# Patient Record
Sex: FEMALE | Race: WHITE | NOT HISPANIC OR LATINO | Employment: FULL TIME | URBAN - METROPOLITAN AREA
[De-identification: names, ages, dates, MRNs, and addresses within clinical notes are randomized per-mention and may not be internally consistent; named-entity substitution may affect disease eponyms.]

---

## 2017-01-12 ENCOUNTER — TELEPHONE (OUTPATIENT)
Dept: FAMILY MEDICINE | Facility: CLINIC | Age: 52
End: 2017-01-12

## 2017-01-12 DIAGNOSIS — I10 HYPERTENSION GOAL BP (BLOOD PRESSURE) < 140/90: Primary | ICD-10-CM

## 2017-01-12 NOTE — TELEPHONE ENCOUNTER
Medication Question Only     Patient calling due to questions regarding  lisinopril (PRINIVIL/ZESTRIL). Patient would prefer to go over details with team at this time. Return number is [phone]    Alis Giang  Reception/ Scheduling

## 2017-01-12 NOTE — TELEPHONE ENCOUNTER
Deborah E Weil is a 51 year old female    S-(situation): Evie is calling today with concerns of medication side effects    B-(background): started on lisinopril. Had a dry cough for quite some time. Was even treated for a sinus infection, thought she had developed a cold. Symptoms have now all gone away, just has a dry cough present.     A-(assessment): dry cough    Pain scale (1-10): 0/10   Denies: Chest pain, sob, sore throat, ear problems, nasal or chest congestion   Meds/MeasuresTried: lisinopril   Improves/Worsens: n/a       R-(recommendations): Home care advice - will discuss with provider to see if medication change is warranted. [facility] in [place] if new medication needed. Patient will be at number listed below til 430 pm.  Will comply with recommendation: YES     If further questions/concerns or if Sxs do not improve, worsen or new Sxs develop, call your PCP or [facility] as soon as possible.    Guideline used:  Telephone Triage Protocols for Nurses, Fourth Edition, Christa Kennedy  Clinical Reference: Lisinopril  Nursing Judgment     Shaniqua Head, RN, BSN

## 2017-01-12 NOTE — TELEPHONE ENCOUNTER
LM- unsure if due to URI or not. Cough is not harmful, could wait 2 weeks to decide. Or, if pt. Is certain it is due to med, switch to cozaar 25 mg PO QD. Shaniqua Orlando

## 2017-01-17 RX ORDER — LOSARTAN POTASSIUM 25 MG/1
12.5 TABLET ORAL DAILY
Qty: 15 TABLET | Refills: 1 | Status: SHIPPED | OUTPATIENT
Start: 2017-01-17 | End: 2017-04-24

## 2017-01-17 NOTE — TELEPHONE ENCOUNTER
Spoke with patient.  she feels its from lisinopril and wants to change medications.  rx sent to pharmacy.  Will come in two weeks after for BP check with float.    Narendra Mantilla RN

## 2017-04-20 NOTE — PROGRESS NOTES
SUBJECTIVE:                                                    Deborah E Weil is a 52 year old female who presents to clinic today for the following health issues:    Hypertension Follow-up      Outpatient blood pressures are not being checked.    Low Salt Diet: low salt       Amount of exercise or physical activity: None    Problems taking medications regularly: No    Medication side effects: cough    Diet: regular (no restrictions)    Acute Illness   Acute illness concerns: Cough  Onset: x 5 months    Fever: no    Chills/Sweats: no    Headache (location?): no    Sinus Pressure:no    Conjunctivitis:  no    Ear Pain: no    Rhinorrhea: no    Congestion: no    Sore Throat: no     Cough: YES    Wheeze: no    Decreased Appetite: no    Nausea: no    Vomiting: no    Diarrhea:  no    Dysuria/Freq.: no    Fatigue/Achiness: no    Sick/Strep Exposure: no     Therapies Tried and outcome: OTC cough meds, allergy pills, vicks, sinus sprays      Patient reports she has been dealing with a persistent severe cough since November/December 2016. She was switched back to lisinopril around November. She has found no relief in symptoms with over the counter medications for cough, allergies and sinus.     She relates that she has not been exercising as much as she should be.     Problem list and histories reviewed & adjusted, as indicated.  Additional history: as documented    Patient Active Problem List   Diagnosis     CARDIOVASCULAR SCREENING; LDL GOAL LESS THAN 160     Stephie-menopausal     Irregular menstrual cycle     Hypertension goal BP (blood pressure) < 140/90     Past Surgical History:   Procedure Laterality Date     CHOLECYSTECTOMY, LAPOROSCOPIC  2004    Cholecystectomy, Laparoscopic     COLONOSCOPY WITH CO2 INSUFFLATION N/A 1/17/2017    Procedure: COLONOSCOPY WITH CO2 INSUFFLATION;  Surgeon: Duane, William Charles, MD;  Location:  OR       Social History   Substance Use Topics     Smoking status: Never Smoker     Smokeless  "tobacco: Never Used     Alcohol use Yes      Comment: Occ at social events, 4 drinks a month     Family History   Problem Relation Age of Onset     DIABETES Father      C.A.D. Father      MI at 40     Hypertension Father      DIABETES Sister      DIABETES Sister      Colon Cancer No family hx of          Current Outpatient Prescriptions   Medication Sig Dispense Refill     losartan (COZAAR) 25 MG tablet Take 1 tablet (25 mg) by mouth daily 30 tablet 0     [DISCONTINUED] losartan (COZAAR) 25 MG tablet Take 0.5 tablets (12.5 mg) by mouth daily (Patient not taking: Reported on 4/24/2017) 15 tablet 1       Reviewed and updated as needed this visit by clinical staff  Tobacco  Allergies  Med Hx  Surg Hx  Fam Hx  Soc Hx      Reviewed and updated as needed this visit by Provider         ROS:  Constitutional, HEENT, cardiovascular, pulmonary, gi and gu systems are negative, except as otherwise noted.    This document serves as a record of the services and decisions personally performed and made by Shaniqua Orlando DNP. It was created on her behalf by Liane Dawson, a trained medical scribe. The creation of this document is based on the provider's statements to the medical scribe.  Liane Dawson 8:35 AM April 24, 2017    OBJECTIVE:                                                    /88 (BP Location: Left arm, Patient Position: Chair, Cuff Size: Adult Regular)  Pulse 85  Temp 98.7  F (37.1  C) (Temporal)  Ht 1.686 m (5' 6.38\")  Wt 88 kg (194 lb)  LMP 09/26/2014  SpO2 96%  BMI 30.96 kg/m2  Body mass index is 30.96 kg/(m^2).  GENERAL APPEARANCE: healthy, alert and no distress  HENT: ear canals and TM's normal and nose and mouth without ulcers or lesions  NECK: no adenopathy, no asymmetry, masses, or scars and thyroid normal to palpation  RESP: lungs clear to auscultation - no rales, rhonchi or wheezes  CV: regular rates and rhythm, normal S1 S2, no S3 or S4 and no murmur, click or rub  NEURO: Normal " strength and tone, mentation intact and speech normal  PSYCH: mentation appears normal and affect normal/bright    Diagnostic test results:  No results found for this or any previous visit (from the past 24 hour(s)).     ASSESSMENT/PLAN:                                                        ICD-10-CM    1. Hypertension goal BP (blood pressure) < 140/90 I10 losartan (COZAAR) 25 MG tablet     ACE cough with HTN- controlled. Pt. Went on lisinopril due to missed visit. Switching back to Cozaar.     Suspect cough is due to lisinopril. Will switch back to cozaar. BMP and BP check in 2 weeks. Updating allergy list. Order placed for cozaar. Medication direction, dosage, and side effects discussed with patient.    Checking labs in 2 weeks on Cozaar and PE December.     Colonoscopy up to date.    Follow up with Provider - December for physical and labs      The information in this document, created by the medical scribe for me, accurately reflects the services I personally performed and the decisions made by me. I have reviewed and approved this document for accuracy prior to leaving the patient care area.  April 24, 2017 8:40 AM    CAMILO Glez [facility] JOSÉ

## 2017-04-24 ENCOUNTER — OFFICE VISIT (OUTPATIENT)
Dept: FAMILY MEDICINE | Facility: CLINIC | Age: 52
End: 2017-04-24
Payer: COMMERCIAL

## 2017-04-24 VITALS
TEMPERATURE: 98.7 F | HEART RATE: 85 BPM | DIASTOLIC BLOOD PRESSURE: 88 MMHG | WEIGHT: 194 LBS | BODY MASS INDEX: 31.18 KG/M2 | OXYGEN SATURATION: 96 % | HEIGHT: 66 IN | SYSTOLIC BLOOD PRESSURE: 124 MMHG

## 2017-04-24 DIAGNOSIS — I10 HYPERTENSION GOAL BP (BLOOD PRESSURE) < 140/90: Primary | ICD-10-CM

## 2017-04-24 PROCEDURE — 99213 OFFICE O/P EST LOW 20 MIN: CPT | Performed by: NURSE PRACTITIONER

## 2017-04-24 RX ORDER — LOSARTAN POTASSIUM 25 MG/1
25 TABLET ORAL DAILY
Qty: 30 TABLET | Refills: 0 | Status: SHIPPED | OUTPATIENT
Start: 2017-04-24 | End: 2017-07-12

## 2017-04-24 ASSESSMENT — PAIN SCALES - GENERAL: PAINLEVEL: NO PAIN (0)

## 2017-04-24 NOTE — MR AVS SNAPSHOT
After Visit Summary   4/24/2017    Deborah E Weil    MRN: [id number]           Patient Information     Date Of Birth          1965        Visit Information        Provider Department      4/24/2017 8:20 AM Shaniqua Orlando APRN CNP [facility]        Today's Diagnoses     Hypertension goal BP (blood pressure) < 140/90    -  1       Follow-ups after your visit        Your next 10 appointments already scheduled     May 08, 2017  8:30 AM CDT   LAB with RG LAB   [facility] ([facility])    [address]  [place] [id number] [phone]           Patient must bring picture ID.  Patient should be prepared to give a urine specimen  Please do not eat 10-12 hours before your appointment if you are coming in fasting for labs on lipids, cholesterol, or glucose (sugar).  Pregnant women should follow their Care Team instructions. Water with medications is okay. Do not drink coffee or other fluids.   If you have concerns about taking  your medications, please ask at office or if scheduling via [employer], send a message by clicking on Secure Messaging, Message Your Care Team.            May 08, 2017  9:00 AM CDT   Nurse Only with RG FLOAT 1   [facility] ([facility])    [address]  [place] [id number] [phone]              Future tests that were ordered for you today     Open Future Orders        Priority Expected Expires Ordered    **Basic metabolic panel FUTURE 2mo Routine 6/23/2017 8/22/2017 4/24/2017            Who to contact     If you have questions or need follow up information about today's clinic visit or your schedule please contact [facility] directly at [phone].  Normal or non-critical lab and imaging results will be communicated to you by MyChart, letter or phone within 4 business days after the clinic has received the results. If you do not hear from us within 7  "days, please contact the clinic through [employer] or phone. If you have a critical or abnormal lab result, we will notify you by phone as soon as possible.  Submit refill requests through [employer] or call your pharmacy and they will forward the refill request to us. Please allow 3 business days for your refill to be completed.          Additional Information About Your Visit        [employer] Information     [employer] gives you secure access to your electronic health record. If you see a primary care provider, you can also send messages to your care team and make appointments. If you have questions, please call your primary care clinic.  If you do not have a primary care provider, please call [phone] and they will assist you.        Care EveryWhere ID     This is your Care EveryWhere ID. This could be used by other organizations to access your [place] medical records  [id number]        Your Vitals Were     Pulse Temperature Height Last Period Pulse Oximetry BMI (Body Mass Index)    85 98.7  F (37.1  C) (Temporal) 5' 6.38\" (1.686 m) 09/26/2014 96% 30.96 kg/m2       Blood Pressure from Last 3 Encounters:   04/24/17 124/88   01/17/17 (!) 117/92   12/22/16 128/86    Weight from Last 3 Encounters:   04/24/17 194 lb (88 kg)   12/22/16 190 lb 6.4 oz (86.4 kg)   12/08/16 191 lb 9.6 oz (86.9 kg)                 Today's Medication Changes          These changes are accurate as of: 4/24/17 12:13 PM.  If you have any questions, ask your nurse or doctor.               These medicines have changed or have updated prescriptions.        Dose/Directions    losartan 25 MG tablet   Commonly known as:  COZAAR   This may have changed:  how much to take   Used for:  Hypertension goal BP (blood pressure) < 140/90   Changed by:  Shaniqua Orlando APRN CNP        Dose:  25 mg   Take 1 tablet (25 mg) by mouth daily   Quantity:  30 tablet   Refills:  0         Stop taking these medicines if you haven't already. Please contact your care team if " you have questions.     LISINOPRIL PO   Stopped by:  Shaniqua Orlando APRN CNP                Where to get your medicines      These medications were sent to [facility] [address]  [address] [address] [address]     Phone:  [phone]     losartan 25 MG tablet                Primary Care Provider Office Phone # Fax #    CAMILO English [phone]       [facility] [address] [address] [facility] [address] [facility] [address]        Thank you!     Thank you for choosing [facility]  for your care. Our goal is always to provide you with excellent care. Hearing back from our patients is one way we can continue to improve our services. Please take a few minutes to complete the written survey that you may receive in the mail after your visit with us. Thank you!             Your Updated Medication List - Protect others around you: Learn how to safely use, store and throw away your medicines at www.disposemymeds.org.          This list is accurate as of: 4/24/17 12:13 PM.  Always use your most recent med list.                   Brand Name Dispense Instructions for use    losartan 25 MG tablet    COZAAR    30 tablet    Take 1 tablet (25 mg) by mouth daily

## 2017-04-24 NOTE — NURSING NOTE
"Chief Complaint   Patient presents with     Cough     Hypertension     Panel Management     UTD       Initial /88 (BP Location: Left arm, Patient Position: Chair, Cuff Size: Adult Regular)  Pulse 85  Temp 98.7  F (37.1  C) (Temporal)  Ht 5' 6.38\" (1.686 m)  Wt 194 lb (88 kg)  LMP 09/26/2014  SpO2 96%  BMI 30.96 kg/m2 Estimated body mass index is 30.96 kg/(m^2) as calculated from the following:    Height as of this encounter: 5' 6.38\" (1.686 m).    Weight as of this encounter: 194 lb (88 kg).  Medication Reconciliation: complete  Rachel Dumont CMA (AAMA)      "

## 2017-05-02 ENCOUNTER — MYC MEDICAL ADVICE (OUTPATIENT)
Dept: FAMILY MEDICINE | Facility: CLINIC | Age: 52
End: 2017-05-02

## 2017-05-08 ENCOUNTER — ALLIED HEALTH/NURSE VISIT (OUTPATIENT)
Dept: FAMILY MEDICINE | Facility: CLINIC | Age: 52
End: 2017-05-08
Payer: COMMERCIAL

## 2017-05-08 VITALS — DIASTOLIC BLOOD PRESSURE: 100 MMHG | HEART RATE: 68 BPM | SYSTOLIC BLOOD PRESSURE: 140 MMHG

## 2017-05-08 DIAGNOSIS — I10 ESSENTIAL HYPERTENSION: Primary | ICD-10-CM

## 2017-05-08 DIAGNOSIS — I10 HYPERTENSION GOAL BP (BLOOD PRESSURE) < 140/90: ICD-10-CM

## 2017-05-08 LAB
ANION GAP SERPL CALCULATED.3IONS-SCNC: 8 MMOL/L (ref 3–14)
BUN SERPL-MCNC: 14 MG/DL (ref 7–30)
CALCIUM SERPL-MCNC: 9.4 MG/DL (ref 8.5–10.1)
CHLORIDE SERPL-SCNC: 106 MMOL/L (ref 94–109)
CO2 SERPL-SCNC: 28 MMOL/L (ref 20–32)
CREAT SERPL-MCNC: 0.9 MG/DL (ref 0.52–1.04)
GFR SERPL CREATININE-BSD FRML MDRD: 66 ML/MIN/1.7M2
GLUCOSE SERPL-MCNC: 106 MG/DL (ref 70–99)
POTASSIUM SERPL-SCNC: 4.6 MMOL/L (ref 3.4–5.3)
SODIUM SERPL-SCNC: 142 MMOL/L (ref 133–144)

## 2017-05-08 PROCEDURE — 99207 ZZC NO CHARGE NURSE ONLY: CPT

## 2017-05-08 PROCEDURE — 80048 BASIC METABOLIC PNL TOTAL CA: CPT | Performed by: NURSE PRACTITIONER

## 2017-05-08 PROCEDURE — 36415 COLL VENOUS BLD VENIPUNCTURE: CPT | Performed by: NURSE PRACTITIONER

## 2017-05-08 RX ORDER — LOSARTAN POTASSIUM AND HYDROCHLOROTHIAZIDE 12.5; 5 MG/1; MG/1
1 TABLET ORAL DAILY
Qty: 30 TABLET | Refills: 1 | Status: SHIPPED | OUTPATIENT
Start: 2017-05-08 | End: 2017-05-23

## 2017-05-08 NOTE — MR AVS SNAPSHOT
After Visit Summary   5/8/2017    Deborah E Weil    MRN: [id number]           Patient Information     Date Of Birth          1965        Visit Information        Provider Department      5/8/2017 9:00 AM RG FLOAT 1 [facility]        Today's Diagnoses     Essential hypertension    -  1       Follow-ups after your visit        Follow-up notes from your care team     Return for BP Recheck.      Future tests that were ordered for you today     Open Future Orders        Priority Expected Expires Ordered    **Basic metabolic panel FUTURE 2mo Routine 7/7/2017 9/5/2017 5/8/2017            Who to contact     If you have questions or need follow up information about today's clinic visit or your schedule please contact [facility] directly at [phone].  Normal or non-critical lab and imaging results will be communicated to you by in[employer]hart, letter or phone within 4 business days after the clinic has received the results. If you do not hear from us within 7 days, please contact the clinic through [employer] or phone. If you have a critical or abnormal lab result, we will notify you by phone as soon as possible.  Submit refill requests through [employer] or call your pharmacy and they will forward the refill request to us. Please allow 3 business days for your refill to be completed.          Additional Information About Your Visit        MyChart Information     [employer] gives you secure access to your electronic health record. If you see a primary care provider, you can also send messages to your care team and make appointments. If you have questions, please call your primary care clinic.  If you do not have a primary care provider, please call [phone] and they will assist you.        Care EveryWhere ID     This is your Care EveryWhere ID. This could be used by other organizations to access your [place] medical records  [id number]        Your Vitals Were     Pulse Last Period                 68 09/26/2014           Blood Pressure from Last 3 Encounters:   05/08/17 (!) 140/100   04/24/17 124/88   01/17/17 (!) 117/92    Weight from Last 3 Encounters:   04/24/17 194 lb (88 kg)   12/22/16 190 lb 6.4 oz (86.4 kg)   12/08/16 191 lb 9.6 oz (86.9 kg)                 Today's Medication Changes          These changes are accurate as of: 5/8/17  9:36 AM.  If you have any questions, ask your nurse or doctor.               Start taking these medicines.        Dose/Directions    losartan-hydrochlorothiazide 50-12.5 MG per tablet   Commonly known as:  HYZAAR   Used for:  Essential hypertension        Dose:  1 tablet   Take 1 tablet by mouth daily   Quantity:  30 tablet   Refills:  1            Where to get your medicines      These medications were sent to [facility] [address] [place] [address] [facility]  [address] [address] [facility] [facility] [address] [address]     Phone:  [phone]     losartan-hydrochlorothiazide 50-12.5 MG per tablet                Primary Care Provider Office Phone # Fax #    CAMILO English [facility] [phone]       [facility] [address] [facility] [address]        Thank you!     Thank you for choosing [facility]  for your care. Our goal is always to provide you with excellent care. Hearing back from our patients is one way we can continue to improve our services. Please take a few minutes to complete the written survey that you may receive in the mail after your visit with us. Thank you!             Your Updated Medication List - Protect others around you: Learn how to safely use, store and throw away your medicines at www.disposemymeds.org.          This list is accurate as of: 5/8/17  9:36 AM.  Always use your most recent med list.                   Brand Name Dispense Instructions for use    losartan 25 MG tablet    COZAAR    30 tablet    Take 1 tablet (25 mg) by mouth daily       losartan-hydrochlorothiazide 50-12.5 MG per tablet    HYZAAR    30  tablet    Take 1 tablet by mouth daily

## 2017-05-08 NOTE — PROGRESS NOTES
Deborah E Weil is a 52 year old female who comes in today for a Blood Pressure check because of new medication.    *Document pulse and BP  *Use new set of vitals button for multiple readings.  *Use extended vitals for orthostatic    Vitals as recorded, a  Regular large cuff was used.    Patient is taking medication as prescribed  Patient is tolerating medications well.  Patient is not monitoring Blood Pressure at home.      Current complaints: none    Disposition: results routed to MD/JORGE Mo CMA

## 2017-05-23 ENCOUNTER — ALLIED HEALTH/NURSE VISIT (OUTPATIENT)
Dept: FAMILY MEDICINE | Facility: CLINIC | Age: 52
End: 2017-05-23
Payer: COMMERCIAL

## 2017-05-23 VITALS — HEART RATE: 80 BPM | DIASTOLIC BLOOD PRESSURE: 88 MMHG | SYSTOLIC BLOOD PRESSURE: 110 MMHG

## 2017-05-23 DIAGNOSIS — I10 ESSENTIAL HYPERTENSION: Primary | ICD-10-CM

## 2017-05-23 PROCEDURE — 99207 ZZC NO CHARGE NURSE ONLY: CPT

## 2017-05-23 RX ORDER — LOSARTAN POTASSIUM AND HYDROCHLOROTHIAZIDE 12.5; 5 MG/1; MG/1
1 TABLET ORAL DAILY
Qty: 90 TABLET | Refills: 3 | Status: SHIPPED | OUTPATIENT
Start: 2017-05-23 | End: 2017-07-12

## 2017-05-23 NOTE — MR AVS SNAPSHOT
After Visit Summary   5/23/2017    Deborah E Weil    MRN: [id number]           Patient Information     Date Of Birth          1965        Visit Information        Provider Department      5/23/2017 8:30 AM RG FLOAT 1 [facility]        Today's Diagnoses     HTN (hypertension)    -  1       Follow-ups after your visit        Follow-up notes from your care team     Return for BP Recheck.      Who to contact     If you have questions or need follow up information about today's clinic visit or your schedule please contact [facility] directly at [phone].  Normal or non-critical lab and imaging results will be communicated to you by [employer], letter or phone within 4 business days after the clinic has received the results. If you do not hear from us within 7 days, please contact the clinic through [employer] or phone. If you have a critical or abnormal lab result, we will notify you by phone as soon as possible.  Submit refill requests through [employer] or call your pharmacy and they will forward the refill request to us. Please allow 3 business days for your refill to be completed.          Additional Information About Your Visit        MyChart Information     [employer] gives you secure access to your electronic health record. If you see a primary care provider, you can also send messages to your care team and make appointments. If you have questions, please call your primary care clinic.  If you do not have a primary care provider, please call [phone] and they will assist you.        Care EveryWhere ID     This is your Care EveryWhere ID. This could be used by other organizations to access your [place] medical records  [id number]        Your Vitals Were     Pulse Last Period                80 09/26/2014           Blood Pressure from Last 3 Encounters:   05/23/17 110/88   05/08/17 (!) 140/100   04/24/17 124/88    Weight from Last 3 Encounters:   04/24/17 194 lb (88 kg)    12/22/16 190 lb 6.4 oz (86.4 kg)   12/08/16 191 lb 9.6 oz (86.9 kg)              Today, you had the following     No orders found for display       Primary Care Provider Office Phone # Fax #    Shaniqua COPELAND CAMILO Orlando [facility] [phone]       [facility] [address] [facility] [address]        Thank you!     Thank you for choosing [facility]  for your care. Our goal is always to provide you with excellent care. Hearing back from our patients is one way we can continue to improve our services. Please take a few minutes to complete the written survey that you may receive in the mail after your visit with us. Thank you!             Your Updated Medication List - Protect others around you: Learn how to safely use, store and throw away your medicines at www.disposemymeds.org.          This list is accurate as of: 5/23/17  8:31 AM.  Always use your most recent med list.                   Brand Name Dispense Instructions for use    losartan 25 MG tablet    COZAAR    30 tablet    Take 1 tablet (25 mg) by mouth daily       losartan-hydrochlorothiazide 50-12.5 MG per tablet    HYZAAR    30 tablet    Take 1 tablet by mouth daily

## 2017-05-23 NOTE — PROGRESS NOTES
Deborah E Weil is a 52 year old female who comes in today for a Blood Pressure check because of medication change.    *Document pulse and BP  *Use new set of vitals button for multiple readings.  *Use extended vitals for orthostatic    Vitals as recorded, a large cuff was used.    Patient is taking medication as prescribed  Patient is tolerating medications well.  Patient is monitoring Blood Pressure at home.  Average readings if yes are 120/94    Current complaints: none    Disposition: results routed to MD/JORGE Mo CMA

## 2017-06-05 DIAGNOSIS — I10 ESSENTIAL HYPERTENSION: ICD-10-CM

## 2017-06-05 LAB
ANION GAP SERPL CALCULATED.3IONS-SCNC: 10 MMOL/L (ref 3–14)
BUN SERPL-MCNC: 18 MG/DL (ref 7–30)
CALCIUM SERPL-MCNC: 9.4 MG/DL (ref 8.5–10.1)
CHLORIDE SERPL-SCNC: 103 MMOL/L (ref 94–109)
CO2 SERPL-SCNC: 27 MMOL/L (ref 20–32)
CREAT SERPL-MCNC: 0.92 MG/DL (ref 0.52–1.04)
GFR SERPL CREATININE-BSD FRML MDRD: 65 ML/MIN/1.7M2
GLUCOSE SERPL-MCNC: 103 MG/DL (ref 70–99)
POTASSIUM SERPL-SCNC: 4.1 MMOL/L (ref 3.4–5.3)
SODIUM SERPL-SCNC: 140 MMOL/L (ref 133–144)

## 2017-06-05 PROCEDURE — 36415 COLL VENOUS BLD VENIPUNCTURE: CPT | Performed by: NURSE PRACTITIONER

## 2017-06-05 PROCEDURE — 80048 BASIC METABOLIC PNL TOTAL CA: CPT | Performed by: NURSE PRACTITIONER

## 2017-06-06 ENCOUNTER — MYC MEDICAL ADVICE (OUTPATIENT)
Dept: FAMILY MEDICINE | Facility: CLINIC | Age: 52
End: 2017-06-06

## 2017-07-11 DIAGNOSIS — I10 ESSENTIAL HYPERTENSION: ICD-10-CM

## 2017-07-12 ENCOUNTER — MYC MEDICAL ADVICE (OUTPATIENT)
Dept: FAMILY MEDICINE | Facility: CLINIC | Age: 52
End: 2017-07-12

## 2017-07-12 DIAGNOSIS — I10 ESSENTIAL HYPERTENSION: ICD-10-CM

## 2017-07-12 RX ORDER — LOSARTAN POTASSIUM AND HYDROCHLOROTHIAZIDE 12.5; 5 MG/1; MG/1
1 TABLET ORAL DAILY
Qty: 90 TABLET | Refills: 1 | Status: SHIPPED | OUTPATIENT
Start: 2017-07-12 | End: 2018-01-23

## 2017-07-12 NOTE — TELEPHONE ENCOUNTER
Patient can be informed that a prescription for Losartan/HCTZ was sent as requested to [facility]. Ninety days with a refill similar to the prescription sent in June by Shaniqua Orlando. Not sure if she is still taking Losartan 25 mg in addition. Needing to be clarified.    Cecil Canchola MD

## 2017-07-14 RX ORDER — LOSARTAN POTASSIUM AND HYDROCHLOROTHIAZIDE 12.5; 5 MG/1; MG/1
TABLET ORAL
Qty: 30 TABLET | Refills: 0 | OUTPATIENT
Start: 2017-07-14

## 2017-07-14 NOTE — TELEPHONE ENCOUNTER
losartan-hydrochlorothiazide (HYZAAR) 50-12.5 MG per tablet  Rx was sent 07/12/2017 for 90 tabs and 1 refills. Patient should have medication through 01/2018.  Pharmacy notified via E-prescribe refusal.  Cara Alonzo, RN, BSN

## 2018-01-18 NOTE — PROGRESS NOTES
"   SUBJECTIVE:   CC: Deborah E Weil is an 52 year old woman who presents for preventive health visit.     Physical   Annual:     Getting at least 3 servings of Calcium per day::  Yes    Bi-annual eye exam::  NO    Dental care twice a year::  NO    Sleep apnea or symptoms of sleep apnea::  None    Diet::  Regular (no restrictions)    Frequency of exercise::  2-3 days/week    Duration of exercise::  15-30 minutes    Taking medications regularly::  Yes    Medication side effects::  Muscle aches    Additional concerns today::  No          Hypertension Follow-up      Outpatient blood pressures are not being checked. Only once in while     Low Salt Diet: no added salt    Patient is on Losartan-HCTZ 50-12.5mg and states her blood pressure has been well-controlled at home. She states she develops left foot aches and wonders if it relates to medication side effects. She denies side effects with the medication such as headaches, nausea, swelling, rashes.    She notes that she has gained some weight over the holidays and is aware of this. She admits that her and spouse have \"gotten lazy\". She understands the importance of weight management. She has a treadmill at home and runs for about 30 minutes.     Her LMP was about 4 years ago. She does note of some intermittent menopausal symptoms but they are manageable. She denies vaginal symptoms or spotting.    She states that she does not sleep well at night. She relates this to menopause or likely anxiety. She states that she will suddenly wake up at night a little startled. Patient's  has mentioned to patient that patient has begun snoring at night as well.       Today's PHQ-2 Score:   PHQ-2 ( 1999 Pfizer) 1/23/2018   Q1: Little interest or pleasure in doing things 0   Q2: Feeling down, depressed or hopeless 0   PHQ-2 Score 0   Q1: Little interest or pleasure in doing things Not at all   Q2: Feeling down, depressed or hopeless Not at all   PHQ-2 Score 0       Abuse: Current " or Past(Physical, Sexual or Emotional)- No  Do you feel safe in your environment - Yes    Social History   Substance Use Topics     Smoking status: Never Smoker     Smokeless tobacco: Never Used     Alcohol use Yes      Comment: Occ at social events, 4 drinks a month     Alcohol Use 1/23/2018   If you drink alcohol, do you typically have greater than 3 drinks per day OR greater than 7 drinks per week?   No       Reviewed orders with patient.  Reviewed health maintenance and updated orders accordingly - Yes  Labs reviewed in EPIC  BP Readings from Last 3 Encounters:   01/23/18 116/80   05/23/17 110/88   05/08/17 (!) 140/100    Wt Readings from Last 3 Encounters:   01/23/18 199 lb (90.3 kg)   04/24/17 194 lb (88 kg)   12/22/16 190 lb 6.4 oz (86.4 kg)                  Patient Active Problem List   Diagnosis     CARDIOVASCULAR SCREENING; LDL GOAL LESS THAN 160     Hypertension goal BP (blood pressure) < 140/90     Menopause     Past Surgical History:   Procedure Laterality Date     CHOLECYSTECTOMY, LAPOROSCOPIC  2004    Cholecystectomy, Laparoscopic     COLONOSCOPY WITH CO2 INSUFFLATION N/A 1/17/2017    Procedure: COLONOSCOPY WITH CO2 INSUFFLATION;  Surgeon: Duane, William Charles, MD;  Location:  OR       Social History   Substance Use Topics     Smoking status: Never Smoker     Smokeless tobacco: Never Used     Alcohol use Yes      Comment: Occ at social events, 4 drinks a month     Family History   Problem Relation Age of Onset     DIABETES Father      C.A.D. Father      MI at 40     Hypertension Father      DIABETES Sister      DIABETES Sister      Colon Cancer No family hx of          Current Outpatient Prescriptions   Medication Sig Dispense Refill     losartan-hydrochlorothiazide (HYZAAR) 50-12.5 MG per tablet Take 1 tablet by mouth daily 90 tablet 3     [DISCONTINUED] losartan-hydrochlorothiazide (HYZAAR) 50-12.5 MG per tablet Take 1 tablet by mouth daily 90 tablet 1     Allergies   Allergen Reactions      Lisinopril Cough     Recent Labs   Lab Test  01/23/18   0829  06/05/17   0943   12/08/16   1135  03/18/15   0945  10/21/14   0852   LDL  96   --    --   134*   --   106   HDL  62   --    --   64   --   66   TRIG  76   --    --   62   --   87   ALT   --    --    --   28  24   --    CR  0.91  0.92   < >  0.80  0.80   --    GFRESTIMATED  65  65   < >  75  76   --    GFRESTBLACK  78  78   < >  >90   GFR Calc    >90   GFR Calc     --    POTASSIUM  3.4  4.1   < >  4.0  4.5   --    TSH  1.58   --    --    --    --   2.46    < > = values in this interval not displayed.              Patient over age 50, mutual decision to screen reflected in health maintenance.      Pertinent mammograms are reviewed under the imaging tab.  History of abnormal Pap smear:   NO - age 30-65 PAP every 5 years with negative HPV co-testing recommended  Last 3 Pap Results:   PAP (no units)   Date Value   10/21/2014 NIL       Reviewed and updated as needed this visit by clinical staff  Tobacco  Allergies  Med Hx  Surg Hx  Fam Hx  Soc Hx        Reviewed and updated as needed this visit by Provider          Review of Systems  C: NEGATIVE for fever, chills, change in weight  I: NEGATIVE for worrisome rashes, moles or lesions  E: NEGATIVE for vision changes or irritation  ENT: NEGATIVE for ear, mouth and throat problems  R: NEGATIVE for significant cough or SOB  B: NEGATIVE for masses, tenderness or discharge  CV: NEGATIVE for chest pain, palpitations or peripheral edema  GI: NEGATIVE for nausea, abdominal pain, heartburn, or change in bowel habits  : NEGATIVE for unusual urinary or vaginal symptoms. No vaginal bleeding.  M: NEGATIVE for significant arthralgias or myalgia. POSITIVE for left foot aches  N: NEGATIVE for weakness, dizziness or paresthesias  P: NEGATIVE for changes in mood or affect     This document serves as a record of the services and decisions personally performed and made by Shaniqua Orlando CNP. It  "was created on her behalf by Slime Gonzales, a trained medical scribe. The creation of this document is based the provider's statements to the medical scribe.    Slime Gonzales January 23, 2018 8:13 AM    OBJECTIVE:   /80  Pulse 76  Temp 99  F (37.2  C) (Temporal)  Resp 14  Ht 5' 6\" (1.676 m)  Wt 199 lb (90.3 kg)  LMP 09/26/2014  BMI 32.12 kg/m2  Physical Exam  GENERAL APPEARANCE: healthy, alert and no distress  EYES: Eyes grossly normal to inspection, PERRL and conjunctivae and sclerae normal  HENT: ear canals and TM's normal, nose and mouth without ulcers or lesions, oropharynx clear and oral mucous membranes moist  NECK: no adenopathy, no asymmetry, masses, or scars and thyroid normal to palpation  RESP: lungs clear to auscultation - no rales, rhonchi or wheezes  BREAST: normal without masses, tenderness or nipple discharge and no palpable axillary masses or adenopathy  CV: regular rate and rhythm, normal S1 S2, no S3 or S4, no murmur, click or rub, no peripheral edema and peripheral pulses strong  ABDOMEN: soft, nontender, no hepatosplenomegaly, no masses and bowel sounds normal  MS: no musculoskeletal defects are noted and gait is age appropriate without ataxia  SKIN: no suspicious lesions or rashes  NEURO: Normal strength and tone, sensory exam grossly normal, mentation intact and speech normal  PSYCH: mentation appears normal and affect normal/bright    ASSESSMENT/PLAN:       ICD-10-CM    1. Encounter for routine adult medical exam with abnormal findings Z00.01 ADMIN 1st VACCINE     TSH with free T4 reflex     Lipid panel reflex to direct LDL Fasting     Hemoglobin     CANCELED: TDAP VACCINE (ADACEL)   2. Routine general medical examination at a health care facility Z00.00    3. Essential hypertension I10 losartan-hydrochlorothiazide (HYZAAR) 50-12.5 MG per tablet     Basic metabolic panel   4. Encounter for screening mammogram for breast cancer Z12.31 *MA Screening Digital Bilateral   5. Need for " "vaccination Z23 TD (ADULT, 7+) PRESERVE FREE     Hypertension: Chronic, stable, well controlled, continue current medication, refill done if needed.    Continue to monitor snoring at home. Discussed warning signs regarding FELICITAS symptoms, however, I suspect snoring is secondary to elevated weight. Discussed weight management. Encouraged developing healthy habits and good nutrition. If no improvement to her snorning, patient will let me know and we will discuss potential sleepy study referral at that time.    For patient's foot pain, I recommended supportive footwear. If foot pain persists, will plan further for workup with Podiatry. Patient will continue to monitor this at home and call me for worsening symptoms.    Medications reviewed with Patient today. Medication list updated and refills done today if needed.     Orders placed for fasting labs for patient to complete today. Will notify patient with results.    Due for mammogram, referral provided, see orders for details.  Colonoscopy UTD, completed in 2017. Next due in 2027.   Pap UTD, completed in 2014 and next due 1yr.     Tdap completed today.    Follow-up: in 1yr for PE and pap or sooner as needed.      COUNSELING:  Special attention given to:        Regular exercise       Healthy diet/nutrition       Immunizations    Vaccinated for: TDAP           Mammogram screening    Calcium and bone density.        reports that she has never smoked. She has never used smokeless tobacco.    Estimated body mass index is 32.12 kg/(m^2) as calculated from the following:    Height as of this encounter: 5' 6\" (1.676 m).    Weight as of this encounter: 199 lb (90.3 kg).   Weight management plan: Discussed healthy diet and exercise guidelines and patient will follow up in 12 months in clinic to re-evaluate.    Counseling Resources:  ATP IV Guidelines  Pooled Cohorts Equation Calculator  Breast Cancer Risk Calculator  FRAX Risk Assessment  ICSI Preventive Guidelines  Dietary " Guidelines for Americans, 2010  USDA's MyPlate  ASA Prophylaxis  Lung CA Screening    The information in this document, created by the medical scribe for me, accurately reflects the services I personally performed and the decisions made by me. I have reviewed and approved this document for accuracy prior to leaving the patient care area.    CAMILO Glez [facility] JOSÉ

## 2018-01-18 NOTE — PROGRESS NOTES
"  SUBJECTIVE:                                                    Deborah E Weil is a 52 year old female who presents to clinic today for the following health issues:  {Provider please address medication reconciliation discrepancies--rooming staff please delete if no med/rec issues}    HPI    {additional problems for roomer to add, delete if none:677345}    Problem list and histories reviewed & adjusted, as indicated.  Additional history: {NONE - AS DOCUMENTED:855673::\"as documented\"}    {ACUTE Problem SUPERLIST - brief histories:640840}    {HIST REVIEW/ LINKS 2:812755}    {PROVIDER CHARTING PREFERENCE:395706}  "

## 2018-01-23 ENCOUNTER — OFFICE VISIT (OUTPATIENT)
Dept: FAMILY MEDICINE | Facility: CLINIC | Age: 53
End: 2018-01-23
Payer: COMMERCIAL

## 2018-01-23 VITALS
RESPIRATION RATE: 14 BRPM | HEIGHT: 66 IN | DIASTOLIC BLOOD PRESSURE: 80 MMHG | BODY MASS INDEX: 31.98 KG/M2 | HEART RATE: 76 BPM | WEIGHT: 199 LBS | TEMPERATURE: 99 F | SYSTOLIC BLOOD PRESSURE: 116 MMHG

## 2018-01-23 DIAGNOSIS — Z12.31 ENCOUNTER FOR SCREENING MAMMOGRAM FOR BREAST CANCER: ICD-10-CM

## 2018-01-23 DIAGNOSIS — Z00.01 ENCOUNTER FOR ROUTINE ADULT MEDICAL EXAM WITH ABNORMAL FINDINGS: Primary | ICD-10-CM

## 2018-01-23 DIAGNOSIS — I10 ESSENTIAL HYPERTENSION: ICD-10-CM

## 2018-01-23 DIAGNOSIS — Z23 NEED FOR VACCINATION: ICD-10-CM

## 2018-01-23 PROBLEM — Z78.0 MENOPAUSE: Status: ACTIVE | Noted: 2018-01-23

## 2018-01-23 LAB
ANION GAP SERPL CALCULATED.3IONS-SCNC: 9 MMOL/L (ref 3–14)
BUN SERPL-MCNC: 18 MG/DL (ref 7–30)
CALCIUM SERPL-MCNC: 9.2 MG/DL (ref 8.5–10.1)
CHLORIDE SERPL-SCNC: 103 MMOL/L (ref 94–109)
CHOLEST SERPL-MCNC: 173 MG/DL
CO2 SERPL-SCNC: 27 MMOL/L (ref 20–32)
CREAT SERPL-MCNC: 0.91 MG/DL (ref 0.52–1.04)
GFR SERPL CREATININE-BSD FRML MDRD: 65 ML/MIN/1.7M2
GLUCOSE SERPL-MCNC: 106 MG/DL (ref 70–99)
HDLC SERPL-MCNC: 62 MG/DL
HGB BLD-MCNC: 13.2 G/DL (ref 11.7–15.7)
LDLC SERPL CALC-MCNC: 96 MG/DL
NONHDLC SERPL-MCNC: 111 MG/DL
POTASSIUM SERPL-SCNC: 3.4 MMOL/L (ref 3.4–5.3)
SODIUM SERPL-SCNC: 139 MMOL/L (ref 133–144)
TRIGL SERPL-MCNC: 76 MG/DL
TSH SERPL DL<=0.005 MIU/L-ACNC: 1.58 MU/L (ref 0.4–4)

## 2018-01-23 PROCEDURE — 80061 LIPID PANEL: CPT | Performed by: NURSE PRACTITIONER

## 2018-01-23 PROCEDURE — 90471 IMMUNIZATION ADMIN: CPT | Performed by: NURSE PRACTITIONER

## 2018-01-23 PROCEDURE — 80048 BASIC METABOLIC PNL TOTAL CA: CPT | Performed by: NURSE PRACTITIONER

## 2018-01-23 PROCEDURE — 84443 ASSAY THYROID STIM HORMONE: CPT | Performed by: NURSE PRACTITIONER

## 2018-01-23 PROCEDURE — 85018 HEMOGLOBIN: CPT | Performed by: NURSE PRACTITIONER

## 2018-01-23 PROCEDURE — 36415 COLL VENOUS BLD VENIPUNCTURE: CPT | Performed by: NURSE PRACTITIONER

## 2018-01-23 PROCEDURE — 99396 PREV VISIT EST AGE 40-64: CPT | Mod: 25 | Performed by: NURSE PRACTITIONER

## 2018-01-23 PROCEDURE — 90714 TD VACC NO PRESV 7 YRS+ IM: CPT | Performed by: NURSE PRACTITIONER

## 2018-01-23 RX ORDER — LOSARTAN POTASSIUM AND HYDROCHLOROTHIAZIDE 12.5; 5 MG/1; MG/1
1 TABLET ORAL DAILY
Qty: 90 TABLET | Refills: 3 | Status: SHIPPED | OUTPATIENT
Start: 2018-01-23 | End: 2019-01-25 | Stop reason: DRUGHIGH

## 2018-01-23 ASSESSMENT — PAIN SCALES - GENERAL: PAINLEVEL: NO PAIN (0)

## 2018-01-23 NOTE — NURSING NOTE
Prior to injection verified patient identity using patient's name and date of birth.      Screening Questionnaire for Adult Immunization    Are you sick today?   No   Do you have allergies to medications, food, a vaccine component or latex?   No   Have you ever had a serious reaction after receiving a vaccination?   No   Do you have a long-term health problem with heart disease, lung disease, asthma, kidney disease, metabolic disease (e.g. diabetes), anemia, or other blood disorder?   No   Do you have cancer, leukemia, HIV/AIDS, or any other immune system problem?   No   In the past 3 months, have you taken medications that affect  your immune system, such as prednisone, other steroids, or anticancer drugs; drugs for the treatment of rheumatoid arthritis, Crohn s disease, or psoriasis; or have you had radiation treatments?   No   Have you had a seizure, or a brain or other nervous system problem?   No   During the past year, have you received a transfusion of blood or blood     products, or been given immune (gamma) globulin or antiviral drug?   No   For women: Are you pregnant or is there a chance you could become        pregnant during the next month?   No   Have you received any vaccinations in the past 4 weeks?   No     Immunization questionnaire answers were all negative.        Per orders of Shaniqua Salmon, injection of TD given by Boni Doshi. Patient instructed to remain in clinic for 15 minutes afterwards, and to report any adverse reaction to me immediately.       Screening performed by Boni Doshi on 1/23/2018 at 9:14 AM.

## 2018-01-23 NOTE — MR AVS SNAPSHOT
After Visit Summary   1/23/2018    Deborah E Weil    MRN: [id number]           Patient Information     Date Of Birth          1965        Visit Information        Provider Department      1/23/2018 8:00 AM Shaniqua Orlando APRN [facility]        Today's Diagnoses     Encounter for routine adult medical exam with abnormal findings    -  1    Essential hypertension        Encounter for screening mammogram for breast cancer        Need for vaccination          Care Instructions      Preventive Health Recommendations  Female Ages 50 - 64    Yearly exam: See your health care provider every year in order to  o Review health changes.   o Discuss preventive care.    o Review your medicines if your doctor has prescribed any.      Get a Pap test every three years (unless you have an abnormal result and your provider advises testing more often).    If you get Pap tests with HPV test, you only need to test every 5 years, unless you have an abnormal result.     You do not need a Pap test if your uterus was removed (hysterectomy) and you have not had cancer.    You should be tested each year for STDs (sexually transmitted diseases) if you're at risk.     Have a mammogram every 1 to 2 years.    Have a colonoscopy at age 50, or have a yearly FIT test (stool test). These exams screen for colon cancer.      Have a cholesterol test every 5 years, or more often if advised.    Have a diabetes test (fasting glucose) every three years. If you are at risk for diabetes, you should have this test more often.     If you are at risk for osteoporosis (brittle bone disease), think about having a bone density scan (DEXA).    Shots: Get a flu shot each year. Get a tetanus shot every 10 years.    Nutrition:     Eat at least 5 servings of fruits and vegetables each day.    Eat whole-grain bread, whole-wheat pasta and brown rice instead of white grains and rice.    Talk to your provider about Calcium and Vitamin D.  "    Lifestyle    Exercise at least 150 minutes a week (30 minutes a day, 5 days a week). This will help you control your weight and prevent disease.    Limit alcohol to one drink per day.    No smoking.     Wear sunscreen to prevent skin cancer.     See your dentist every six months for an exam and cleaning.    See your eye doctor every 1 to 2 years.            Follow-ups after your visit        Who to contact     If you have questions or need follow up information about today's clinic visit or your schedule please contact [facility] directly at [phone].  Normal or non-critical lab and imaging results will be communicated to you by ADCentricityhart, letter or phone within 4 business days after the clinic has received the results. If you do not hear from us within 7 days, please contact the clinic through Liberatat or phone. If you have a critical or abnormal lab result, we will notify you by phone as soon as possible.  Submit refill requests through Let's Jock or call your pharmacy and they will forward the refill request to us. Please allow 3 business days for your refill to be completed.          Additional Information About Your Visit        ADCentricityharSmartCare system Information     Let's Jock gives you secure access to your electronic health record. If you see a primary care provider, you can also send messages to your care team and make appointments. If you have questions, please call your primary care clinic.  If you do not have a primary care provider, please call [phone] and they will assist you.        Care EveryWhere ID     This is your Care EveryWhere ID. This could be used by other organizations to access your [place] medical records  [id number]        Your Vitals Were     Pulse Temperature Respirations Height Last Period BMI (Body Mass Index)    76 99  F (37.2  C) (Temporal) 14 5' 6\" (1.676 m) 09/26/2014 32.12 kg/m2       Blood Pressure from Last 3 Encounters:   01/23/18 116/80   05/23/17 110/88   05/08/17 (!) " 140/100    Weight from Last 3 Encounters:   01/23/18 199 lb (90.3 kg)   04/24/17 194 lb (88 kg)   12/22/16 190 lb 6.4 oz (86.4 kg)              We Performed the Following     ADMIN 1st VACCINE     Basic metabolic panel     Hemoglobin     Lipid panel reflex to direct LDL Fasting     TD (ADULT, 7+) PRESERVE FREE     TSH with free T4 reflex          Where to get your medicines      These medications were sent to [facility] [address] [facility]  [address] [facility] [address]     Phone:  [phone]     losartan-hydrochlorothiazide 50-12.5 MG per tablet          Primary Care Provider Office Phone # Fax #    ShaniquaCAMILO Melo [facility] [phone] [address] [facility] [address]        Equal Access to Services     [facility]: Hadii aad edith hadasho Soomaali, waaxda luqadaha, qaybta kaalmada adeegyada, jennifer mahmood . So [facility] [phone].    ATENCIÓN: Si habla español, tiene a grullon disposición servicios gratuitos de asistencia lingüística. Margarito al [phone].    We comply with applicable federal civil rights laws and [place] laws. We do not discriminate on the basis of race, color, national origin, age, disability, sex, sexual orientation, or gender identity.            Thank you!     Thank you for choosing [facility]  for your care. Our goal is always to provide you with excellent care. Hearing back from our patients is one way we can continue to improve our services. Please take a few minutes to complete the written survey that you may receive in the mail after your visit with us. Thank you!             Your Updated Medication List - Protect others around you: Learn how to safely use, store and throw away your medicines at www.disposemymeds.org.          This list is accurate as of 1/23/18 11:59 PM.  Always use your most recent med list.                   Brand Name Dispense Instructions for use Diagnosis     losartan-hydrochlorothiazide 50-12.5 MG per tablet    HYZAAR    90 tablet    Take 1 tablet by mouth daily    Essential hypertension

## 2018-02-05 ENCOUNTER — RADIANT APPOINTMENT (OUTPATIENT)
Dept: MAMMOGRAPHY | Facility: CLINIC | Age: 53
End: 2018-02-05
Attending: NURSE PRACTITIONER
Payer: COMMERCIAL

## 2018-02-05 DIAGNOSIS — Z12.31 ENCOUNTER FOR SCREENING MAMMOGRAM FOR BREAST CANCER: ICD-10-CM

## 2018-02-05 PROCEDURE — 77067 SCR MAMMO BI INCL CAD: CPT | Performed by: STUDENT IN AN ORGANIZED HEALTH CARE EDUCATION/TRAINING PROGRAM

## 2018-06-29 ENCOUNTER — OFFICE VISIT (OUTPATIENT)
Dept: FAMILY MEDICINE | Facility: CLINIC | Age: 53
End: 2018-06-29
Payer: COMMERCIAL

## 2018-06-29 VITALS
HEIGHT: 66 IN | DIASTOLIC BLOOD PRESSURE: 82 MMHG | OXYGEN SATURATION: 97 % | HEART RATE: 80 BPM | SYSTOLIC BLOOD PRESSURE: 118 MMHG | BODY MASS INDEX: 33.04 KG/M2 | RESPIRATION RATE: 16 BRPM | TEMPERATURE: 98.9 F | WEIGHT: 205.6 LBS

## 2018-06-29 DIAGNOSIS — M25.512 LEFT SHOULDER PAIN, UNSPECIFIED CHRONICITY: Primary | ICD-10-CM

## 2018-06-29 PROCEDURE — 99214 OFFICE O/P EST MOD 30 MIN: CPT | Performed by: NURSE PRACTITIONER

## 2018-06-29 ASSESSMENT — PAIN SCALES - GENERAL: PAINLEVEL: WORST PAIN (10)

## 2018-06-29 NOTE — MR AVS SNAPSHOT
After Visit Summary   6/29/2018    Deborah E Weil    MRN: [id number]           Patient Information     Date Of Birth          1965        Visit Information        Provider Department      6/29/2018 4:00 PM Shaniqua rOlando APRN CNP [facility]        Today's Diagnoses     Left shoulder pain, unspecified chronicity    -  1       Follow-ups after your visit        Future tests that were ordered for you today     Open Future Orders        Priority Expected Expires Ordered    MR Shoulder Left w/o Contrast Routine  6/29/2019 6/29/2018            Who to contact     If you have questions or need follow up information about today's clinic visit or your schedule please contact [facility] directly at [phone].  Normal or non-critical lab and imaging results will be communicated to you by PingStamphart, letter or phone within 4 business days after the clinic has received the results. If you do not hear from us within 7 days, please contact the clinic through PingStamphart or phone. If you have a critical or abnormal lab result, we will notify you by phone as soon as possible.  Submit refill requests through Sellvana or call your pharmacy and they will forward the refill request to us. Please allow 3 business days for your refill to be completed.          Additional Information About Your Visit        MyChart Information     Sellvana gives you secure access to your electronic health record. If you see a primary care provider, you can also send messages to your care team and make appointments. If you have questions, please call your primary care clinic.  If you do not have a primary care provider, please call [phone] and they will assist you.        Care EveryWhere ID     This is your Care EveryWhere ID. This could be used by other organizations to access your [place] medical records  [id number]        Your Vitals Were     Pulse Temperature Respirations Height Last Period Pulse Oximetry     "80 98.9  F (37.2  C) (Temporal) 16 5' 6\" (1.676 m) 09/26/2014 97%    BMI (Body Mass Index)                   33.18 kg/m2            Blood Pressure from Last 3 Encounters:   06/29/18 118/82   01/23/18 116/80   05/23/17 110/88    Weight from Last 3 Encounters:   06/29/18 205 lb 9.6 oz (93.3 kg)   01/23/18 199 lb (90.3 kg)   04/24/17 194 lb (88 kg)               Primary Care Provider Office Phone # Fax #    Shaniqua CAMILO Tadeo [facility] [phone] [address] [facility] [address]        Equal Access to Services     [facility] : Hadii zack sharma hadasho Soomaali, waaxda luqadaha, qaybta kaalmada adeegyada, waxay dalein hayman mahmood . So [facility] [phone].    ATENCIÓN: Si habla español, tiene a grullon disposición servicios gratuitos de asistencia lingüística. [facility] [phone].    We comply with applicable federal civil rights laws and [place] laws. We do not discriminate on the basis of race, color, national origin, age, disability, sex, sexual orientation, or gender identity.            Thank you!     Thank you for choosing [facility]  for your care. Our goal is always to provide you with excellent care. Hearing back from our patients is one way we can continue to improve our services. Please take a few minutes to complete the written survey that you may receive in the mail after your visit with us. Thank you!             Your Updated Medication List - Protect others around you: Learn how to safely use, store and throw away your medicines at www.disposemymeds.org.          This list is accurate as of 6/29/18  5:09 PM.  Always use your most recent med list.                   Brand Name Dispense Instructions for use Diagnosis    ADVIL PO           losartan-hydrochlorothiazide 50-12.5 MG per tablet    HYZAAR    90 tablet    Take 1 tablet by mouth daily    Essential hypertension         "

## 2018-06-29 NOTE — PROGRESS NOTES
SUBJECTIVE:   Deborah E Weil is a 53 year old female who presents to clinic today for the following health issues:    HPI  Joint Pain    Onset: January 2018    Description:   Location: left shoulder  Character: Sharp and Dull ache    Intensity: severe    Progression of Symptoms: same    Accompanying Signs & Symptoms:  Other symptoms: none    History:   Previous similar pain: no       Precipitating factors:   Trauma or overuse: no     Alleviating factors:  Improved by: ice    Therapies Tried and outcome: ice, chiropractic, massage. Temporary relief.     Patient reprots that since January 2018 she has been experiencing left shoulder and shoulder blade pain. She notes that at first it felt like her muscles here tight and so she got a massage and that seemed to help briefly. A couple weeks after her massage it started to worsen again. She notes that this past week she started to experience the pain in her right shoulder and up into her neck. She notes that she has no trouble raising her right arm above her head but she cant lift her left arm without pain. She notes that this winter she went to  a blanket with her left hand and she experienced a sharp shooting pain.     Denies any numbness or tingling in her arms. She states that she is sleeping fine.      Problem list and histories reviewed & adjusted, as indicated.  Additional history: as documented    Patient Active Problem List   Diagnosis     CARDIOVASCULAR SCREENING; LDL GOAL LESS THAN 160     Hypertension goal BP (blood pressure) < 140/90     Menopause     Past Surgical History:   Procedure Laterality Date     CHOLECYSTECTOMY, LAPOROSCOPIC  2004    Cholecystectomy, Laparoscopic     COLONOSCOPY WITH CO2 INSUFFLATION N/A 1/17/2017    Procedure: COLONOSCOPY WITH CO2 INSUFFLATION;  Surgeon: Duane, William Charles, MD;  Location:  OR       Social History   Substance Use Topics     Smoking status: Never Smoker     Smokeless tobacco: Never Used     Alcohol use  "Yes      Comment: Occ at social events, 4 drinks a month     Family History   Problem Relation Age of Onset     Diabetes Father      C.A.D. Father      MI at 40     Hypertension Father      Diabetes Sister      Diabetes Sister      Colon Cancer No family hx of          Current Outpatient Prescriptions   Medication Sig Dispense Refill     Ibuprofen (ADVIL PO)        losartan-hydrochlorothiazide (HYZAAR) 50-12.5 MG per tablet Take 1 tablet by mouth daily 90 tablet 3     Allergies   Allergen Reactions     Lisinopril Cough     Recent Labs   Lab Test  01/23/18   0829  06/05/17   0943   12/08/16   1135  03/18/15   0945  10/21/14   0852   LDL  96   --    --   134*   --   106   HDL  62   --    --   64   --   66   TRIG  76   --    --   62   --   87   ALT   --    --    --   28  24   --    CR  0.91  0.92   < >  0.80  0.80   --    GFRESTIMATED  65  65   < >  75  76   --    GFRESTBLACK  78  78   < >  >90   GFR Calc    >90   GFR Calc     --    POTASSIUM  3.4  4.1   < >  4.0  4.5   --    TSH  1.58   --    --    --    --   2.46    < > = values in this interval not displayed.      BP Readings from Last 3 Encounters:   06/29/18 118/82   01/23/18 116/80   05/23/17 110/88    Wt Readings from Last 3 Encounters:   06/29/18 205 lb 9.6 oz (93.3 kg)   01/23/18 199 lb (90.3 kg)   04/24/17 194 lb (88 kg)        ROS:  Constitutional, HEENT, cardiovascular, pulmonary, GI, , musculoskeletal, neuro, skin, endocrine and psych systems are negative, except as otherwise noted.    This document serves as a record of the services and decisions personally performed and made by Shaniqua Orlando CNP. It was created on his/her behalf by Dorie Colon, trained medical scribe. The creation of this document is based the provider's statements to the medical scribes.    Cher Colon 4:22 PM, June 29, 2018    OBJECTIVE:     /82  Pulse 80  Temp 98.9  F (37.2  C) (Temporal)  Resp 16  Ht 5' 6\" (1.676 m)  Wt 205 lb " 9.6 oz (93.3 kg)  LMP 09/26/2014  SpO2 97%  BMI 33.18 kg/m2  Body mass index is 33.18 kg/(m^2).     GENERAL: healthy, alert and no distress  MS: weakened abduction, full ROM but seems to need accessory muscle use for over head trapezeius region.  NEURO: Normal strength and tone, mentation intact and speech normal  PSYCH: mentation appears normal, affect normal/bright    Diagnostic Test Results:  No results found for this or any previous visit (from the past 24 hour(s)).    ASSESSMENT/PLAN:       ICD-10-CM    1. Left shoulder pain, unspecified chronicity M25.512 MR Shoulder Left w/o Contrast     Reviewed symptoms and etiology patient presents today. Scheduled MRI of left shoulder without contrast; patient will schedule. Advised icing the area and taking ibuprofen and Aleve prn for her pain.  Will refer based on imaging result.     Follow up with PCP upon MRI results or prn.    The information in this document, created by the medical scribe for me, accurately reflects the services I personally performed and the decisions made by me. I have reviewed and approved this document for accuracy prior to leaving the patient care area.  Shaniqua Orlando CNP  4:23 PM, 06/29/18      CAMILO Glez [facility]

## 2018-07-06 ENCOUNTER — RADIANT APPOINTMENT (OUTPATIENT)
Dept: MRI IMAGING | Facility: CLINIC | Age: 53
End: 2018-07-06
Attending: NURSE PRACTITIONER
Payer: COMMERCIAL

## 2018-07-06 DIAGNOSIS — M25.512 LEFT SHOULDER PAIN, UNSPECIFIED CHRONICITY: ICD-10-CM

## 2018-07-06 PROCEDURE — 73221 MRI JOINT UPR EXTREM W/O DYE: CPT | Mod: LT | Performed by: RADIOLOGY

## 2019-01-16 NOTE — PROGRESS NOTES
SUBJECTIVE:   CC: Deborah E Weil is an 53 year old woman who presents for preventive health visit.     Physical   Annual:     Getting at least 3 servings of Calcium per day:  Yes    Bi-annual eye exam:  NO    Dental care twice a year:  NO    Sleep apnea or symptoms of sleep apnea:  None    Diet:  Low salt    Frequency of exercise:  None    Taking medications regularly:  Yes    Medication side effects:  Not applicable    Additional concerns today:  No    PHQ-2 Total Score: 0    Hypertension Follow-up    Outpatient blood pressures are being checked at home.  Results of diastolic are around 90's    Low Salt Diet: no added salt    She notes that her fingers were more swollen and she felt bloated last week.      She continues to take her OTC zantac. She notes that she is planning on increasing her exercise and she relates that she can improve her nutrition.    Denies chest pain, unusual headaches, issues with urination, skin changes she I concerned about, vaginal bleeding, or shortness of breath.       Today's PHQ-2 Score:   PHQ-2 ( 1999 Pfizer) 1/25/2019   Q1: Little interest or pleasure in doing things 0   Q2: Feeling down, depressed or hopeless 0   PHQ-2 Score 0   Q1: Little interest or pleasure in doing things Not at all   Q2: Feeling down, depressed or hopeless Not at all   PHQ-2 Score 0     Abuse: Current or Past(Physical, Sexual or Emotional)- No  Do you feel safe in your environment? Yes    Social History     Tobacco Use     Smoking status: Never Smoker     Smokeless tobacco: Never Used   Substance Use Topics     Alcohol use: Yes     Comment: Occ at social events, 4 drinks a month     Alcohol Use 1/25/2019   If you drink alcohol do you typically have greater than 3 drinks per day OR greater than 7 drinks per week? No   No flowsheet data found.    Reviewed orders with patient.  Reviewed health maintenance and updated orders accordingly - Yes  BP Readings from Last 3 Encounters:   01/25/19 130/90   06/29/18  118/82   01/23/18 116/80    Wt Readings from Last 3 Encounters:   01/25/19 93 kg (205 lb)   06/29/18 93.3 kg (205 lb 9.6 oz)   01/23/18 90.3 kg (199 lb)        Patient Active Problem List   Diagnosis     CARDIOVASCULAR SCREENING; LDL GOAL LESS THAN 160     Hypertension goal BP (blood pressure) < 140/90     Menopause     Past Surgical History:   Procedure Laterality Date     CHOLECYSTECTOMY, LAPOROSCOPIC  2004    Cholecystectomy, Laparoscopic     COLONOSCOPY WITH CO2 INSUFFLATION N/A 1/17/2017    Procedure: COLONOSCOPY WITH CO2 INSUFFLATION;  Surgeon: Duane, William Charles, MD;  Location:  OR       Social History     Tobacco Use     Smoking status: Never Smoker     Smokeless tobacco: Never Used   Substance Use Topics     Alcohol use: Yes     Comment: Occ at social events, 4 drinks a month     Family History   Problem Relation Age of Onset     Diabetes Father      C.A.D. Father         MI at 40     Hypertension Father      Diabetes Sister      Diabetes Sister      Colon Cancer No family hx of          Current Outpatient Medications   Medication Sig Dispense Refill     losartan-hydrochlorothiazide (HYZAAR) 100-12.5 MG tablet Take 1 tablet by mouth daily 30 tablet 1     losartan-hydrochlorothiazide (HYZAAR) 50-12.5 MG per tablet Take 1 tablet by mouth daily 90 tablet 3     ranitidine (ZANTAC) 300 MG tablet Take by mouth as needed       Ibuprofen (ADVIL PO)        Allergies   Allergen Reactions     Lisinopril Cough     Recent Labs   Lab Test 01/23/18  0829 06/05/17  0943  12/08/16  1135 03/18/15  0945 10/21/14  0852   LDL 96  --   --  134*  --  106   HDL 62  --   --  64  --  66   TRIG 76  --   --  62  --  87   ALT  --   --   --  28 24  --    CR 0.91 0.92   < > 0.80 0.80  --    GFRESTIMATED 65 65   < > 75 76  --    GFRESTBLACK 78 78   < > >90   GFR Calc   >90   GFR Calc    --    POTASSIUM 3.4 4.1   < > 4.0 4.5  --    TSH 1.58  --   --   --   --  2.46    < > = values in this interval not  displayed.      Mammogram Screening: Patient over age 50, mutual decision to screen reflected in health maintenance. Pertinent mammograms are reviewed under the imaging tab.    History of abnormal Pap smear:   NO - age 30-65 PAP every 5 years with negative HPV co-testing recommended  Last 3 Pap and HPV Results:   PAP / HPV 10/21/2014   PAP NIL     PAP / HPV 10/21/2014   PAP NIL     Reviewed and updated as needed this visit by clinical staff  Tobacco  Allergies  Meds  Med Hx  Surg Hx  Fam Hx  Soc Hx      Reviewed and updated as needed this visit by Provider        Past Medical History:   Diagnosis Date     Hypertension      No active medical problems       Past Surgical History:   Procedure Laterality Date     CHOLECYSTECTOMY, LAPOROSCOPIC  2004    Cholecystectomy, Laparoscopic     COLONOSCOPY WITH CO2 INSUFFLATION N/A 2017    Procedure: COLONOSCOPY WITH CO2 INSUFFLATION;  Surgeon: Duane, William Charles, MD;  Location:  OR     Obstetric History       T2      L0     SAB0   TAB0   Ectopic0   Multiple0   Live Births0       # Outcome Date GA Lbr Triston/2nd Weight Sex Delivery Anes PTL Lv   2 Term            1 Term                 Review of Systems   Constitutional: Negative for chills and fever.   HENT: Negative for congestion, ear pain, hearing loss and sore throat.    Eyes: Negative for pain and visual disturbance.   Respiratory: Negative for cough and shortness of breath.    Cardiovascular: Negative for chest pain, palpitations and peripheral edema.   Gastrointestinal: Positive for heartburn. Negative for abdominal pain, constipation, diarrhea, hematochezia and nausea.   Breasts:  Negative for tenderness, breast mass and discharge.   Genitourinary: Negative for dysuria, frequency, genital sores, hematuria, pelvic pain, urgency, vaginal bleeding and vaginal discharge.   Musculoskeletal: Negative for arthralgias, joint swelling and myalgias.   Skin: Negative for rash.   Neurological: Negative  "for dizziness, weakness, headaches and paresthesias.   Psychiatric/Behavioral: Negative for mood changes. The patient is not nervous/anxious.      This document serves as a record of the services and decisions personally performed and made by Shaniqua Orlando CNP. It was created on his/her behalf by Dorie Colon, trained medical scribe. The creation of this document is based the provider's statements to the medical scribes.    Scribidalia Colon 1:03 PM, January 25, 2019  OBJECTIVE:   /90   Pulse 66   Temp 98  F (36.7  C) (Temporal)   Resp 14   Ht 1.676 m (5' 6\")   Wt 93 kg (205 lb)   LMP 09/26/2014   BMI 33.09 kg/m       Wt Readings from Last 5 Encounters:   01/25/19 93 kg (205 lb)   06/29/18 93.3 kg (205 lb 9.6 oz)   01/23/18 90.3 kg (199 lb)   04/24/17 88 kg (194 lb)   12/22/16 86.4 kg (190 lb 6.4 oz)      Physical Exam  GENERAL: healthy, alert and no distress  EYES: Eyes grossly normal to inspection, PERRL and conjunctivae and sclerae normal  HENT: ear canals and TM's normal, nose and mouth without ulcers or lesions  NECK: no adenopathy, no asymmetry, masses, or scars and thyroid normal to palpation  RESP: lungs clear to auscultation - no rales, rhonchi or wheezes  BREAST: normal without masses, tenderness or nipple discharge and no palpable axillary masses or adenopathy  CV: regular rate and rhythm, normal S1 S2, no S3 or S4, no murmur, click or rub, no peripheral edema and peripheral pulses strong  ABDOMEN: soft, nontender, no hepatosplenomegaly, no masses and bowel sounds normal  MS: no gross musculoskeletal defects noted, no edema  SKIN: no suspicious lesions or rashes  NEURO: Normal strength and tone, mentation intact and speech normal  PSYCH: mentation appears normal, affect normal/bright     Diagnostic Test Results:  No results found for this or any previous visit (from the past 24 hour(s)).    ASSESSMENT/PLAN:       ICD-10-CM    1. Encounter for routine adult health examination without " "abnormal findings Z00.00 Hemoglobin     Lipid panel reflex to direct LDL Fasting   2. Screening for HIV (human immunodeficiency virus) Z11.4 HIV Screening   3. Need for prophylactic vaccination and inoculation against influenza Z23    4. Essential hypertension I10 losartan-hydrochlorothiazide (HYZAAR) 100-12.5 MG tablet     Comprehensive metabolic panel   5. Visit for screening mammogram Z12.31 *MA Screening Digital Bilateral     Discussed hypertension, hyperlipidemia, menopause, healthy diet, and regular exercise at length with patient today. Physical exam with Pap test and HPV screening completed today. Updated screening labs with one-time HIV screening lab placed today. Will notify with results.     Blood pressure is not fully controlled in clinic or at home for patient. Medications are well tolerated with no reported side effects. Plan to increase Hyzaar  from 50-12.5 mg to 100-12.5 mg; Rx provided. Reviewed directions, benefits, and side effects of medication with patient today.     Digital bilateral screening mammogram referral placed for patient to schedule.     Discussed Shingrex with patient. She will follow up with her pharmacy benefit to receive.    Follow up with PCP in 3 weeks for hypertension medication management visit or prn.     COUNSELING:  Reviewed preventive health counseling, as reflected in patient instructions       Regular exercise       Healthy diet/nutrition       Immunizations    Vaccinated for: Influenza         Contraception       Safe sex practices/STD prevention       HIV screeninx in teen years, 1x in adult years, and at intervals if high risk       menopause management    BP Readings from Last 1 Encounters:   19 130/90     Estimated body mass index is 33.09 kg/m  as calculated from the following:    Height as of this encounter: 1.676 m (5' 6\").    Weight as of this encounter: 93 kg (205 lb).    Weight management plan: Discussed healthy diet and exercise guidelines     " reports that  has never smoked. she has never used smokeless tobacco.    Counseling Resources:  ATP IV Guidelines  Pooled Cohorts Equation Calculator  Breast Cancer Risk Calculator  FRAX Risk Assessment  ICSI Preventive Guidelines  Dietary Guidelines for Americans, 2010  USDA's MyPlate  ASA Prophylaxis  Lung CA Screening    The information in this document, created by the medical scribe for me, accurately reflects the services I personally performed and the decisions made by me. I have reviewed and approved this document for accuracy prior to leaving the patient care area.  Shaniqua Orlando CNP  1:03 PM, 01/25/19    CAMILO Glez CNP  [facility]

## 2019-01-25 ENCOUNTER — OFFICE VISIT (OUTPATIENT)
Dept: FAMILY MEDICINE | Facility: CLINIC | Age: 54
End: 2019-01-25
Payer: COMMERCIAL

## 2019-01-25 VITALS
HEART RATE: 66 BPM | WEIGHT: 205 LBS | SYSTOLIC BLOOD PRESSURE: 126 MMHG | TEMPERATURE: 98 F | DIASTOLIC BLOOD PRESSURE: 88 MMHG | BODY MASS INDEX: 32.95 KG/M2 | HEIGHT: 66 IN | RESPIRATION RATE: 14 BRPM

## 2019-01-25 DIAGNOSIS — I10 ESSENTIAL HYPERTENSION: ICD-10-CM

## 2019-01-25 DIAGNOSIS — R74.8 ELEVATED LIVER ENZYMES: ICD-10-CM

## 2019-01-25 DIAGNOSIS — Z12.31 VISIT FOR SCREENING MAMMOGRAM: ICD-10-CM

## 2019-01-25 DIAGNOSIS — Z23 NEED FOR PROPHYLACTIC VACCINATION AND INOCULATION AGAINST INFLUENZA: ICD-10-CM

## 2019-01-25 DIAGNOSIS — Z00.00 ENCOUNTER FOR ROUTINE ADULT HEALTH EXAMINATION WITHOUT ABNORMAL FINDINGS: Primary | ICD-10-CM

## 2019-01-25 DIAGNOSIS — E87.6 HYPOKALEMIA: ICD-10-CM

## 2019-01-25 DIAGNOSIS — Z11.4 SCREENING FOR HIV (HUMAN IMMUNODEFICIENCY VIRUS): ICD-10-CM

## 2019-01-25 LAB
ALBUMIN SERPL-MCNC: 4.1 G/DL (ref 3.4–5)
ALP SERPL-CCNC: 78 U/L (ref 40–150)
ALT SERPL W P-5'-P-CCNC: 60 U/L (ref 0–50)
ANION GAP SERPL CALCULATED.3IONS-SCNC: 8 MMOL/L (ref 3–14)
AST SERPL W P-5'-P-CCNC: 39 U/L (ref 0–45)
BILIRUB SERPL-MCNC: 0.4 MG/DL (ref 0.2–1.3)
BUN SERPL-MCNC: 16 MG/DL (ref 7–30)
CALCIUM SERPL-MCNC: 9.4 MG/DL (ref 8.5–10.1)
CHLORIDE SERPL-SCNC: 103 MMOL/L (ref 94–109)
CHOLEST SERPL-MCNC: 219 MG/DL
CO2 SERPL-SCNC: 27 MMOL/L (ref 20–32)
CREAT SERPL-MCNC: 0.86 MG/DL (ref 0.52–1.04)
GFR SERPL CREATININE-BSD FRML MDRD: 77 ML/MIN/{1.73_M2}
GLUCOSE SERPL-MCNC: 92 MG/DL (ref 70–99)
HDLC SERPL-MCNC: 65 MG/DL
HGB BLD-MCNC: 13.4 G/DL (ref 11.7–15.7)
LDLC SERPL CALC-MCNC: 141 MG/DL
NONHDLC SERPL-MCNC: 154 MG/DL
POTASSIUM SERPL-SCNC: 3.3 MMOL/L (ref 3.4–5.3)
PROT SERPL-MCNC: 7.9 G/DL (ref 6.8–8.8)
SODIUM SERPL-SCNC: 138 MMOL/L (ref 133–144)
TRIGL SERPL-MCNC: 63 MG/DL

## 2019-01-25 PROCEDURE — 80061 LIPID PANEL: CPT | Performed by: NURSE PRACTITIONER

## 2019-01-25 PROCEDURE — 87389 HIV-1 AG W/HIV-1&-2 AB AG IA: CPT | Performed by: NURSE PRACTITIONER

## 2019-01-25 PROCEDURE — 87624 HPV HI-RISK TYP POOLED RSLT: CPT | Performed by: NURSE PRACTITIONER

## 2019-01-25 PROCEDURE — 99396 PREV VISIT EST AGE 40-64: CPT | Mod: 25 | Performed by: NURSE PRACTITIONER

## 2019-01-25 PROCEDURE — 80053 COMPREHEN METABOLIC PANEL: CPT | Performed by: NURSE PRACTITIONER

## 2019-01-25 PROCEDURE — 99214 OFFICE O/P EST MOD 30 MIN: CPT | Mod: 25 | Performed by: NURSE PRACTITIONER

## 2019-01-25 PROCEDURE — 36415 COLL VENOUS BLD VENIPUNCTURE: CPT | Performed by: NURSE PRACTITIONER

## 2019-01-25 PROCEDURE — 85018 HEMOGLOBIN: CPT | Performed by: NURSE PRACTITIONER

## 2019-01-25 PROCEDURE — G0145 SCR C/V CYTO,THINLAYER,RESCR: HCPCS | Performed by: NURSE PRACTITIONER

## 2019-01-25 PROCEDURE — 90471 IMMUNIZATION ADMIN: CPT | Performed by: NURSE PRACTITIONER

## 2019-01-25 PROCEDURE — 90686 IIV4 VACC NO PRSV 0.5 ML IM: CPT | Performed by: NURSE PRACTITIONER

## 2019-01-25 RX ORDER — LOSARTAN POTASSIUM AND HYDROCHLOROTHIAZIDE 12.5; 1 MG/1; MG/1
1 TABLET ORAL DAILY
Qty: 30 TABLET | Refills: 1 | Status: SHIPPED | OUTPATIENT
Start: 2019-01-25 | End: 2019-03-27

## 2019-01-25 RX ORDER — LOSARTAN POTASSIUM AND HYDROCHLOROTHIAZIDE 12.5; 5 MG/1; MG/1
1 TABLET ORAL DAILY
Qty: 90 TABLET | Refills: 3 | Status: CANCELLED | OUTPATIENT
Start: 2019-01-25

## 2019-01-25 ASSESSMENT — ENCOUNTER SYMPTOMS
DIZZINESS: 0
COUGH: 0
NERVOUS/ANXIOUS: 0
DYSURIA: 0
CONSTIPATION: 0
CHILLS: 0
FEVER: 0
BREAST MASS: 0
ARTHRALGIAS: 0
HEMATOCHEZIA: 0
WEAKNESS: 0
HEMATURIA: 0
NAUSEA: 0
SHORTNESS OF BREATH: 0
PALPITATIONS: 0
DIARRHEA: 0
JOINT SWELLING: 0
HEARTBURN: 1
PARESTHESIAS: 0
HEADACHES: 0
FREQUENCY: 0
EYE PAIN: 0
MYALGIAS: 0
ABDOMINAL PAIN: 0
SORE THROAT: 0

## 2019-01-25 ASSESSMENT — PAIN SCALES - GENERAL: PAINLEVEL: NO PAIN (0)

## 2019-01-25 ASSESSMENT — MIFFLIN-ST. JEOR: SCORE: 1556.62

## 2019-01-25 NOTE — PROGRESS NOTES
Injectable Influenza Immunization Documentation    1.  Is the person to be vaccinated sick today?   No    2. Does the person to be vaccinated have an allergy to a component   of the vaccine?   No  Egg Allergy Algorithm Link    3. Has the person to be vaccinated ever had a serious reaction   to influenza vaccine in the past?   No    4. Has the person to be vaccinated ever had Guillain-Barré syndrome?   No    Form completed by Andree Rivera CMA ([facility])

## 2019-01-28 ENCOUNTER — MYC MEDICAL ADVICE (OUTPATIENT)
Dept: FAMILY MEDICINE | Facility: CLINIC | Age: 54
End: 2019-01-28

## 2019-01-28 DIAGNOSIS — R25.2 LEG CRAMPS: Primary | ICD-10-CM

## 2019-01-28 LAB — HIV 1+2 AB+HIV1 P24 AG SERPL QL IA: NONREACTIVE

## 2019-01-29 LAB
COPATH REPORT: NORMAL
PAP: NORMAL

## 2019-01-30 DIAGNOSIS — R25.2 LEG CRAMPS: ICD-10-CM

## 2019-01-30 LAB
ANION GAP SERPL CALCULATED.3IONS-SCNC: 6 MMOL/L (ref 3–14)
BUN SERPL-MCNC: 15 MG/DL (ref 7–30)
CALCIUM SERPL-MCNC: 9.7 MG/DL (ref 8.5–10.1)
CHLORIDE SERPL-SCNC: 104 MMOL/L (ref 94–109)
CO2 SERPL-SCNC: 29 MMOL/L (ref 20–32)
CREAT SERPL-MCNC: 0.86 MG/DL (ref 0.52–1.04)
GFR SERPL CREATININE-BSD FRML MDRD: 77 ML/MIN/{1.73_M2}
GLUCOSE SERPL-MCNC: 97 MG/DL (ref 70–99)
POTASSIUM SERPL-SCNC: 4.1 MMOL/L (ref 3.4–5.3)
SODIUM SERPL-SCNC: 139 MMOL/L (ref 133–144)

## 2019-01-30 PROCEDURE — 80048 BASIC METABOLIC PNL TOTAL CA: CPT | Performed by: NURSE PRACTITIONER

## 2019-01-30 PROCEDURE — 36415 COLL VENOUS BLD VENIPUNCTURE: CPT | Performed by: NURSE PRACTITIONER

## 2019-01-30 NOTE — TELEPHONE ENCOUNTER
Patient read my chart message and scheduled appointment. Closing encounter.     1/29/2019  8:57 AM Y Shaniqua Orlando, CAMILO CNP Patient Medical Advice Request  RE: Question about medications

## 2019-01-31 LAB
FINAL DIAGNOSIS: NORMAL
HPV HR 12 DNA CVX QL NAA+PROBE: NEGATIVE
HPV16 DNA SPEC QL NAA+PROBE: NEGATIVE
HPV18 DNA SPEC QL NAA+PROBE: NEGATIVE
SPECIMEN DESCRIPTION: NORMAL
SPECIMEN SOURCE CVX/VAG CYTO: NORMAL

## 2019-02-04 ENCOUNTER — TELEPHONE (OUTPATIENT)
Dept: OTHER | Facility: CLINIC | Age: 54
End: 2019-02-04

## 2019-02-04 NOTE — TELEPHONE ENCOUNTER
2/4/2019    Call Regarding Onboarding: P1 - Other    Attempt 1    Message on voicemail     Comments: 1 Spouse Dep      Outreach   KATERIN     Launch [employer] and print the 'Prescriptions from this Visit' Report

## 2019-02-14 NOTE — TELEPHONE ENCOUNTER
2/13/2019    Call Regarding Onboarding: P1 - Other     Attempt 2     Message on voicemail      Comments: 1 Spouse Dep    Outreach ,  Zoila Rivers

## 2019-02-18 DIAGNOSIS — E87.6 HYPOKALEMIA: ICD-10-CM

## 2019-02-18 DIAGNOSIS — R74.8 ELEVATED LIVER ENZYMES: ICD-10-CM

## 2019-02-18 LAB
ALBUMIN SERPL-MCNC: 3.7 G/DL (ref 3.4–5)
ALP SERPL-CCNC: 78 U/L (ref 40–150)
ALT SERPL W P-5'-P-CCNC: 38 U/L (ref 0–50)
AST SERPL W P-5'-P-CCNC: 24 U/L (ref 0–45)
BILIRUB DIRECT SERPL-MCNC: 0.1 MG/DL (ref 0–0.2)
BILIRUB SERPL-MCNC: 0.4 MG/DL (ref 0.2–1.3)
POTASSIUM SERPL-SCNC: 3.8 MMOL/L (ref 3.4–5.3)
PROT SERPL-MCNC: 7 G/DL (ref 6.8–8.8)

## 2019-02-18 PROCEDURE — 84132 ASSAY OF SERUM POTASSIUM: CPT | Performed by: NURSE PRACTITIONER

## 2019-02-18 PROCEDURE — 80076 HEPATIC FUNCTION PANEL: CPT | Performed by: NURSE PRACTITIONER

## 2019-02-18 PROCEDURE — 36415 COLL VENOUS BLD VENIPUNCTURE: CPT | Performed by: NURSE PRACTITIONER

## 2019-02-19 NOTE — TELEPHONE ENCOUNTER
2/19/2019    Call Regarding Onboarding P1 Other    Attempt 3    Message on voicemail     Comments: 1 ADULT      Outreach   CC

## 2019-03-27 DIAGNOSIS — I10 ESSENTIAL HYPERTENSION: ICD-10-CM

## 2019-03-27 RX ORDER — LOSARTAN POTASSIUM AND HYDROCHLOROTHIAZIDE 12.5; 1 MG/1; MG/1
TABLET ORAL
Qty: 90 TABLET | Refills: 1 | Status: SHIPPED | OUTPATIENT
Start: 2019-03-27 | End: 2019-09-25

## 2019-03-27 NOTE — TELEPHONE ENCOUNTER
Losartan-hydrochlorothiazide    Prescription approved per [facility] Refill Protocol.    Nasima Gill, RN, BSN

## 2019-09-25 DIAGNOSIS — I10 ESSENTIAL HYPERTENSION: ICD-10-CM

## 2019-09-25 RX ORDER — LOSARTAN POTASSIUM AND HYDROCHLOROTHIAZIDE 12.5; 1 MG/1; MG/1
TABLET ORAL
Qty: 90 TABLET | Refills: 0 | Status: SHIPPED | OUTPATIENT
Start: 2019-09-25 | End: 2020-01-02

## 2019-09-25 NOTE — TELEPHONE ENCOUNTER
Hyzaar  Prescription approved per [facility] Refill Protocol.    Sweetie Porter, RN, BSN

## 2019-11-04 ENCOUNTER — OFFICE VISIT (OUTPATIENT)
Dept: FAMILY MEDICINE | Facility: CLINIC | Age: 54
End: 2019-11-04
Payer: COMMERCIAL

## 2019-11-04 VITALS
TEMPERATURE: 98.7 F | HEIGHT: 66 IN | DIASTOLIC BLOOD PRESSURE: 94 MMHG | BODY MASS INDEX: 33.78 KG/M2 | WEIGHT: 210.2 LBS | HEART RATE: 90 BPM | OXYGEN SATURATION: 95 % | RESPIRATION RATE: 14 BRPM | SYSTOLIC BLOOD PRESSURE: 120 MMHG

## 2019-11-04 DIAGNOSIS — R10.32 LLQ ABDOMINAL PAIN: ICD-10-CM

## 2019-11-04 DIAGNOSIS — R14.0 ABDOMINAL BLOATING: ICD-10-CM

## 2019-11-04 DIAGNOSIS — I10 ESSENTIAL HYPERTENSION: ICD-10-CM

## 2019-11-04 DIAGNOSIS — R10.31 RLQ ABDOMINAL PAIN: Primary | ICD-10-CM

## 2019-11-04 LAB
ALBUMIN SERPL-MCNC: 4.1 G/DL (ref 3.4–5)
ALBUMIN UR-MCNC: NEGATIVE MG/DL
ALP SERPL-CCNC: 92 U/L (ref 40–150)
ALT SERPL W P-5'-P-CCNC: 48 U/L (ref 0–50)
AMYLASE SERPL-CCNC: 69 U/L (ref 30–110)
ANION GAP SERPL CALCULATED.3IONS-SCNC: 5 MMOL/L (ref 3–14)
APPEARANCE UR: CLEAR
AST SERPL W P-5'-P-CCNC: 29 U/L (ref 0–45)
BASOPHILS # BLD AUTO: 0.1 10E9/L (ref 0–0.2)
BASOPHILS NFR BLD AUTO: 0.6 %
BILIRUB SERPL-MCNC: 0.3 MG/DL (ref 0.2–1.3)
BILIRUB UR QL STRIP: NEGATIVE
BUN SERPL-MCNC: 16 MG/DL (ref 7–30)
CALCIUM SERPL-MCNC: 9.5 MG/DL (ref 8.5–10.1)
CHLORIDE SERPL-SCNC: 106 MMOL/L (ref 94–109)
CO2 SERPL-SCNC: 28 MMOL/L (ref 20–32)
COLOR UR AUTO: YELLOW
CREAT SERPL-MCNC: 0.78 MG/DL (ref 0.52–1.04)
DIFFERENTIAL METHOD BLD: NORMAL
EOSINOPHIL # BLD AUTO: 0.2 10E9/L (ref 0–0.7)
EOSINOPHIL NFR BLD AUTO: 1.9 %
ERYTHROCYTE [DISTWIDTH] IN BLOOD BY AUTOMATED COUNT: 14.6 % (ref 10–15)
GFR SERPL CREATININE-BSD FRML MDRD: 86 ML/MIN/{1.73_M2}
GLUCOSE SERPL-MCNC: 110 MG/DL (ref 70–99)
GLUCOSE UR STRIP-MCNC: NEGATIVE MG/DL
HCT VFR BLD AUTO: 41.5 % (ref 35–47)
HGB BLD-MCNC: 13.4 G/DL (ref 11.7–15.7)
HGB UR QL STRIP: NEGATIVE
KETONES UR STRIP-MCNC: NEGATIVE MG/DL
LEUKOCYTE ESTERASE UR QL STRIP: ABNORMAL
LIPASE SERPL-CCNC: 187 U/L (ref 73–393)
LYMPHOCYTES # BLD AUTO: 2.2 10E9/L (ref 0.8–5.3)
LYMPHOCYTES NFR BLD AUTO: 20.3 %
MCH RBC QN AUTO: 26.8 PG (ref 26.5–33)
MCHC RBC AUTO-ENTMCNC: 32.3 G/DL (ref 31.5–36.5)
MCV RBC AUTO: 83 FL (ref 78–100)
MONOCYTES # BLD AUTO: 1.1 10E9/L (ref 0–1.3)
MONOCYTES NFR BLD AUTO: 9.8 %
NEUTROPHILS # BLD AUTO: 7.4 10E9/L (ref 1.6–8.3)
NEUTROPHILS NFR BLD AUTO: 67.4 %
NITRATE UR QL: NEGATIVE
PH UR STRIP: 5.5 PH (ref 5–7)
PLATELET # BLD AUTO: 356 10E9/L (ref 150–450)
POTASSIUM SERPL-SCNC: 4 MMOL/L (ref 3.4–5.3)
PROT SERPL-MCNC: 7.9 G/DL (ref 6.8–8.8)
RBC # BLD AUTO: 5 10E12/L (ref 3.8–5.2)
RBC #/AREA URNS AUTO: NORMAL /HPF
SODIUM SERPL-SCNC: 139 MMOL/L (ref 133–144)
SOURCE: ABNORMAL
SP GR UR STRIP: 1.02 (ref 1–1.03)
UROBILINOGEN UR STRIP-ACNC: 0.2 EU/DL (ref 0.2–1)
WBC # BLD AUTO: 11 10E9/L (ref 4–11)
WBC #/AREA URNS AUTO: NORMAL /HPF

## 2019-11-04 PROCEDURE — 99214 OFFICE O/P EST MOD 30 MIN: CPT | Performed by: NURSE PRACTITIONER

## 2019-11-04 PROCEDURE — 36415 COLL VENOUS BLD VENIPUNCTURE: CPT | Performed by: NURSE PRACTITIONER

## 2019-11-04 PROCEDURE — 83690 ASSAY OF LIPASE: CPT | Performed by: NURSE PRACTITIONER

## 2019-11-04 PROCEDURE — 81001 URINALYSIS AUTO W/SCOPE: CPT | Performed by: NURSE PRACTITIONER

## 2019-11-04 PROCEDURE — 82150 ASSAY OF AMYLASE: CPT | Performed by: NURSE PRACTITIONER

## 2019-11-04 PROCEDURE — 85025 COMPLETE CBC W/AUTO DIFF WBC: CPT | Performed by: NURSE PRACTITIONER

## 2019-11-04 PROCEDURE — 80053 COMPREHEN METABOLIC PANEL: CPT | Performed by: NURSE PRACTITIONER

## 2019-11-04 RX ORDER — METOPROLOL SUCCINATE 25 MG/1
25 TABLET, EXTENDED RELEASE ORAL DAILY
Qty: 30 TABLET | Refills: 30 | Status: SHIPPED | OUTPATIENT
Start: 2019-11-04 | End: 2020-11-17

## 2019-11-04 ASSESSMENT — MIFFLIN-ST. JEOR: SCORE: 1575.21

## 2019-11-04 NOTE — PROGRESS NOTES
Subjective     Deborah E Weil is a 54 year old female who presents to clinic today for the following health issues:    Abdominal and back pain.     History of Present Illness        Back Pain:  She presents for follow up of back pain. Patient's back pain is a chronic problem.  Location of back pain:  Right lower back, left lower back and right middle of back  Description of back pain: dull ache, fullness and gnawing  Back pain spreads: right buttocks and left buttocks    Since patient first noticed back pain, pain is: always present, but gets better and worse  Does back pain interfere with her job:  No      She eats 2-3 servings of fruits and vegetables daily.She consumes 4 sweetened beverage(s) daily.  She is taking medications regularly.     For the past 2 weeks, but worse today, she has been experiencing suprapubic pressure and bloating with increased urinary frequency, but without vaginal symptoms, hematuria, fevers, or urinary urgency. She reports her pain is a 5-6/10 in intensity prior to taking 2 Aleve and about a 2-3/10 afterwards; she has taken 2 Aleve today and 2 yesterday. She has been alternating between heat and ice application without relief. She is having a bowel movement daily. No issues with food, nausea or vomiting. Has no real weight changes. Feeling uncomfortable and pain complaint is generalized in the back and abdomen. Feels bloated.     No fevers.       Hypertension Follow-up    Do you check your blood pressure regularly outside of the clinic? Yes . 140s/ 70s    Are you following a low salt diet? No    Are your blood pressures ever more than 140 on the top number (systolic) OR more   than 90 on the bottom number (diastolic), for example 140/90? Yes        Patient Active Problem List   Diagnosis     CARDIOVASCULAR SCREENING; LDL GOAL LESS THAN 160     Hypertension goal BP (blood pressure) < 140/90     Menopause     Past Surgical History:   Procedure Laterality Date     CHOLECYSTECTOMY,  LAPOROSCOPIC  2004    Cholecystectomy, Laparoscopic     COLONOSCOPY WITH CO2 INSUFFLATION N/A 1/17/2017    Procedure: COLONOSCOPY WITH CO2 INSUFFLATION;  Surgeon: Duane, William Charles, MD;  Location:  OR       Social History     Tobacco Use     Smoking status: Never Smoker     Smokeless tobacco: Never Used   Substance Use Topics     Alcohol use: Yes     Comment: Occ at social events, 4 drinks a month     Family History   Problem Relation Age of Onset     Diabetes Father      C.A.D. Father         MI at 40     Hypertension Father      Diabetes Sister      Diabetes Sister      Colon Cancer No family hx of          Current Outpatient Medications   Medication Sig Dispense Refill     Ibuprofen (ADVIL PO)        losartan-hydrochlorothiazide (HYZAAR) 100-12.5 MG tablet TAKE 1 TABLET BY MOUTH ONCE DAILY 90 tablet 0     metoprolol succinate ER (TOPROL-XL) 25 MG 24 hr tablet Take 1 tablet (25 mg) by mouth daily 30 tablet 30     ranitidine (ZANTAC) 300 MG tablet Take by mouth as needed       Allergies   Allergen Reactions     Lisinopril Cough     Recent Labs   Lab Test 02/18/19  0916 01/30/19  1117 01/25/19  1325 01/23/18  0829  12/08/16  1135  10/21/14  0852   LDL  --   --  141* 96  --  134*  --  106   HDL  --   --  65 62  --  64  --  66   TRIG  --   --  63 76  --  62  --  87   ALT 38  --  60*  --   --  28   < >  --    CR  --  0.86 0.86 0.91   < > 0.80   < >  --    GFRESTIMATED  --  77 77 65   < > 75   < >  --    GFRESTBLACK  --  89 89 78   < > >90   GFR Calc     < >  --    POTASSIUM 3.8 4.1 3.3* 3.4   < > 4.0   < >  --    TSH  --   --   --  1.58  --   --   --  2.46    < > = values in this interval not displayed.      BP Readings from Last 3 Encounters:   11/04/19 (!) 120/94   01/25/19 126/88   06/29/18 118/82    Wt Readings from Last 3 Encounters:   11/04/19 95.3 kg (210 lb 3.2 oz)   01/25/19 93 kg (205 lb)   06/29/18 93.3 kg (205 lb 9.6 oz)                    Reviewed and updated as needed this visit by  "Provider         Review of Systems   ROS COMP: Constitutional, HEENT, cardiovascular, pulmonary, GI, , musculoskeletal, neuro, skin, endocrine and psych systems are negative, except as otherwise noted.    This document serves as a record of the services and decisions personally performed and made by Shaniqua Orlando CNP. It was created on his/her behalf by Dorie Colon, trained medical scribe. The creation of this document is based the provider's statements to the medical scribes.    Scribe Dorie Colon 1:51 PM, November 4, 2019        Objective    BP (!) 120/94   Pulse 90   Temp 98.7  F (37.1  C) (Oral)   Resp 14   Ht 1.676 m (5' 6\")   Wt 95.3 kg (210 lb 3.2 oz)   LMP 09/26/2014 (Exact Date)   SpO2 95%   Breastfeeding? No   BMI 33.93 kg/m    Body mass index is 33.93 kg/m .     Physical Exam   GENERAL: healthy, alert and no distress  HENT: ear canals and TM's normal, nose and mouth without ulcers or lesions  NECK: no adenopathy, no asymmetry, masses, or scars and thyroid normal to palpation  RESP: lungs clear to auscultation - no rales, rhonchi or wheezes  CV: regular rate and rhythm, normal S1 S2, no S3 or S4, no murmur, click or rub, no peripheral edema and peripheral pulses strong  ABDOMEN: soft, mild epigastric and LUQ tenderness, RLQ tenderness to palpation, no rebound or guarding, negative psoas muscle testing, no hepatosplenomegaly, no masses and bowel sounds normal, is obese appearing and normal BS- pt. States feels bloated.   MS: no gross musculoskeletal defects noted, no edema, cyst near left SI joint, soft, fluid/fluctuant and non-tender approximately 4-5 cm in diameter  SKIN: no suspicious lesions or rashes    Diagnostic Test Results:  Labs reviewed in Epic  Results for orders placed or performed in visit on 11/04/19 (from the past 24 hour(s))   CBC with platelets and differential   Result Value Ref [facility]    WBC 11.0 4.0 - 11.0 10e9/L    RBC Count 5.00 3.8 - 5.2 10e12/L    Hemoglobin 13.4 11.7 " - 15.7 g/dL    Hematocrit 41.5 35.0 - 47.0 %    MCV 83 78 - 100 fl    MCH 26.8 26.5 - 33.0 pg    MCHC 32.3 31.5 - 36.5 g/dL    RDW 14.6 10.0 - 15.0 %    Platelet Count 356 150 - 450 10e9/L    % Neutrophils 67.4 %    % Lymphocytes 20.3 %    % Monocytes 9.8 %    % Eosinophils 1.9 %    % Basophils 0.6 %    Absolute Neutrophil 7.4 1.6 - 8.3 10e9/L    Absolute Lymphocytes 2.2 0.8 - 5.3 10e9/L    Absolute Monocytes 1.1 0.0 - 1.3 10e9/L    Absolute Eosinophils 0.2 0.0 - 0.7 10e9/L    Absolute Basophils 0.1 0.0 - 0.2 10e9/L    Diff Method Automated Method    *UA reflex to Microscopic and Culture ([facility] (except [place] and [place])   Result Value Ref [facility]    Color Urine Yellow     Appearance Urine Clear     Glucose Urine Negative NEG^Negative mg/dL    Bilirubin Urine Negative NEG^Negative    Ketones Urine Negative NEG^Negative mg/dL    Specific Gravity Urine 1.020 1.003 - 1.035    Blood Urine Negative NEG^Negative    pH Urine 5.5 5.0 - 7.0 pH    Protein Albumin Urine Negative NEG^Negative mg/dL    Urobilinogen Urine 0.2 0.2 - 1.0 EU/dL    Nitrite Urine Negative NEG^Negative    Leukocyte Esterase Urine Small (A) NEG^Negative    Source Midstream Urine    Urine Microscopic   Result Value Ref [facility]    WBC Urine 0 - 5 OTO5^0 - 5 /HPF    RBC Urine O - 2 OTO2^O - 2 /HPF           Assessment & Plan       ICD-10-CM    1. RLQ abdominal pain R10.31 Comprehensive metabolic panel     CBC with platelets and differential     Amylase     Lipase     *UA reflex to Microscopic and Culture ([facility] (except [place] and [place])     Urine Microscopic     CT Abdomen Pelvis w Contrast   2. LLQ abdominal pain R10.32 Comprehensive metabolic panel     CBC with platelets and differential     Amylase     Lipase     *UA reflex to Microscopic and Culture ([facility] (except [place] and [place])     CT Abdomen Pelvis w Contrast   3. Essential hypertension I10 metoprolol succinate ER  "(TOPROL-XL) 25 MG 24 hr tablet     Comprehensive metabolic panel   4. Abdominal bloating R14.0      Reviewed symptoms and etiology patient presents with today. Unclear etiology of pain and bloating symptom.   CMP, amylase, and lipase labs placed today; will notify with results. UA with reflex to microscopic and culture lab placed with CBC with platelets and differential lab placed today; result showed normal result other than small leukocyte esterase in the urine sample. Advised that she have a CT with contrast of her abdomen and pelvis; referral provided for patient to schedule. Discussed home cares and warning signs to monitor for and report to the ED if they appear.  Discussed diverticulitis vs. Other.     Discussed hypertension, healthy diet, and regular exercise at length with patient today. Diastolic blood pressure is elevated initially and upon recheck. Advised starting Toprol XL 25 mg; Rx provided .Reviewed directions, benefits, and side effects of medication with patient today.     Follow up with PCP in 2 weeks for blood pressure medication management issue or prn.      BMI:   Estimated body mass index is 33.93 kg/m  as calculated from the following:    Height as of this encounter: 1.676 m (5' 6\").    Weight as of this encounter: 95.3 kg (210 lb 3.2 oz).   Weight management plan: Discussed healthy diet and exercise guidelines    The information in this document, created by the medical scribe for me, accurately reflects the services I personally performed and the decisions made by me. I have reviewed and approved this document for accuracy prior to leaving the patient care area.  Shaniqua Orlando CNP  1:51 PM, 11/04/19    [facility])"

## 2019-11-05 ENCOUNTER — MYC MEDICAL ADVICE (OUTPATIENT)
Dept: FAMILY MEDICINE | Facility: CLINIC | Age: 54
End: 2019-11-05

## 2019-11-06 ENCOUNTER — HOSPITAL ENCOUNTER (OUTPATIENT)
Dept: CT IMAGING | Facility: CLINIC | Age: 54
Discharge: HOME | End: 2019-11-06
Attending: NURSE PRACTITIONER | Admitting: NURSE PRACTITIONER
Payer: COMMERCIAL

## 2019-11-06 DIAGNOSIS — R10.31 RLQ ABDOMINAL PAIN: ICD-10-CM

## 2019-11-06 DIAGNOSIS — R10.32 LLQ ABDOMINAL PAIN: ICD-10-CM

## 2019-11-06 PROCEDURE — 25000128 H RX IP 250 OP 636: Performed by: NURSE PRACTITIONER

## 2019-11-06 PROCEDURE — 25000125 ZZHC RX 250: Performed by: NURSE PRACTITIONER

## 2019-11-06 PROCEDURE — 74177 CT ABD & PELVIS W/CONTRAST: CPT

## 2019-11-06 RX ORDER — IOPAMIDOL 755 MG/ML
500 INJECTION, SOLUTION INTRAVASCULAR ONCE
Status: COMPLETED | OUTPATIENT
Start: 2019-11-06 | End: 2019-11-06

## 2019-11-06 RX ADMIN — SODIUM CHLORIDE 60 ML: 9 INJECTION, SOLUTION INTRAVENOUS at 10:44

## 2019-11-06 RX ADMIN — IOPAMIDOL 100 ML: 755 INJECTION, SOLUTION INTRAVENOUS at 10:44

## 2019-11-10 ENCOUNTER — HOSPITAL ENCOUNTER (EMERGENCY)
Facility: CLINIC | Age: 54
Discharge: HOME | End: 2019-11-10
Attending: NURSE PRACTITIONER | Admitting: NURSE PRACTITIONER
Payer: COMMERCIAL

## 2019-11-10 ENCOUNTER — APPOINTMENT (OUTPATIENT)
Dept: GENERAL RADIOLOGY | Facility: CLINIC | Age: 54
End: 2019-11-10
Attending: NURSE PRACTITIONER
Payer: COMMERCIAL

## 2019-11-10 VITALS
WEIGHT: 207.8 LBS | SYSTOLIC BLOOD PRESSURE: 176 MMHG | BODY MASS INDEX: 32.62 KG/M2 | HEART RATE: 81 BPM | OXYGEN SATURATION: 100 % | HEIGHT: 67 IN | RESPIRATION RATE: 14 BRPM | TEMPERATURE: 98.4 F | DIASTOLIC BLOOD PRESSURE: 114 MMHG

## 2019-11-10 DIAGNOSIS — K59.01 SLOW TRANSIT CONSTIPATION: Primary | ICD-10-CM

## 2019-11-10 LAB
ALBUMIN SERPL-MCNC: 3.9 G/DL (ref 3.4–5)
ALBUMIN UR-MCNC: NEGATIVE MG/DL
ALP SERPL-CCNC: 81 U/L (ref 40–150)
ALT SERPL W P-5'-P-CCNC: 37 U/L (ref 0–50)
ANION GAP SERPL CALCULATED.3IONS-SCNC: 3 MMOL/L (ref 3–14)
APPEARANCE UR: CLEAR
AST SERPL W P-5'-P-CCNC: 22 U/L (ref 0–45)
BASOPHILS # BLD AUTO: 0.1 10E9/L (ref 0–0.2)
BASOPHILS NFR BLD AUTO: 1.3 %
BILIRUB SERPL-MCNC: 0.4 MG/DL (ref 0.2–1.3)
BILIRUB UR QL STRIP: NEGATIVE
BUN SERPL-MCNC: 14 MG/DL (ref 7–30)
CALCIUM SERPL-MCNC: 9.4 MG/DL (ref 8.5–10.1)
CHLORIDE SERPL-SCNC: 107 MMOL/L (ref 94–109)
CO2 SERPL-SCNC: 32 MMOL/L (ref 20–32)
COLOR UR AUTO: ABNORMAL
CREAT SERPL-MCNC: 0.89 MG/DL (ref 0.52–1.04)
CRP SERPL-MCNC: 8.4 MG/L (ref 0–8)
DIFFERENTIAL METHOD BLD: NORMAL
EOSINOPHIL NFR BLD AUTO: 2.4 %
ERYTHROCYTE [DISTWIDTH] IN BLOOD BY AUTOMATED COUNT: 13.8 % (ref 10–15)
GFR SERPL CREATININE-BSD FRML MDRD: 74 ML/MIN/{1.73_M2}
GLUCOSE SERPL-MCNC: 93 MG/DL (ref 70–99)
GLUCOSE UR STRIP-MCNC: NEGATIVE MG/DL
HCT VFR BLD AUTO: 41.3 % (ref 35–47)
HGB BLD-MCNC: 13.3 G/DL (ref 11.7–15.7)
HGB UR QL STRIP: NEGATIVE
IMM GRANULOCYTES # BLD: 0 10E9/L (ref 0–0.4)
IMM GRANULOCYTES NFR BLD: 0.4 %
KETONES UR STRIP-MCNC: NEGATIVE MG/DL
LEUKOCYTE ESTERASE UR QL STRIP: NEGATIVE
LIPASE SERPL-CCNC: 152 U/L (ref 73–393)
LYMPHOCYTES # BLD AUTO: 1.8 10E9/L (ref 0.8–5.3)
LYMPHOCYTES NFR BLD AUTO: 24.1 %
MCH RBC QN AUTO: 26.7 PG (ref 26.5–33)
MCHC RBC AUTO-ENTMCNC: 32.2 G/DL (ref 31.5–36.5)
MCV RBC AUTO: 83 FL (ref 78–100)
MONOCYTES # BLD AUTO: 0.8 10E9/L (ref 0–1.3)
MONOCYTES NFR BLD AUTO: 10.4 %
MUCOUS THREADS #/AREA URNS LPF: PRESENT /LPF
NEUTROPHILS # BLD AUTO: 4.6 10E9/L (ref 1.6–8.3)
NEUTROPHILS NFR BLD AUTO: 61.4 %
NITRATE UR QL: NEGATIVE
NRBC # BLD AUTO: 0 10*3/UL
NRBC BLD AUTO-RTO: 0 /100
PH UR STRIP: 7 PH (ref 5–7)
PLATELET # BLD AUTO: 356 10E9/L (ref 150–450)
POTASSIUM SERPL-SCNC: 3.8 MMOL/L (ref 3.4–5.3)
PROT SERPL-MCNC: 7.5 G/DL (ref 6.8–8.8)
RBC # BLD AUTO: 4.98 10E12/L (ref 3.8–5.2)
RBC #/AREA URNS AUTO: 0 /HPF (ref 0–2)
SODIUM SERPL-SCNC: 142 MMOL/L (ref 133–144)
SOURCE: ABNORMAL
SP GR UR STRIP: 1.01 (ref 1–1.03)
SQUAMOUS #/AREA URNS AUTO: 1 /HPF (ref 0–1)
UROBILINOGEN UR STRIP-MCNC: 0 MG/DL (ref 0–2)
WBC # BLD AUTO: 7.4 10E9/L (ref 4–11)
WBC #/AREA URNS AUTO: 0 /HPF (ref 0–5)

## 2019-11-10 PROCEDURE — 80053 COMPREHEN METABOLIC PANEL: CPT | Performed by: NURSE PRACTITIONER

## 2019-11-10 PROCEDURE — 74019 RADEX ABDOMEN 2 VIEWS: CPT | Mod: TC

## 2019-11-10 PROCEDURE — 99284 EMERGENCY DEPT VISIT MOD MDM: CPT | Performed by: NURSE PRACTITIONER

## 2019-11-10 PROCEDURE — 86140 C-REACTIVE PROTEIN: CPT | Performed by: NURSE PRACTITIONER

## 2019-11-10 PROCEDURE — 99284 EMERGENCY DEPT VISIT MOD MDM: CPT | Mod: Z6 | Performed by: NURSE PRACTITIONER

## 2019-11-10 PROCEDURE — 83690 ASSAY OF LIPASE: CPT | Performed by: NURSE PRACTITIONER

## 2019-11-10 PROCEDURE — 85025 COMPLETE CBC W/AUTO DIFF WBC: CPT | Performed by: NURSE PRACTITIONER

## 2019-11-10 PROCEDURE — 81001 URINALYSIS AUTO W/SCOPE: CPT | Performed by: NURSE PRACTITIONER

## 2019-11-10 RX ORDER — POLYETHYLENE GLYCOL 3350 17 G/17G
1 POWDER, FOR SOLUTION ORAL DAILY
Qty: 527 G | Refills: 0 | Status: SHIPPED | OUTPATIENT
Start: 2019-11-10 | End: 2020-02-17

## 2019-11-10 ASSESSMENT — ENCOUNTER SYMPTOMS
NAUSEA: 0
ABDOMINAL PAIN: 1
CONFUSION: 0
HEADACHES: 0
EYE REDNESS: 0
DIFFICULTY URINATING: 0
COLOR CHANGE: 0
NECK STIFFNESS: 0
VOMITING: 0
SHORTNESS OF BREATH: 0
ABDOMINAL DISTENTION: 1
FEVER: 0
ARTHRALGIAS: 0

## 2019-11-10 ASSESSMENT — MIFFLIN-ST. JEOR: SCORE: 1580.2

## 2019-11-10 NOTE — ED PROVIDER NOTES
"  History     Chief Complaint   Patient presents with     Abdominal Pain     The history is provided by the patient.     Deborah E Weil is a 54 year old female who presents to the ED complaining of abd pain and distension that has been ongoing since 11/4/19 (6 days ago). Patient was evaluated by her PCP and did have a CT scan completed which did not show any findings as to her symptoms. She does not have a h/o chron's or colitis, her BM's have been normal. Patient feels like her distension has increased and it \"feels pushed out\", she states that her abd is 80% more distended than her normal. Denies any loss of appetite,nausea and vomiting. Patient was offered pain meds for her discomfort which she declined.     Allergies:  Allergies   Allergen Reactions     Lisinopril Cough       Problem List:    Patient Active Problem List    Diagnosis Date Noted     Menopause 01/23/2018     Priority: Medium     Hypertension goal BP (blood pressure) < 140/90 12/08/2016     Priority: Medium     CARDIOVASCULAR SCREENING; LDL GOAL LESS THAN 160 10/21/2014     Priority: Medium        Past Medical History:    Past Medical History:   Diagnosis Date     Hypertension      No active medical problems        Past Surgical History:    Past Surgical History:   Procedure Laterality Date     CHOLECYSTECTOMY, LAPOROSCOPIC  2004    Cholecystectomy, Laparoscopic     COLONOSCOPY WITH CO2 INSUFFLATION N/A 1/17/2017    Procedure: COLONOSCOPY WITH CO2 INSUFFLATION;  Surgeon: Duane, William Charles, MD;  Location:  OR       Family History:    Family History   Problem Relation Age of Onset     Diabetes Father      C.A.D. Father         MI at 40     Hypertension Father      Diabetes Sister      Diabetes Sister      Colon Cancer No family hx of        Social History:  Marital Status:   [2]  Social History     Tobacco Use     Smoking status: Never Smoker     Smokeless tobacco: Never Used   Substance Use Topics     Alcohol use: Yes     Comment: Occ " "at social events, 4 drinks a month     Drug use: No        Medications:    Ibuprofen (ADVIL PO)  losartan-hydrochlorothiazide (HYZAAR) 100-12.5 MG tablet  metoprolol succinate ER (TOPROL-XL) 25 MG 24 hr tablet  ranitidine (ZANTAC) 300 MG tablet          Review of Systems   Constitutional: Negative for fever.   HENT: Negative for congestion.    Eyes: Negative for redness.   Respiratory: Negative for shortness of breath.    Cardiovascular: Negative for chest pain.   Gastrointestinal: Positive for abdominal distention and abdominal pain. Negative for nausea and vomiting.   Genitourinary: Negative for difficulty urinating.   Musculoskeletal: Negative for arthralgias and neck stiffness.   Skin: Negative for color change.   Neurological: Negative for headaches.   Psychiatric/Behavioral: Negative for confusion.   All other systems reviewed and are negative.      Physical Exam   BP: (!) 176/114  Pulse: 81  Temp: 98.4  F (36.9  C)  Resp: 14  Height: 170.2 cm (5' 7\")  Weight: 94.3 kg (207 lb 12.8 oz)  SpO2: 100 %      Physical Exam  Vitals signs and nursing note reviewed.   Constitutional:       General: She is not in acute distress.     Appearance: She is well-developed. She is not diaphoretic.   HENT:      Head: Normocephalic and atraumatic.      Right Ear: External ear normal.      Left Ear: External ear normal.      Nose: Nose normal.      Mouth/Throat:      Pharynx: No oropharyngeal exudate.   Eyes:      General:         Right eye: No discharge.         Left eye: No discharge.      Conjunctiva/sclera: Conjunctivae normal.      Pupils: Pupils are equal, round, and reactive to light.   Neck:      Musculoskeletal: Normal range of motion and neck supple.      Thyroid: No thyromegaly.   Cardiovascular:      Rate and Rhythm: Normal rate and regular rhythm.      Heart sounds: Normal heart sounds. No murmur.   Pulmonary:      Effort: Pulmonary effort is normal. No respiratory distress.      Breath sounds: Normal breath sounds. " No wheezing or rales.   Chest:      Chest wall: No tenderness.   Abdominal:      General: Bowel sounds are normal. There is distension.      Palpations: Abdomen is soft. There is no mass.      Tenderness: There is generalized tenderness. There is no guarding or rebound.      Hernia: No hernia is present.   Musculoskeletal: Normal range of motion.         General: No deformity.   Lymphadenopathy:      Cervical: No cervical adenopathy.   Skin:     General: Skin is warm.      Capillary Refill: Capillary refill takes less than 2 seconds.      Findings: No erythema or rash.   Neurological:      Mental Status: She is alert and oriented to person, place, and time.      Cranial Nerves: No cranial nerve deficit.         ED Course     Procedures    Results for orders placed or performed during the hospital encounter of 11/10/19 (from the past 24 hour(s))   CBC with platelets differential   Result Value Ref [facility]    WBC 7.4 4.0 - 11.0 10e9/L    RBC Count 4.98 3.8 - 5.2 10e12/L    Hemoglobin 13.3 11.7 - 15.7 g/dL    Hematocrit 41.3 35.0 - 47.0 %    MCV 83 78 - 100 fl    MCH 26.7 26.5 - 33.0 pg    MCHC 32.2 31.5 - 36.5 g/dL    RDW 13.8 10.0 - 15.0 %    Platelet Count 356 150 - 450 10e9/L    Diff Method Automated Method     % Neutrophils 61.4 %    % Lymphocytes 24.1 %    % Monocytes 10.4 %    % Eosinophils 2.4 %    % Basophils 1.3 %    % Immature Granulocytes 0.4 %    Nucleated RBCs 0 0 /100    Absolute Neutrophil 4.6 1.6 - 8.3 10e9/L    Absolute Lymphocytes 1.8 0.8 - 5.3 10e9/L    Absolute Monocytes 0.8 0.0 - 1.3 10e9/L    Absolute Basophils 0.1 0.0 - 0.2 10e9/L    Abs Immature Granulocytes 0.0 0 - 0.4 10e9/L    Absolute Nucleated RBC 0.0    Comprehensive metabolic panel   Result Value Ref [facility]    Sodium 142 133 - 144 mmol/L    Potassium 3.8 3.4 - 5.3 mmol/L    Chloride 107 94 - 109 mmol/L    Carbon Dioxide 32 20 - 32 mmol/L    Anion Gap 3 3 - 14 mmol/L    Glucose 93 70 - 99 mg/dL    Urea Nitrogen 14 7 - 30 mg/dL    Creatinine  0.89 0.52 - 1.04 mg/dL    GFR Estimate 74 >60 mL/min/[1.73_m2]    GFR Estimate If Black 86 >60 mL/min/[1.73_m2]    Calcium 9.4 8.5 - 10.1 mg/dL    Bilirubin Total 0.4 0.2 - 1.3 mg/dL    Albumin 3.9 3.4 - 5.0 g/dL    Protein Total 7.5 6.8 - 8.8 g/dL    Alkaline Phosphatase 81 40 - 150 U/L    ALT 37 0 - 50 U/L    AST 22 0 - 45 U/L   Lipase   Result Value Ref [facility]    Lipase 152 73 - 393 U/L   CRP inflammation   Result Value Ref [facility]    CRP Inflammation 8.4 (H) 0.0 - 8.0 mg/L   UA with Microscopic   Result Value Ref [facility]    Color Urine Straw     Appearance Urine Clear     Glucose Urine Negative NEG^Negative mg/dL    Bilirubin Urine Negative NEG^Negative    Ketones Urine Negative NEG^Negative mg/dL    Specific Gravity Urine 1.009 1.003 - 1.035    Blood Urine Negative NEG^Negative    pH Urine 7.0 5.0 - 7.0 pH    Protein Albumin Urine Negative NEG^Negative mg/dL    Urobilinogen mg/dL 0.0 0.0 - 2.0 mg/dL    Nitrite Urine Negative NEG^Negative    Leukocyte Esterase Urine Negative NEG^Negative    Source Midstream Urine     WBC Urine 0 0 - 5 /HPF    RBC Urine 0 0 - 2 /HPF    Squamous Epithelial /HPF Urine 1 0 - 1 /HPF    Mucous Urine Present (A) NEG^Negative /LPF   XR Abdomen 2 Views    Narrative    XR ABDOMEN TWO VIEWS   11/10/2019 1:57 PM     HISTORY: Abdominal distention.    COMPARISON: 12/8/2016      Impression    IMPRESSION: Moderate stool burden seen throughout the colon.  Nonobstructive bowel gas pattern. Surgical clips seen overlying the  right upper quadrant. Multilevel degenerative changes seen in the  spine. Visualized lung bases are unremarkable.    DAYA AHN MD       Medications - No data to display    Assessments & Plan (with Medical Decision Making)  Slow transit constipation.  Blood work remains reassuring.  Abdominal x-ray shows moderate stool burden seen throughout the colon with no signs of obstruction.  I am going to put patient on MiraLAX for the next week to see if this helps with her feelings of  distention and fullness.  GI referral was made for further work-up if this is not helpful.  Reasons to return to the emergency room were discussed.  Patient is agreeable to plan of care and discharged in stable condition.     I have reviewed the nursing notes.    I have reviewed the findings, diagnosis, plan and need for follow up with the patient.    Discharge Medication List as of 11/10/2019  2:20 PM      START taking these medications    Details   polyethylene glycol (MIRALAX) powder Take 17 g (1 capful) by mouth daily, Disp-527 g, R-0, E-Prescribe             Final diagnoses:   Slow transit constipation     This document serves as a record of services personally performed by Josefina Gentile CNP.  It was created on their behalf by Vilma Barrett, a trained medical scribe. The creation of this record is based on the provider's personal observations and the statements of the patient. This document has been checked and approved by the attending provider.    Disclaimer : This note consists of symbols derived from keyboarding, dictation and/or voice recognition software. As a result, there may be errors in the script that have gone undetected. Please consider this when interpreting information found in this chart.      11/10/2019   [facility] EMERGENCY DEPARTMENT     Lily Gentile APRN CNP  11/10/19 2229

## 2019-11-10 NOTE — ED AVS SNAPSHOT
[facility] Emergency Department  [address] [place] [address]  Phone:  [phone]  Fax:  [phone]                                    Deborah E Weil   MRN: [id number]    Department:  [facility] Emergency Department   Date of Visit:  11/10/2019           After Visit Summary Signature Page    I have received my discharge instructions, and my questions have been answered. I have discussed any challenges I see with this plan with the nurse or doctor.    ..........................................................................................................................................  Patient/Patient Representative Signature      ..........................................................................................................................................  Patient Representative Print Name and Relationship to Patient    ..................................................               ................................................  Date                                   Time    ..........................................................................................................................................  Reviewed by Signature/Title    ...................................................              ..............................................  Date                                               Time          22EPIC Rev 08/18

## 2019-11-10 NOTE — LETTER
November 10, 2019      To Whom It May Concern:      Deborah E Weil was seen in our Emergency Department today, 11/10/19.  Please excuse her from work tomorrow and Tuesday, she can return on Wednesday without restrictions.    Thank you      Sincerely,        CAMILO Lance CNP

## 2019-11-10 NOTE — ED TRIAGE NOTES
Pt arrives with upper abdominal pain x 1 week, See in ED earlier this week and they did a CT and essentially told her to lose weight per her understanding. Pain has persisted and now has numbness to legs and lightheadedness.

## 2019-11-19 ENCOUNTER — TELEPHONE (OUTPATIENT)
Dept: FAMILY MEDICINE | Facility: CLINIC | Age: 54
End: 2019-11-19

## 2019-11-19 NOTE — TELEPHONE ENCOUNTER
Summary:    Patient is due/failing the following:   BP CHECK    Action needed:   Patient needs nurse only appointment.    Type of outreach:    Phone, left message for patient to call back.     Questions for provider review:    None                                                                                                                                    Janelle Wilkes CMA       Chart routed to Care Team .

## 2019-11-25 ENCOUNTER — ALLIED HEALTH/NURSE VISIT (OUTPATIENT)
Dept: FAMILY MEDICINE | Facility: CLINIC | Age: 54
End: 2019-11-25
Payer: COMMERCIAL

## 2019-11-25 VITALS — HEART RATE: 74 BPM | DIASTOLIC BLOOD PRESSURE: 84 MMHG | SYSTOLIC BLOOD PRESSURE: 118 MMHG

## 2019-11-25 DIAGNOSIS — I10 ESSENTIAL HYPERTENSION: Primary | ICD-10-CM

## 2019-11-25 PROCEDURE — 99207 ZZC NO CHARGE NURSE ONLY: CPT

## 2019-11-25 NOTE — NURSING NOTE
Deborah E Weil is a 54 year old patient who comes in today for a Blood Pressure check.  Initial BP:  /84   Pulse 74   LMP 09/26/2014 (Exact Date)      74  Disposition: follow-up as previously indicated by provider and results routed to provider  Elisa Palacios MA on 11/25/2019 at 4:09 PM

## 2020-01-01 DIAGNOSIS — I10 ESSENTIAL HYPERTENSION: ICD-10-CM

## 2020-01-02 RX ORDER — LOSARTAN POTASSIUM AND HYDROCHLOROTHIAZIDE 12.5; 1 MG/1; MG/1
TABLET ORAL
Qty: 90 TABLET | Refills: 2 | Status: SHIPPED | OUTPATIENT
Start: 2020-01-02 | End: 2020-10-12

## 2020-01-03 ENCOUNTER — MYC MEDICAL ADVICE (OUTPATIENT)
Dept: FAMILY MEDICINE | Facility: CLINIC | Age: 55
End: 2020-01-03

## 2020-01-03 DIAGNOSIS — I10 HYPERTENSION GOAL BP (BLOOD PRESSURE) < 140/90: Primary | ICD-10-CM

## 2020-01-03 RX ORDER — HYDROCHLOROTHIAZIDE 12.5 MG/1
12.5 TABLET ORAL DAILY
Qty: 90 TABLET | Refills: 0 | Status: SHIPPED | OUTPATIENT
Start: 2020-01-03 | End: 2021-06-10 | Stop reason: ALTCHOICE

## 2020-01-03 RX ORDER — LOSARTAN POTASSIUM 100 MG/1
100 TABLET ORAL DAILY
Qty: 90 TABLET | Refills: 0 | Status: SHIPPED | OUTPATIENT
Start: 2020-01-03 | End: 2021-06-10 | Stop reason: ALTCHOICE

## 2020-01-03 NOTE — TELEPHONE ENCOUNTER
Huddled with fabian BURT to send it separately.    Pending Prescriptions:                       Disp   Refills    losartan (COZAAR) 100 MG tablet           90 tab*0            Sig: Take 1 tablet (100 mg) by mouth daily    hydrochlorothiazide (HYDRODIURIL) 12.5 MG*90 tab*0            Sig: Take 1 tablet (12.5 mg) by mouth daily      Nasima Gill RN, BSN

## 2020-01-03 NOTE — TELEPHONE ENCOUNTER
Prescription approved per [facility] Refill Protocol.  Narendra Mantilla, RN, BSN

## 2020-02-10 NOTE — PROGRESS NOTES
SUBJECTIVE:   CC: Deborah E Weil is an 54 year old woman who presents for preventive health visit.     Healthy Habits:     Getting at least 3 servings of Calcium per day:  Yes    Bi-annual eye exam:  Yes    Dental care twice a year:  NO    Sleep apnea or symptoms of sleep apnea:  None    Diet:  Regular (no restrictions) and Low salt    Frequency of exercise:  None    Taking medications regularly:  Yes    Medication side effects:  Not applicable    PHQ-2 Total Score: 0    Additional concerns today:  No    She is not doing any formal exercise on a regular basis and she and her  are planning on working on weight loss efforts together. She relates her diet is healthy and since her ED visit she has been avoiding more popcorn and nuts.     She reports that the mole on her left breast has become more crusty, but otherwise is not bothersome or tender.     Denies bowel changes, vaginal bleeding, vaginal itching, urinary issues, sleep issues, headaches, chest pain, or shortness of breath. She reports having a bowel movement daily.       Today's PHQ-2 Score:   PHQ-2 ( 1999 Pfizer) 2/16/2020   Q1: Little interest or pleasure in doing things 0   Q2: Feeling down, depressed or hopeless 0   PHQ-2 Score 0   Q1: Little interest or pleasure in doing things Not at all   Q2: Feeling down, depressed or hopeless Not at all   PHQ-2 Score 0       Abuse: Current or Past(Physical, Sexual or Emotional)- No  Do you feel safe in your environment? Yes        Social History     Tobacco Use     Smoking status: Never Smoker     Smokeless tobacco: Never Used   Substance Use Topics     Alcohol use: Yes     Comment: Occ at social events, 4 drinks a month         Alcohol Use 2/16/2020   Prescreen: >3 drinks/day or >7 drinks/week? No   Prescreen: >3 drinks/day or >7 drinks/week? -       Reviewed orders with patient.  Reviewed health maintenance and updated orders accordingly - Yes  BP Readings from Last 3 Encounters:   02/17/20 124/84    11/25/19 118/84   11/10/19 (!) 176/114    Wt Readings from Last 3 Encounters:   02/17/20 95.9 kg (211 lb 8 oz)   11/10/19 94.3 kg (207 lb 12.8 oz)   11/04/19 95.3 kg (210 lb 3.2 oz)                  Patient Active Problem List   Diagnosis     CARDIOVASCULAR SCREENING; LDL GOAL LESS THAN 160     Hypertension goal BP (blood pressure) < 140/90     Menopause     Past Surgical History:   Procedure Laterality Date     CHOLECYSTECTOMY, LAPOROSCOPIC  2004    Cholecystectomy, Laparoscopic     COLONOSCOPY WITH CO2 INSUFFLATION N/A 1/17/2017    Procedure: COLONOSCOPY WITH CO2 INSUFFLATION;  Surgeon: Duane, William Charles, MD;  Location:  OR       Social History     Tobacco Use     Smoking status: Never Smoker     Smokeless tobacco: Never Used   Substance Use Topics     Alcohol use: Yes     Comment: Occ at social events, 4 drinks a month     Family History   Problem Relation Age of Onset     Diabetes Father      C.A.D. Father         MI at 40     Hypertension Father      Diabetes Sister      Diabetes Sister      Colon Cancer No family hx of          Current Outpatient Medications   Medication Sig Dispense Refill     hydrochlorothiazide (HYDRODIURIL) 12.5 MG tablet Take 1 tablet (12.5 mg) by mouth daily 90 tablet 0     Ibuprofen (ADVIL PO)        losartan (COZAAR) 100 MG tablet Take 1 tablet (100 mg) by mouth daily 90 tablet 0     losartan-hydrochlorothiazide (HYZAAR) 100-12.5 MG tablet TAKE 1 TABLET BY MOUTH ONCE DAILY 90 tablet 2     metoprolol succinate ER (TOPROL-XL) 25 MG 24 hr tablet Take 1 tablet (25 mg) by mouth daily 30 tablet 30     Allergies   Allergen Reactions     Lisinopril Cough     Recent Labs   Lab Test 11/10/19  1221 11/04/19  1402 02/18/19  0916  01/25/19  1325 01/23/18  0829  12/08/16  1135  10/21/14  0852   LDL  --   --   --   --  141* 96  --  134*  --  106   HDL  --   --   --   --  65 62  --  64  --  66   TRIG  --   --   --   --  63 76  --  62  --  87   ALT 37 48 38  --  60*  --   --  28   < >  --     CR 0.89 0.78  --    < > 0.86 0.91   < > 0.80   < >  --    GFRESTIMATED 74 86  --    < > 77 65   < > 75   < >  --    GFRESTBLACK 86 >90  --    < > 89 78   < > >90   GFR Calc     < >  --    POTASSIUM 3.8 4.0 3.8   < > 3.3* 3.4   < > 4.0   < >  --    TSH  --   --   --   --   --  1.58  --   --   --  2.46    < > = values in this interval not displayed.        Mammogram Screening: Patient over age 50, mutual decision to screen reflected in health maintenance. Pertinent mammograms are reviewed under the imaging tab.    History of abnormal Pap smear:   Last 3 Pap and HPV Results:   PAP / HPV Latest Ref Rng & Units 2019 10/21/2014   PAP - NIL NIL   HPV 16 DNA NEG:Negative Negative -   HPV 18 DNA NEG:Negative Negative -   OTHER HR HPV NEG:Negative Negative -     PAP / HPV Latest Ref Rng & Units 2019 10/21/2014   PAP - NIL NIL   HPV 16 DNA NEG:Negative Negative -   HPV 18 DNA NEG:Negative Negative -   OTHER HR HPV NEG:Negative Negative -     Reviewed and updated as needed this visit by clinical staff  Tobacco  Allergies  Meds  Problems  Med Hx  Surg Hx  Fam Hx  Soc Hx          Reviewed and updated as needed this visit by Provider  Tobacco  Allergies  Meds  Problems  Med Hx  Surg Hx  Fam Hx        Past Medical History:   Diagnosis Date     Hypertension      No active medical problems       Past Surgical History:   Procedure Laterality Date     CHOLECYSTECTOMY, LAPOROSCOPIC  2004    Cholecystectomy, Laparoscopic     COLONOSCOPY WITH CO2 INSUFFLATION N/A 2017    Procedure: COLONOSCOPY WITH CO2 INSUFFLATION;  Surgeon: Duane, William Charles, MD;  Location: [facility] OR     OB History    Para Term  AB Living   2 2 2 0 0 0   SAB TAB Ectopic Multiple Live Births   0 0 0 0 0      # Outcome Date GA Lbr Triston/2nd Weight Sex Delivery Anes PTL Lv   2 Term            1 Term               Obstetric Comments   .        Review of Systems   Constitutional: Negative for chills and  "fever.   HENT: Negative for congestion and ear pain.    Eyes: Negative for pain.   Respiratory: Negative for cough.    Cardiovascular: Negative for chest pain.   Gastrointestinal: Negative for abdominal pain, constipation, diarrhea and hematochezia.   Breasts:  Negative for breast mass.   Genitourinary: Negative for frequency, genital sores, hematuria, pelvic pain and vaginal discharge.   Neurological: Negative for dizziness and headaches.   Psychiatric/Behavioral: The patient is not nervous/anxious.      This document serves as a record of the services and decisions personally performed and made by Shaniqua Orlando CNP. It was created on his/her behalf by Dorie Colon, trained medical scribe. The creation of this document is based the provider's statements to the medical scribes.    Scribidalia Colon 9:31 AM, February 17, 2020   OBJECTIVE:   /84   Pulse 77   Temp 98.8  F (37.1  C) (Oral)   Resp 16   Ht 1.702 m (5' 7\")   Wt 95.9 kg (211 lb 8 oz)   LMP 09/26/2014 (Exact Date)   SpO2 95%   Breastfeeding No   BMI 33.13 kg/m       Physical Exam  GENERAL APPEARANCE: healthy, alert and no distress  EYES: Eyes grossly normal to inspection, PERRL and conjunctivae and sclerae normal  HENT: ear canals and TM's normal, nose and mouth without ulcers or lesions, oropharynx clear and oral mucous membranes moist  NECK: no adenopathy, no asymmetry, masses, or scars and thyroid normal to palpation  RESP: lungs clear to auscultation - no rales, rhonchi or wheezes  BREAST: normal without masses, tenderness or nipple discharge and no palpable axillary masses or adenopathy  CV: regular rate and rhythm, normal S1 S2, no S3 or S4, no murmur, click or rub, no peripheral edema and peripheral pulses strong  ABDOMEN: soft, nontender, no hepatosplenomegaly, no masses and bowel sounds normal  MS: no musculoskeletal defects are noted and gait is age appropriate without ataxia  SKIN: left lateral breast seborrheic keratosis just " under 1.5 cm with irregular border and rough surfacing.   NEURO: Normal strength and tone, sensory exam grossly normal, mentation intact and speech normal  PSYCH: mentation appears normal and affect normal/bright    Diagnostic Test Results:  Labs reviewed in Epic  Results for orders placed or performed in visit on 02/17/20 (from the past 24 hour(s))   Glucose   Result Value Ref Range    Glucose 104 (H) 70 - 99 mg/dL       ASSESSMENT/PLAN:       ICD-10-CM    1. Routine general medical examination at a health care facility Z00.00 MA SCREENING DIGITAL BILAT - Future  (s+30)     TSH with free T4 reflex     Glucose     Lipid panel reflex to direct LDL Fasting   2. Hypertension goal BP (blood pressure) < 140/90 I10    3. Encounter for screening mammogram for breast cancer Z12.31 MA SCREENING DIGITAL BILAT - Future  (s+30)   4. Skin lesion L98.9      Discussed hypertension, healthy diet, and regular exercise with patient today.      Physical exam completed today. Updated routine fasting screening lab work; will notify with results.     Blood pressure is well controlled within target range. No refills needed at this time as patient has supply on hand.     Digital bilateral screening mammogram referral provided for patient to schedule.     Advised that I appreciate her left breast mole to be benign, but with the recent changes I advised that she follow up with general surgery or dermatology to have it biopsied. Will call general surgery and then will notify patient of information and provide referral. \    Discussed shingrex vaccination with patient and she will follow up with her pharmacy benefit to receive     Follow up with PCP in 9 months for medication management visit with routine lab work or prn.     COUNSELING:  Reviewed preventive health counseling, as reflected in patient instructions       Regular exercise       Healthy diet/nutrition       Immunizations- utd        Safe sex practices/STD prevention- patient  "denies concern for.        Colon cancer screening    Estimated body mass index is 33.13 kg/m  as calculated from the following:    Height as of this encounter: 1.702 m (5' 7\").    Weight as of this encounter: 95.9 kg (211 lb 8 oz).    Weight management plan: Discussed healthy diet and exercise guidelines     reports that she has never smoked. She has never used smokeless tobacco.      Counseling Resources:  ATP IV Guidelines  Pooled Cohorts Equation Calculator  Breast Cancer Risk Calculator  FRAX Risk Assessment  ICSI Preventive Guidelines  Dietary Guidelines for Americans, 2010  USDA's MyPlate  ASA Prophylaxis  Lung CA Screening    The information in this document, created by the medical scribe for me, accurately reflects the services I personally performed and the decisions made by me. I have reviewed and approved this document for accuracy prior to leaving the patient care area.  Shaniqua Orlando CNP  9:31 AM, 02/17/20    [facility]  "

## 2020-02-16 ASSESSMENT — ENCOUNTER SYMPTOMS
BREAST MASS: 0
DIARRHEA: 0
CHILLS: 0
CONSTIPATION: 0
DIZZINESS: 0
HEMATURIA: 0
ABDOMINAL PAIN: 0
HEADACHES: 0
FREQUENCY: 0
EYE PAIN: 0
HEMATOCHEZIA: 0
NERVOUS/ANXIOUS: 0
FEVER: 0
COUGH: 0

## 2020-02-17 ENCOUNTER — OFFICE VISIT (OUTPATIENT)
Dept: FAMILY MEDICINE | Facility: CLINIC | Age: 55
End: 2020-02-17
Payer: COMMERCIAL

## 2020-02-17 VITALS
RESPIRATION RATE: 16 BRPM | HEIGHT: 67 IN | DIASTOLIC BLOOD PRESSURE: 84 MMHG | HEART RATE: 77 BPM | BODY MASS INDEX: 33.19 KG/M2 | TEMPERATURE: 98.8 F | WEIGHT: 211.5 LBS | OXYGEN SATURATION: 95 % | SYSTOLIC BLOOD PRESSURE: 124 MMHG

## 2020-02-17 DIAGNOSIS — Z12.31 ENCOUNTER FOR SCREENING MAMMOGRAM FOR BREAST CANCER: ICD-10-CM

## 2020-02-17 DIAGNOSIS — Z00.00 ROUTINE GENERAL MEDICAL EXAMINATION AT A HEALTH CARE FACILITY: Primary | ICD-10-CM

## 2020-02-17 DIAGNOSIS — L98.9 SKIN LESION: ICD-10-CM

## 2020-02-17 DIAGNOSIS — I10 HYPERTENSION GOAL BP (BLOOD PRESSURE) < 140/90: ICD-10-CM

## 2020-02-17 LAB
CHOLEST SERPL-MCNC: 196 MG/DL
GLUCOSE SERPL-MCNC: 104 MG/DL (ref 70–99)
HDLC SERPL-MCNC: 57 MG/DL
LDLC SERPL CALC-MCNC: 122 MG/DL
NONHDLC SERPL-MCNC: 139 MG/DL
TRIGL SERPL-MCNC: 84 MG/DL
TSH SERPL DL<=0.005 MIU/L-ACNC: 1.78 MU/L (ref 0.4–4)

## 2020-02-17 PROCEDURE — 84443 ASSAY THYROID STIM HORMONE: CPT | Performed by: NURSE PRACTITIONER

## 2020-02-17 PROCEDURE — 99396 PREV VISIT EST AGE 40-64: CPT | Performed by: NURSE PRACTITIONER

## 2020-02-17 PROCEDURE — 80061 LIPID PANEL: CPT | Performed by: NURSE PRACTITIONER

## 2020-02-17 PROCEDURE — 82947 ASSAY GLUCOSE BLOOD QUANT: CPT | Performed by: NURSE PRACTITIONER

## 2020-02-17 PROCEDURE — 36415 COLL VENOUS BLD VENIPUNCTURE: CPT | Performed by: NURSE PRACTITIONER

## 2020-02-17 ASSESSMENT — ENCOUNTER SYMPTOMS
HEMATOCHEZIA: 0
FREQUENCY: 0
DIZZINESS: 0
EYE PAIN: 0
BREAST MASS: 0
ABDOMINAL PAIN: 0
CONSTIPATION: 0
FEVER: 0
DIARRHEA: 0
COUGH: 0
HEMATURIA: 0
CHILLS: 0
HEADACHES: 0
NERVOUS/ANXIOUS: 0

## 2020-02-17 ASSESSMENT — MIFFLIN-ST. JEOR: SCORE: 1591.99

## 2020-02-17 NOTE — Clinical Note
I was wondering if you could consult and remove her skin lesion on her breast at same visit. I  Would do it, but leave breast lesions alone for the most-part. I am 99% certain it is benign seborrheic keratosis, but just being thorough. I promise it will be easy, but if you could have your nurse schedule her, I'd appreciate it. Thanks!Shaniqua

## 2020-03-02 ENCOUNTER — OFFICE VISIT (OUTPATIENT)
Dept: SURGERY | Facility: CLINIC | Age: 55
End: 2020-03-02
Payer: COMMERCIAL

## 2020-03-02 ENCOUNTER — HOSPITAL ENCOUNTER (OUTPATIENT)
Dept: MAMMOGRAPHY | Facility: CLINIC | Age: 55
Discharge: HOME | End: 2020-03-02
Attending: NURSE PRACTITIONER | Admitting: NURSE PRACTITIONER
Payer: COMMERCIAL

## 2020-03-02 VITALS
DIASTOLIC BLOOD PRESSURE: 76 MMHG | SYSTOLIC BLOOD PRESSURE: 112 MMHG | WEIGHT: 212.5 LBS | BODY MASS INDEX: 33.28 KG/M2 | HEART RATE: 76 BPM

## 2020-03-02 DIAGNOSIS — Z12.31 SCREENING MAMMOGRAM, ENCOUNTER FOR: ICD-10-CM

## 2020-03-02 DIAGNOSIS — Z00.00 ROUTINE GENERAL MEDICAL EXAMINATION AT A HEALTH CARE FACILITY: ICD-10-CM

## 2020-03-02 DIAGNOSIS — Z12.31 ENCOUNTER FOR SCREENING MAMMOGRAM FOR BREAST CANCER: ICD-10-CM

## 2020-03-02 DIAGNOSIS — L98.8 SKIN LESION OF BREAST: Primary | ICD-10-CM

## 2020-03-02 PROCEDURE — 77067 SCR MAMMO BI INCL CAD: CPT

## 2020-03-02 PROCEDURE — 99204 OFFICE O/P NEW MOD 45 MIN: CPT | Performed by: SPECIALIST

## 2020-03-02 NOTE — PROGRESS NOTES
Consult requested by Shaniqua Orlando    Reason for consultation skin lesion left breast.      HPI:  Patient is a 55-year-old white female with a longstanding history of a mole on her left breast.  Over the past several months she noticed is been changing and become more crusty.  Denies any bleeding or injury to the area.  Denies any breast masses or family history of breast cancer.  Her last mammogram was a year ago and was normal.  She now presents for valuation of her left breast skin lesion.    Past Medical History:   Diagnosis Date     Hypertension      No active medical problems      Past Surgical History:   Procedure Laterality Date     CHOLECYSTECTOMY, LAPOROSCOPIC  2004    Cholecystectomy, Laparoscopic     COLONOSCOPY WITH CO2 INSUFFLATION N/A 1/17/2017    Procedure: COLONOSCOPY WITH CO2 INSUFFLATION;  Surgeon: Duane, William Charles, MD;  Location: [facility] OR     Current Outpatient Medications   Medication     hydrochlorothiazide (HYDRODIURIL) 12.5 MG tablet     Ibuprofen (ADVIL PO)     losartan (COZAAR) 100 MG tablet     losartan-hydrochlorothiazide (HYZAAR) 100-12.5 MG tablet     metoprolol succinate ER (TOPROL-XL) 25 MG 24 hr tablet     No current facility-administered medications for this visit.         Allergies   Allergen Reactions     Lisinopril Cough     Social History     Socioeconomic History     Marital status:      Spouse name: Not on file     Number of children: 2     Years of education: Not on file     Highest education level: Not on file   Occupational History     Employer: [employer]   Social Needs     Financial resource strain: Not on file     Food insecurity:     Worry: Not on file     Inability: Not on file     Transportation needs:     Medical: Not on file     Non-medical: Not on file   Tobacco Use     Smoking status: Never Smoker     Smokeless tobacco: Never Used   Substance and Sexual Activity     Alcohol use: Yes     Comment: Occ at social events, 4 drinks a month     Drug use: No      Sexual activity: Yes     Partners: Male   Lifestyle     Physical activity:     Days per week: Not on file     Minutes per session: Not on file     Stress: Not on file   Relationships     Social connections:     Talks on phone: Not on file     Gets together: Not on file     Attends Jain service: Not on file     Active member of club or organization: Not on file     Attends meetings of clubs or organizations: Not on file     Relationship status: Not on file     Intimate partner violence:     Fear of current or ex partner: Not on file     Emotionally abused: Not on file     Physically abused: Not on file     Forced sexual activity: Not on file   Other Topics Concern     Parent/sibling w/ CABG, MI or angioplasty before 65F 55M? Yes     Comment: heart attack   Social History Narrative     Not on file     Family History   Problem Relation Age of Onset     Diabetes Father      C.A.D. Father         MI at 40     Hypertension Father      Diabetes Sister      Diabetes Sister      Colon Cancer No family hx of       ROS: 10 point ROS neg other than the symptoms noted above in the HPI.    PE:  B/P: 112/76, T: Data Unavailable, P: 76, R: Data Unavailable  General: well developed, well nourished WF who appears her stated age  HEENT: NC/AT, EOMI, (-)icterus, (-)injection  Neck: Supple, No JVD  Chest: CTA  Left Breast - 0.5x0.5 crusty skin lesion left lat breast.  Regular borders.  Heart: S1, S2, (-)m/r/g  Abd: Soft, non tender, non distended, non tender, no masses  Rectal: No masses  Ext; Warm, no edema  Psych: AAOx3  Neuro: No focal deficits      Impression/plan:  This is a 55-year-old lady with a changing skin lesion on her left breast.  She is also due for her mammogram.  I recommended she do her mammogram first and then plan on removing the lesion after that in case something should show up on the mammogram.  She is in agreement with that plan.  I discussed the options of removal under local versus local with sedation.   Patient wishes removed under local anesthesia in the clinic.  She will be scheduled for after her mammogram.  The procedure, risks, benefits alternatives discussed and she agrees to proceed.    Rick Ambrose MD, FACS

## 2020-03-02 NOTE — LETTER
3/2/2020         RE: Deborah E Weil  [address] [place] [facility] [address]        Dear Colleague,    Thank you for referring your patient, Deborah E Weil, to the [facility]. Please see a copy of my visit note below.    Consult requested by Shaniqua Orlando    Reason for consultation skin lesion left breast.      HPI:  Patient is a 55-year-old white female with a longstanding history of a mole on her left breast.  Over the past several months she noticed is been changing and become more crusty.  Denies any bleeding or injury to the area.  Denies any breast masses or family history of breast cancer.  Her last mammogram was a year ago and was normal.  She now presents for valuation of her left breast skin lesion.    Past Medical History:   Diagnosis Date     Hypertension      No active medical problems      Past Surgical History:   Procedure Laterality Date     CHOLECYSTECTOMY, LAPOROSCOPIC  2004    Cholecystectomy, Laparoscopic     COLONOSCOPY WITH CO2 INSUFFLATION N/A 1/17/2017    Procedure: COLONOSCOPY WITH CO2 INSUFFLATION;  Surgeon: Duane, William Charles, MD;  Location: [facility] OR     Current Outpatient Medications   Medication     hydrochlorothiazide (HYDRODIURIL) 12.5 MG tablet     Ibuprofen (ADVIL PO)     losartan (COZAAR) 100 MG tablet     losartan-hydrochlorothiazide (HYZAAR) 100-12.5 MG tablet     metoprolol succinate ER (TOPROL-XL) 25 MG 24 hr tablet     No current facility-administered medications for this visit.         Allergies   Allergen Reactions     Lisinopril Cough     Social History     Socioeconomic History     Marital status:      Spouse name: Not on file     Number of children: 2     Years of education: Not on file     Highest education level: Not on file   Occupational History     Employer: [employer]   Social Needs     Financial resource strain: Not on file     Food insecurity:     Worry: Not on file     Inability: Not on file     Transportation needs:     Medical: Not on  file     Non-medical: Not on file   Tobacco Use     Smoking status: Never Smoker     Smokeless tobacco: Never Used   Substance and Sexual Activity     Alcohol use: Yes     Comment: Occ at social events, 4 drinks a month     Drug use: No     Sexual activity: Yes     Partners: Male   Lifestyle     Physical activity:     Days per week: Not on file     Minutes per session: Not on file     Stress: Not on file   Relationships     Social connections:     Talks on phone: Not on file     Gets together: Not on file     Attends Rastafari service: Not on file     Active member of club or organization: Not on file     Attends meetings of clubs or organizations: Not on file     Relationship status: Not on file     Intimate partner violence:     Fear of current or ex partner: Not on file     Emotionally abused: Not on file     Physically abused: Not on file     Forced sexual activity: Not on file   Other Topics Concern     Parent/sibling w/ CABG, MI or angioplasty before 65F 55M? Yes     Comment: heart attack   Social History Narrative     Not on file     Family History   Problem Relation Age of Onset     Diabetes Father      C.A.D. Father         MI at 40     Hypertension Father      Diabetes Sister      Diabetes Sister      Colon Cancer No family hx of       ROS: 10 point ROS neg other than the symptoms noted above in the HPI.    PE:  B/P: 112/76, T: Data Unavailable, P: 76, R: Data Unavailable  General: well developed, well nourished WF who appears her stated age  HEENT: NC/AT, EOMI, (-)icterus, (-)injection  Neck: Supple, No JVD  Chest: CTA  Left Breast - 0.5x0.5 crusty skin lesion left lat breast.  Regular borders.  Heart: S1, S2, (-)m/r/g  Abd: Soft, non tender, non distended, non tender, no masses  Rectal: No masses  Ext; Warm, no edema  Psych: AAOx3  Neuro: No focal deficits      Impression/plan:  This is a 55-year-old lady with a changing skin lesion on her left breast.  She is also due for her mammogram.  I recommended  she do her mammogram first and then plan on removing the lesion after that in case something should show up on the mammogram.  She is in agreement with that plan.  I discussed the options of removal under local versus local with sedation.  Patient wishes removed under local anesthesia in the clinic.  She will be scheduled for after her mammogram.  The procedure, risks, benefits alternatives discussed and she agrees to proceed.    Rick Ambrose MD, FACS        Again, thank you for allowing me to participate in the care of your patient.        Sincerely,        Rick Ambrose MD

## 2020-03-16 ENCOUNTER — E-VISIT (OUTPATIENT)
Dept: FAMILY MEDICINE | Facility: CLINIC | Age: 55
End: 2020-03-16
Payer: COMMERCIAL

## 2020-03-16 DIAGNOSIS — Z53.9 ERRONEOUS ENCOUNTER--DISREGARD: Primary | ICD-10-CM

## 2020-03-17 ENCOUNTER — MYC MEDICAL ADVICE (OUTPATIENT)
Dept: FAMILY MEDICINE | Facility: CLINIC | Age: 55
End: 2020-03-17

## 2020-03-17 ENCOUNTER — E-VISIT (OUTPATIENT)
Dept: FAMILY MEDICINE | Facility: CLINIC | Age: 55
End: 2020-03-17
Payer: COMMERCIAL

## 2020-03-17 DIAGNOSIS — J32.9 SINUSITIS, UNSPECIFIED CHRONICITY, UNSPECIFIED LOCATION: Primary | ICD-10-CM

## 2020-03-17 PROCEDURE — 99421 OL DIG E/M SVC 5-10 MIN: CPT | Performed by: NURSE PRACTITIONER

## 2020-03-26 ENCOUNTER — TELEPHONE (OUTPATIENT)
Dept: SURGERY | Facility: CLINIC | Age: 55
End: 2020-03-26

## 2020-03-26 NOTE — TELEPHONE ENCOUNTER
I see that the appt notes have been changed and the patient is still scheduled tomorrow. I LM for patient to call back regarding her appt tomorrow (Friday).    Chani Ferguson RN on 3/26/2020 at 4:29 PM

## 2020-03-26 NOTE — TELEPHONE ENCOUNTER
LM for patient to return call. Upon return call, please reschedule patient's appointment with Halie for 2-3 weeks out because of the coronavirus situation. MD reviewed and gave this recommendation.    Chani Ferguson RN on 3/26/2020 at 9:06 AM

## 2020-09-11 ENCOUNTER — MYC MEDICAL ADVICE (OUTPATIENT)
Dept: FAMILY MEDICINE | Facility: CLINIC | Age: 55
End: 2020-09-11

## 2020-09-11 ENCOUNTER — TELEPHONE (OUTPATIENT)
Dept: FAMILY MEDICINE | Facility: CLINIC | Age: 55
End: 2020-09-11

## 2020-09-11 NOTE — TELEPHONE ENCOUNTER
Patient would like to speak to an RN regarding poss uti- she did a virtual visit online not with us and they put on her medication which she couldn't remember but is going to have that ready when the RN calls back to see what kind of advise you could give her.

## 2020-09-14 NOTE — TELEPHONE ENCOUNTER
LM for patient to return call. Please transfer to any Triage RN.     Will also send MyChart message to patient.     FOR RN ONLY:     See notes below and also mychart messages.     Irish James RN BSN

## 2020-09-16 NOTE — TELEPHONE ENCOUNTER
Attempted patient 4 times.     Mychart message from provider read by patient.     No response.     Closing encounte.r     Irish James RN BSN

## 2020-10-12 DIAGNOSIS — I10 ESSENTIAL HYPERTENSION: ICD-10-CM

## 2020-10-12 RX ORDER — LOSARTAN POTASSIUM AND HYDROCHLOROTHIAZIDE 12.5; 1 MG/1; MG/1
TABLET ORAL
Qty: 90 TABLET | Refills: 0 | Status: SHIPPED | OUTPATIENT
Start: 2020-10-12 | End: 2020-11-17

## 2020-10-12 NOTE — TELEPHONE ENCOUNTER
Pending Prescriptions:                       Disp   Refills    losartan-hydrochlorothiazide (HYZAAR) 100*90 tab*0            Sig: Take 1 tablet by mouth once daily    Medication is being filled for 1 time jonathan refill only due to:  Patient is due for med check     Please call and help schedule.  Thank you!

## 2020-10-12 NOTE — LETTER
[facility]  [address] [address], [address]  [place] [address]  [phone]          October 23, 2020    Deborah E Weil                                                                                                                     [id number] [facility] [address]            Dear Kathia,    We have tried to contact you by phone but have been unable to connect with you. We wanted to let you know that your medications were refilled but we will need to see you for a med check before your next refill is due    If you have any questions please call us at [phone].    Sincerely,     Your [facility] Team

## 2020-10-16 NOTE — TELEPHONE ENCOUNTER
Attempted to reach the patient with the following results:  Left message on voicemail for the patient to call back.   Giovani Leon MA

## 2020-11-13 NOTE — PROGRESS NOTES
"Deborah E Weil is a 55 year old female who is being evaluated via a billable telephone visit.      The patient has been notified of following:     \"This telephone visit will be conducted via a call between you and your physician/provider. We have found that certain health care needs can be provided without the need for a physical exam.  This service lets us provide the care you need with a short phone conversation.  If a prescription is necessary we can send it directly to your pharmacy.  If lab work is needed we can place an order for that and you can then stop by our lab to have the test done at a later time.    Telephone visits are billed at different rates depending on your insurance coverage. During this emergency period, for some insurers they may be billed the same as an in-person visit.  Please reach out to your insurance provider with any questions.    If during the course of the call the physician/provider feels a telephone visit is not appropriate, you will not be charged for this service.\"    Patient has given verbal consent for Telephone visit?  Yes    What phone number would you like to be contacted at? [phone]    How would you like to obtain your AVS? Courtney Green     Deborah E Weil is a 55 year old female who presents via phone visit today for the following health issues:    HPI     Hypertension Follow-up      Do you check your blood pressure regularly outside of the clinic? Yes     Are you following a low salt diet? Yes    Are your blood pressures ever more than 140 on the top number (systolic) OR more   than 90 on the bottom number (diastolic), for example 140/90? No      How many servings of fruits and vegetables do you eat daily?  2-3    On average, how many sweetened beverages do you drink each day (Examples: soda, juice, sweet tea, etc.  Do NOT count diet or artificially sweetened beverages)?   0    How many days per week do you exercise enough to make your heart beat faster? 3 or " less    How many minutes a day do you exercise enough to make your heart beat faster? 9 or less    How many days per week do you miss taking your medication? 0    Weight changes. Gaining weight.     Hours have changes. 10:30 - 7 pm.     Good water intake.     No exercise outside of work.     Senior living job, high Covid outbreak.     No edema    No CP/SOB.    Headaches- feels stress related.                  Review of Systems   Constitutional, HEENT, cardiovascular, pulmonary, gi and gu systems are negative, except as otherwise noted.       Objective          Vitals:  No vitals were obtained today due to virtual visit.    healthy, alert and no distress  PSYCH: Alert and oriented times 3; coherent speech, normal   rate and volume, able to articulate logical thoughts, able   to abstract reason, no tangential thoughts, no hallucinations   or delusions  Her affect is normal and pleasant  RESP: No cough, no audible wheezing, able to talk in full sentences  Remainder of exam unable to be completed due to telephone visits    Office Visit on 02/17/2020   Component Date Value Ref Range Status     TSH 02/17/2020 1.78  0.40 - 4.00 mU/L Final     Glucose 02/17/2020 104* 70 - 99 mg/dL Final     Cholesterol 02/17/2020 196  <200 mg/dL Final     Triglycerides 02/17/2020 84  <150 mg/dL Final     HDL Cholesterol 02/17/2020 57  >49 mg/dL Final     LDL Cholesterol Calculated 02/17/2020 122* <100 mg/dL Final    Comment: Above desirable:  100-129 mg/dl  Borderline High:  130-159 mg/dL  High:             160-189 mg/dL  Very high:       >189 mg/dl       Non HDL Cholesterol 02/17/2020 139* <130 mg/dL Final    Comment: Above Desirable:  130-159 mg/dl  Borderline high:  160-189 mg/dl  High:             190-219 mg/dl  Very high:       >219 mg/dl               Assessment/Plan:    Assessment & Plan     Kathia was seen today for hypertension.    Diagnoses and all orders for this visit:    Encounter for hepatitis C screening test for low risk  "patient  -     Hepatitis C antibody; Future    Essential hypertension  -     losartan-hydrochlorothiazide (HYZAAR) 100-12.5 MG tablet; Take 1 tablet by mouth daily  -     metoprolol succinate ER (TOPROL-XL) 25 MG 24 hr tablet; Take 1 tablet (25 mg) by mouth daily  -     **Basic metabolic panel FUTURE 2mo; Future         BMI:   Estimated body mass index is 33.28 kg/m  as calculated from the following:    Height as of 2/17/20: 1.702 m (5' 7\").    Weight as of 3/2/20: 96.4 kg (212 lb 8 oz).   Weight management plan: Discussed healthy diet and exercise guidelines         Work on weight loss  Regular exercise  Future labs.   PE in Feb.   Zoster with labs.   Hep C screening.       No follow-ups on file.    CAMILO Glez [facility]    Phone call duration:  9 minutes                "

## 2020-11-17 ENCOUNTER — VIRTUAL VISIT (OUTPATIENT)
Dept: FAMILY MEDICINE | Facility: CLINIC | Age: 55
End: 2020-11-17
Payer: COMMERCIAL

## 2020-11-17 DIAGNOSIS — Z11.59 ENCOUNTER FOR HEPATITIS C SCREENING TEST FOR LOW RISK PATIENT: Primary | ICD-10-CM

## 2020-11-17 DIAGNOSIS — I10 ESSENTIAL HYPERTENSION: ICD-10-CM

## 2020-11-17 PROCEDURE — 99213 OFFICE O/P EST LOW 20 MIN: CPT | Mod: TEL | Performed by: NURSE PRACTITIONER

## 2020-11-17 RX ORDER — LOSARTAN POTASSIUM AND HYDROCHLOROTHIAZIDE 12.5; 1 MG/1; MG/1
1 TABLET ORAL DAILY
Qty: 90 TABLET | Refills: 1 | Status: SHIPPED | OUTPATIENT
Start: 2020-11-17 | End: 2021-07-21

## 2020-11-17 RX ORDER — METOPROLOL SUCCINATE 25 MG/1
25 TABLET, EXTENDED RELEASE ORAL DAILY
Qty: 90 TABLET | Refills: 1 | Status: SHIPPED | OUTPATIENT
Start: 2020-11-17 | End: 2021-05-26

## 2020-11-20 ENCOUNTER — ALLIED HEALTH/NURSE VISIT (OUTPATIENT)
Dept: FAMILY MEDICINE | Facility: CLINIC | Age: 55
End: 2020-11-20
Payer: COMMERCIAL

## 2020-11-20 DIAGNOSIS — I10 ESSENTIAL HYPERTENSION: ICD-10-CM

## 2020-11-20 DIAGNOSIS — Z23 NEED FOR VACCINATION: Primary | ICD-10-CM

## 2020-11-20 DIAGNOSIS — Z11.59 ENCOUNTER FOR HEPATITIS C SCREENING TEST FOR LOW RISK PATIENT: ICD-10-CM

## 2020-11-20 LAB
ANION GAP SERPL CALCULATED.3IONS-SCNC: 5 MMOL/L (ref 3–14)
BUN SERPL-MCNC: 16 MG/DL (ref 7–30)
CALCIUM SERPL-MCNC: 9.2 MG/DL (ref 8.5–10.1)
CHLORIDE SERPL-SCNC: 104 MMOL/L (ref 94–109)
CO2 SERPL-SCNC: 30 MMOL/L (ref 20–32)
CREAT SERPL-MCNC: 0.98 MG/DL (ref 0.52–1.04)
GFR SERPL CREATININE-BSD FRML MDRD: 65 ML/MIN/{1.73_M2}
GLUCOSE SERPL-MCNC: 104 MG/DL (ref 70–99)
HCV AB SERPL QL IA: NONREACTIVE
POTASSIUM SERPL-SCNC: 4.1 MMOL/L (ref 3.4–5.3)
SODIUM SERPL-SCNC: 139 MMOL/L (ref 133–144)

## 2020-11-20 PROCEDURE — 80048 BASIC METABOLIC PNL TOTAL CA: CPT | Performed by: NURSE PRACTITIONER

## 2020-11-20 PROCEDURE — 86803 HEPATITIS C AB TEST: CPT | Performed by: NURSE PRACTITIONER

## 2020-11-20 PROCEDURE — 90471 IMMUNIZATION ADMIN: CPT

## 2020-11-20 PROCEDURE — 90750 HZV VACC RECOMBINANT IM: CPT

## 2020-11-20 PROCEDURE — 36415 COLL VENOUS BLD VENIPUNCTURE: CPT | Performed by: NURSE PRACTITIONER

## 2020-11-20 PROCEDURE — 99207 PR NO CHARGE NURSE ONLY: CPT

## 2020-11-20 NOTE — PROGRESS NOTES
Prior to immunization administration, verified patients identity using patient s name and date of birth. Please see Immunization Activity for additional information.     Screening Questionnaire for Adult Immunization    Are you sick today?   No   Do you have allergies to medications, food, a vaccine component or latex?   No   Have you ever had a serious reaction after receiving a vaccination?   No   Do you have a long-term health problem with heart, lung, kidney, or metabolic disease (e.g., diabetes), asthma, a blood disorder, no spleen, complement component deficiency, a cochlear implant, or a spinal fluid leak?  Are you on long-term aspirin therapy?   No   Do you have cancer, leukemia, HIV/AIDS, or any other immune system problem?   No   Do you have a parent, brother, or sister with an immune system problem?   No   In the past 3 months, have you taken medications that affect  your immune system, such as prednisone, other steroids, or anticancer drugs; drugs for the treatment of rheumatoid arthritis, Crohn s disease, or psoriasis; or have you had radiation treatments?   No   Have you had a seizure, or a brain or other nervous system problem?   No   During the past year, have you received a transfusion of blood or blood    products, or been given immune (gamma) globulin or antiviral drug?   No   For women: Are you pregnant or is there a chance you could become       pregnant during the next month?   No   Have you received any vaccinations in the past 4 weeks?   No     Immunization questionnaire answers were all negative.        Per orders of Dr. Shaniqua Orlando , injection of Zoster #2 given by Jazzmine Solano CMA. Patient instructed to remain in clinic for 15 minutes afterwards, and to report any adverse reaction to me immediately.       Screening performed by Jazzmine Solano CMA on 11/20/2020 at 8:55 AM.

## 2021-04-17 ENCOUNTER — HEALTH MAINTENANCE LETTER (OUTPATIENT)
Age: 56
End: 2021-04-17

## 2021-05-24 DIAGNOSIS — I10 ESSENTIAL HYPERTENSION: ICD-10-CM

## 2021-05-26 RX ORDER — METOPROLOL SUCCINATE 25 MG/1
TABLET, EXTENDED RELEASE ORAL
Qty: 90 TABLET | Refills: 0 | Status: SHIPPED | OUTPATIENT
Start: 2021-05-26 | End: 2021-06-10

## 2021-05-26 NOTE — TELEPHONE ENCOUNTER
Pending Prescriptions:                       Disp   Refills    metoprolol succinate ER (TOPROL-XL) 25 MG*90 tab*0            Sig: TAKE 1 TABLET (25MG) BY MOUTH ONCE DAILY    Medication is being filled for 1 time jonathan refill only due to:  Patient is due for medication follow up    Please call and help schedule.  Thank you!    Annie Meyer RN on 5/26/2021 at 5:29 PM

## 2021-06-06 ENCOUNTER — HEALTH MAINTENANCE LETTER (OUTPATIENT)
Age: 56
End: 2021-06-06

## 2021-06-10 ENCOUNTER — OFFICE VISIT (OUTPATIENT)
Dept: FAMILY MEDICINE | Facility: CLINIC | Age: 56
End: 2021-06-10
Payer: COMMERCIAL

## 2021-06-10 VITALS
WEIGHT: 188 LBS | HEIGHT: 66 IN | RESPIRATION RATE: 17 BRPM | HEART RATE: 67 BPM | BODY MASS INDEX: 30.22 KG/M2 | TEMPERATURE: 98 F | SYSTOLIC BLOOD PRESSURE: 118 MMHG | DIASTOLIC BLOOD PRESSURE: 74 MMHG | OXYGEN SATURATION: 97 %

## 2021-06-10 DIAGNOSIS — Z00.00 ROUTINE MEDICAL EXAM: Primary | ICD-10-CM

## 2021-06-10 DIAGNOSIS — E28.39 ESTROGEN DEFICIENCY: ICD-10-CM

## 2021-06-10 DIAGNOSIS — Z12.31 ENCOUNTER FOR SCREENING MAMMOGRAM FOR BREAST CANCER: ICD-10-CM

## 2021-06-10 DIAGNOSIS — Z23 NEED FOR VACCINATION: ICD-10-CM

## 2021-06-10 DIAGNOSIS — Z71.89 ADVANCED DIRECTIVES, COUNSELING/DISCUSSION: ICD-10-CM

## 2021-06-10 DIAGNOSIS — I10 ESSENTIAL HYPERTENSION: ICD-10-CM

## 2021-06-10 LAB
CHOLEST SERPL-MCNC: 212 MG/DL
HDLC SERPL-MCNC: 56 MG/DL
HGB BLD-MCNC: 12.6 G/DL (ref 11.7–15.7)
LDLC SERPL CALC-MCNC: 141 MG/DL
NONHDLC SERPL-MCNC: 156 MG/DL
TRIGL SERPL-MCNC: 74 MG/DL
TSH SERPL DL<=0.005 MIU/L-ACNC: 2.26 MU/L (ref 0.4–4)

## 2021-06-10 PROCEDURE — 84443 ASSAY THYROID STIM HORMONE: CPT | Performed by: NURSE PRACTITIONER

## 2021-06-10 PROCEDURE — 36415 COLL VENOUS BLD VENIPUNCTURE: CPT | Performed by: NURSE PRACTITIONER

## 2021-06-10 PROCEDURE — 80061 LIPID PANEL: CPT | Performed by: NURSE PRACTITIONER

## 2021-06-10 PROCEDURE — 90471 IMMUNIZATION ADMIN: CPT | Performed by: NURSE PRACTITIONER

## 2021-06-10 PROCEDURE — 90750 HZV VACC RECOMBINANT IM: CPT | Performed by: NURSE PRACTITIONER

## 2021-06-10 PROCEDURE — 99396 PREV VISIT EST AGE 40-64: CPT | Mod: 25 | Performed by: NURSE PRACTITIONER

## 2021-06-10 PROCEDURE — 85018 HEMOGLOBIN: CPT | Performed by: NURSE PRACTITIONER

## 2021-06-10 ASSESSMENT — ENCOUNTER SYMPTOMS
MYALGIAS: 1
SHORTNESS OF BREATH: 0
PALPITATIONS: 0
WEAKNESS: 0
ABDOMINAL PAIN: 0
HEMATURIA: 0
DYSURIA: 0
PARESTHESIAS: 0
NERVOUS/ANXIOUS: 0
CHILLS: 0
CONSTIPATION: 0
DIZZINESS: 1
DIARRHEA: 0
FREQUENCY: 0
SORE THROAT: 0
EYE PAIN: 0
FEVER: 0
HEMATOCHEZIA: 0
HEADACHES: 0
NAUSEA: 0
COUGH: 0
BREAST MASS: 0

## 2021-06-10 ASSESSMENT — PAIN SCALES - GENERAL: PAINLEVEL: NO PAIN (0)

## 2021-06-10 ASSESSMENT — MIFFLIN-ST. JEOR: SCORE: 1464.51

## 2021-06-10 NOTE — PROGRESS NOTES
SUBJECTIVE:   CC: Deborah E Weil is an 56 year old woman who presents for preventive health visit.       Patient has been advised of split billing requirements and indicates understanding: Yes  HPI    HTN-low bp's.  Has made healthy changes and had significant weight loss.  Patient thinks she may have lower blood pressure with these healthy changes, and would like to hold back on some of her blood pressure medications.      Today's PHQ-2 Score:   PHQ-2 ( 1999 Pfizer) 6/10/2021   Q1: Little interest or pleasure in doing things 0   Q2: Feeling down, depressed or hopeless 0   PHQ-2 Score 0   Q1: Little interest or pleasure in doing things Not at all   Q2: Feeling down, depressed or hopeless Not at all   PHQ-2 Score 0       Abuse: Current or Past (Physical, Sexual or Emotional) - No  Do you feel safe in your environment? Yes    Have you ever done Advance Care Planning? (For example, a Health Directive, POLST, or a discussion with a medical provider or your loved ones about your wishes): No, advance care planning information given to patient to review.  Advanced care planning was discussed at today's visit.  Form given to patient.  Social History     Tobacco Use     Smoking status: Never Smoker     Smokeless tobacco: Never Used   Substance Use Topics     Alcohol use: Yes     Comment: Occ at social events, 4 drinks a month         Alcohol Use 6/10/2021   Prescreen: >3 drinks/day or >7 drinks/week? No   Prescreen: >3 drinks/day or >7 drinks/week? -       Reviewed orders with patient.  Reviewed health maintenance and updated orders accordingly - Yes  Lab work is in process    Breast Cancer Screening:    Breast CA Risk Assessment (FHS-7) 5/31/2021 5/31/2021 5/31/2021   Do you have a family history of breast, colon, or ovarian cancer? No / Unknown No / Unknown No / Unknown         annual screen  Pertinent mammograms are reviewed under the imaging tab.    History of abnormal Pap smear:   NO - age 30-65 PAP every 5 years  "with negative HPV co-testing recommended  Last 3 Pap Results:   PAP (no units)   Date Value   01/25/2019 NIL   10/21/2014 NIL     Last 3 Pap and HPV Results:   PAP / HPV Latest Ref Rng & Units 1/25/2019 10/21/2014   PAP - NIL NIL   HPV 16 DNA NEG:Negative Negative -   HPV 18 DNA NEG:Negative Negative -   OTHER HR HPV NEG:Negative Negative -     PAP / HPV Latest Ref Rng & Units 1/25/2019 10/21/2014   PAP - NIL NIL   HPV 16 DNA NEG:Negative Negative -   HPV 18 DNA NEG:Negative Negative -   OTHER HR HPV NEG:Negative Negative -     Reviewed and updated as needed this visit by clinical staff  Tobacco  Allergies  Meds   Med Hx  Surg Hx  Fam Hx  Soc Hx        Reviewed and updated as needed this visit by Provider                    Review of Systems  CONSTITUTIONAL: NEGATIVE for fever, chills, change in weight  INTEGUMENTARY/SKIN: NEGATIVE for worrisome rashes, moles or lesions  EYES: NEGATIVE for vision changes or irritation  ENT: NEGATIVE for ear, mouth and throat problems  RESP: NEGATIVE for significant cough or SOB  BREAST: NEGATIVE for masses, tenderness or discharge  CV: NEGATIVE for chest pain, palpitations or peripheral edema  GI: NEGATIVE for nausea, abdominal pain, heartburn, or change in bowel habits  : NEGATIVE for unusual urinary or vaginal symptoms. No vaginal bleeding.  MUSCULOSKELETAL: NEGATIVE for significant arthralgias or myalgia  NEURO: NEGATIVE for weakness, dizziness or paresthesias  PSYCHIATRIC: NEGATIVE for changes in mood or affect      OBJECTIVE:   /74 (BP Location: Left arm, Patient Position: Chair, Cuff Size: Adult Regular)   Pulse 67   Temp 98  F (36.7  C) (Temporal)   Resp 17   Ht 1.676 m (5' 6\")   Wt 85.3 kg (188 lb)   LMP 09/26/2014 (Exact Date)   SpO2 97%   Breastfeeding No   BMI 30.34 kg/m    Physical Exam  GENERAL APPEARANCE: healthy, alert and no distress  EYES: Eyes grossly normal to inspection, PERRL and conjunctivae and sclerae normal  HENT: ear canals and TM's " normal, nose and mouth without ulcers or lesions, oropharynx clear and oral mucous membranes moist  NECK: no adenopathy, no asymmetry, masses, or scars and thyroid normal to palpation  RESP: lungs clear to auscultation - no rales, rhonchi or wheezes  BREAST: normal without masses, tenderness or nipple discharge and no palpable axillary masses or adenopathy  CV: regular rate and rhythm, normal S1 S2, no S3 or S4, no murmur, click or rub, no peripheral edema and peripheral pulses strong  ABDOMEN: soft, nontender, no hepatosplenomegaly, no masses and bowel sounds normal  MS: no musculoskeletal defects are noted and gait is age appropriate without ataxia  SKIN: no suspicious lesions or rashes  NEURO: Normal strength and tone, sensory exam grossly normal, mentation intact and speech normal  PSYCH: mentation appears normal and affect normal/bright    Diagnostic Test Results:  Labs reviewed in Epic  Results for orders placed or performed in visit on 06/10/21 (from the past 24 hour(s))   Hemoglobin   Result Value Ref Range    Hemoglobin 12.6 11.7 - 15.7 g/dL       ASSESSMENT/PLAN:       ICD-10-CM    1. Routine medical exam  Z00.00 TSH with free T4 reflex     Hemoglobin     Lipid panel reflex to direct LDL Fasting   2. Essential hypertension  I10    3. Advanced directives, counseling/discussion  Z71.89     info. given   4. Encounter for screening mammogram for breast cancer  Z12.31 *MA Screening Digital Bilateral   5. Need for vaccination  Z23 SHINGRIX [[id number]]       Patient has been advised of split billing requirements and indicates understanding: Yes  COUNSELING:  Reviewed preventive health counseling, as reflected in patient instructions       Regular exercise       Healthy diet/nutrition       Vision screening       Immunizations    shingrix             Osteoporosis prevention/bone health       Colon cancer screening       Advance Care Planning       Stopping Toprol due to controlled BP- home monitor BP in a couple  "weeks or if weight re-gain.     Estimated body mass index is 30.34 kg/m  as calculated from the following:    Height as of this encounter: 1.676 m (5' 6\").    Weight as of this encounter: 85.3 kg (188 lb).    Weight management plan: Discussed healthy diet and exercise guidelines    She reports that she has never smoked. She has never used smokeless tobacco.      Counseling Resources:  ATP IV Guidelines  Pooled Cohorts Equation Calculator  Breast Cancer Risk Calculator  BRCA-Related Cancer Risk Assessment: FHS-7 Tool  FRAX Risk Assessment  ICSI Preventive Guidelines  Dietary Guidelines for Americans, 2010  [employer]'s MyPlate  ASA Prophylaxis  Lung CA Screening    CAMILO Glez CNP  [facility] JOSÉ      Answers for HPI/ROS submitted by the patient on 6/10/2021   Annual Exam:  Frequency of exercise:: 4-5 days/week  Getting at least 3 servings of Calcium per day:: Yes  Diet:: Low salt, Low fat/cholesterol, Carbohydrate counting  Taking medications regularly:: Yes  Medication side effects:: Muscle aches, Lightheadedness  Bi-annual eye exam:: Yes  Dental care twice a year:: NO  Sleep apnea or symptoms of sleep apnea:: None  abdominal pain: No  Blood in stool: No  Blood in urine: No  chest pain: No  chills: No  congestion: No  constipation: No  cough: No  diarrhea: No  dizziness: Yes  ear pain: No  eye pain: No  nervous/anxious: No  fever: No  frequency: No  genital sores: No  headaches: No  mood changes: No  myalgias: Yes  nausea: No  dysuria: No  palpitations: No  Skin sensation changes: No  sore throat: No  urgency: No  rash: No  shortness of breath: No  visual disturbance: No  weakness: No  pelvic pain: No  vaginal bleeding: No  vaginal discharge: No  tenderness: No  breast mass: No  breast discharge: No  Additional concerns today:: No  Duration of exercise:: 15-30 minutes      "

## 2021-06-10 NOTE — RESULT ENCOUNTER NOTE
Evie,  Your labs that have returned are normal. More labs are still processing. Shaniqua Orlando, HUEY

## 2021-07-21 DIAGNOSIS — I10 ESSENTIAL HYPERTENSION: ICD-10-CM

## 2021-07-21 RX ORDER — LOSARTAN POTASSIUM AND HYDROCHLOROTHIAZIDE 12.5; 1 MG/1; MG/1
TABLET ORAL
Qty: 90 TABLET | Refills: 3 | Status: SHIPPED | OUTPATIENT
Start: 2021-07-21 | End: 2022-09-09

## 2021-09-26 ENCOUNTER — HEALTH MAINTENANCE LETTER (OUTPATIENT)
Age: 56
End: 2021-09-26

## 2022-04-14 ENCOUNTER — OFFICE VISIT (OUTPATIENT)
Dept: SURGERY | Facility: CLINIC | Age: 57
End: 2022-04-14
Payer: COMMERCIAL

## 2022-04-14 VITALS
WEIGHT: 184 LBS | BODY MASS INDEX: 29.57 KG/M2 | TEMPERATURE: 95.5 F | SYSTOLIC BLOOD PRESSURE: 130 MMHG | OXYGEN SATURATION: 99 % | RESPIRATION RATE: 18 BRPM | DIASTOLIC BLOOD PRESSURE: 82 MMHG | HEIGHT: 66 IN | HEART RATE: 85 BPM

## 2022-04-14 DIAGNOSIS — L98.8 SKIN LESION OF BREAST: ICD-10-CM

## 2022-04-14 PROCEDURE — 88305 TISSUE EXAM BY PATHOLOGIST: CPT | Performed by: SPECIALIST

## 2022-04-14 PROCEDURE — 11402 EXC TR-EXT B9+MARG 1.1-2 CM: CPT | Mod: 51 | Performed by: SPECIALIST

## 2022-04-14 PROCEDURE — 12032 INTMD RPR S/A/T/EXT 2.6-7.5: CPT | Performed by: SPECIALIST

## 2022-04-14 ASSESSMENT — PAIN SCALES - GENERAL: PAINLEVEL: NO PAIN (0)

## 2022-04-14 NOTE — LETTER
4/14/2022         RE: Deborah E Weil  [address] [address] [place] [address]        Dear Colleague,    Thank you for referring your patient, Deborah E Weil, to the [facility]. Please see a copy of my visit note below.    Follow-up for skin the lesion left breast      Ejective:  Patient is a 57-year-old white female with a longstanding history of a mole on her left breast.  Over the past several months she noticed is been changing and become more crusty.  Denies any bleeding or injury to the area.  Denies any breast masses or family history of breast cancer.  Her last mammogram was was normal.      She was last seen by me in 2020 and was scheduled for an excision under local in the clinic but that was delayed due to COVID.  She now finally has time to return for follow-up.  She thinks it is the same size but has become more crusty.  It also itches.  Has no other new complaints.        Objective:  B/P: 130/82, T: 95.5, P: 85, R: 18  Left Breast - 1x0.5cm crusty skin lesion left lat breast.  Regular borders.            Assessment/plan:  This is a 57-year-old lady with a changing skin lesion on her left breast.  She was last seen by in 2020 and the plan was for an excision under local anesthesia.  It was delayed due to COVID.  She now returns excision.  We will plan for an excision local anesthesia in the clinic.  The procedure, risks, benefits, and alternatives were discussed and the patient agrees to proceed.    Rick Ambrose MD, FACS        Op Note dictated    Rick Ambrose MD, FACs    #[id number]        Again, thank you for allowing me to participate in the care of your patient.        Sincerely,        Rick Ambrose MD

## 2022-04-14 NOTE — PROGRESS NOTES
Follow-up for skin the lesion left breast      Ejective:  Patient is a 57-year-old white female with a longstanding history of a mole on her left breast.  Over the past several months she noticed is been changing and become more crusty.  Denies any bleeding or injury to the area.  Denies any breast masses or family history of breast cancer.  Her last mammogram was was normal.      She was last seen by me in 2020 and was scheduled for an excision under local in the clinic but that was delayed due to COVID.  She now finally has time to return for follow-up.  She thinks it is the same size but has become more crusty.  It also itches.  Has no other new complaints.        Objective:  B/P: 130/82, T: 95.5, P: 85, R: 18  Left Breast - 1x0.5cm crusty skin lesion left lat breast.  Regular borders.            Assessment/plan:  This is a 57-year-old lady with a changing skin lesion on her left breast.  She was last seen by in 2020 and the plan was for an excision under local anesthesia.  It was delayed due to COVID.  She now returns excision.  We will plan for an excision local anesthesia in the clinic.  The procedure, risks, benefits, and alternatives were discussed and the patient agrees to proceed.    Rick Ambrose MD, FACS        Op Note dictated    Rick Ambrose MD, FACs    #[phone]

## 2022-04-14 NOTE — OP NOTE
Procedure Date: 2022    PREOPERATIVE DIAGNOSIS:  Skin lesion, left breast.    POSTOPERATIVE DIAGNOSIS:  Skin lesion, left breast.    PROCEDURE PERFORMED:  Excision of left breast skin lesion with a 3 cm ellipse.    SURGEON:  Rick Ambrose MD, FACS    ANESTHESIA:  Local.    INDICATIONS FOR PROCEDURE:  This is a 57-year-old lady who originally saw me in  for a mole on her left breast that was causing her some irritation, so she opted to have it excised at that time but that was delayed due to COVID.  She then returned today.  Today, the lesion had slightly enlarged by about 0.5 cm and was crusty so it was elected to excise today in the clinic under local.    OPERATIVE FINDINGS:  Included a 1 x 0.5 crusty skin lesion.    DESCRIPTION OF PROCEDURE:  The patient was taken to the procedure room and placed on the table in the supine position.  The area around the lesion of the left breast was prepped and draped in sterile fashion.  A timeout was performed confirming the day and the patient as well as the procedure to be performed.  It was then infiltrated with a local anesthetic, and after adequate analgesia was achieved, a 3 cm ellipse was made encompassing the lesion allowing for 0.5 cm margins.  It was then taken off the subcutaneous tissue using a #15 blade.  Hemostasis was achieved using cautery.  Subcutaneous tissue was reapproximated using 3-0 Vicryl.  The skin was closed using a running 5-0 Monocryl subcuticular stitch.  Exofin glue was applied.  The patient was then sent home with instructions to follow up in 1 week for wound check.    Rick Ambrose MD, FACS        D: 2022   T: 2022   MT: JENNY    Name:     WEIL, DEBORAH E.  MRN:      [id number]        Account:        [id number]   :      1965           Procedure Date: 2022     Document: [id number]

## 2022-04-18 LAB
PATH REPORT.COMMENTS IMP SPEC: NORMAL
PATH REPORT.COMMENTS IMP SPEC: NORMAL
PATH REPORT.FINAL DX SPEC: NORMAL
PATH REPORT.GROSS SPEC: NORMAL
PATH REPORT.MICROSCOPIC SPEC OTHER STN: NORMAL
PATH REPORT.RELEVANT HX SPEC: NORMAL

## 2022-04-25 ENCOUNTER — OFFICE VISIT (OUTPATIENT)
Dept: SURGERY | Facility: CLINIC | Age: 57
End: 2022-04-25
Payer: COMMERCIAL

## 2022-04-25 VITALS
OXYGEN SATURATION: 99 % | DIASTOLIC BLOOD PRESSURE: 78 MMHG | BODY MASS INDEX: 29.7 KG/M2 | TEMPERATURE: 98.2 F | HEART RATE: 82 BPM | WEIGHT: 184 LBS | SYSTOLIC BLOOD PRESSURE: 118 MMHG

## 2022-04-25 DIAGNOSIS — Z98.890 POST-OPERATIVE STATE: Primary | ICD-10-CM

## 2022-04-25 PROCEDURE — 99024 POSTOP FOLLOW-UP VISIT: CPT | Performed by: SPECIALIST

## 2022-04-25 ASSESSMENT — PAIN SCALES - GENERAL: PAINLEVEL: NO PAIN (0)

## 2022-04-25 NOTE — LETTER
4/25/2022         RE: Deborah E Weil  [address] [address] [address]        Dear Colleague,    Thank you for referring your patient, Deborah E Weil, to the [facility]. Please see a copy of my visit note below.    Follow-up for excision of left breast skin lesion    Subjective:  Patient feels good no complaints no concerns    Objective:  B/P: 118/78, T: 98.2, P: 82, R: Data Unavailable  Chest: Left breast incision healing well    Path: Support keratosis, inflamed, no evidence of malignancy    Assessment/plan:  This is a 57 delay status post excision of a left breast skin lesion.  It was benign.  Doing well.  She will follow-up with me as necessary.    Rick Ambrose MD, FACS      Again, thank you for allowing me to participate in the care of your patient.        Sincerely,        Rick Ambrose MD

## 2022-07-03 ENCOUNTER — HEALTH MAINTENANCE LETTER (OUTPATIENT)
Age: 57
End: 2022-07-03

## 2022-08-28 ENCOUNTER — HEALTH MAINTENANCE LETTER (OUTPATIENT)
Age: 57
End: 2022-08-28

## 2022-09-09 ENCOUNTER — OFFICE VISIT (OUTPATIENT)
Dept: FAMILY MEDICINE | Facility: OTHER | Age: 57
End: 2022-09-09
Payer: COMMERCIAL

## 2022-09-09 VITALS
HEART RATE: 70 BPM | TEMPERATURE: 98.4 F | WEIGHT: 184 LBS | OXYGEN SATURATION: 98 % | HEIGHT: 66 IN | SYSTOLIC BLOOD PRESSURE: 120 MMHG | BODY MASS INDEX: 29.57 KG/M2 | DIASTOLIC BLOOD PRESSURE: 70 MMHG

## 2022-09-09 DIAGNOSIS — K21.9 GASTROESOPHAGEAL REFLUX DISEASE, UNSPECIFIED WHETHER ESOPHAGITIS PRESENT: ICD-10-CM

## 2022-09-09 DIAGNOSIS — I10 ESSENTIAL HYPERTENSION: ICD-10-CM

## 2022-09-09 DIAGNOSIS — Z00.00 ROUTINE GENERAL MEDICAL EXAMINATION AT A HEALTH CARE FACILITY: Primary | ICD-10-CM

## 2022-09-09 DIAGNOSIS — E78.5 HYPERLIPIDEMIA LDL GOAL <160: ICD-10-CM

## 2022-09-09 DIAGNOSIS — Z23 FLU VACCINE NEED: ICD-10-CM

## 2022-09-09 DIAGNOSIS — Z12.31 ENCOUNTER FOR SCREENING MAMMOGRAM FOR BREAST CANCER: ICD-10-CM

## 2022-09-09 LAB
ANION GAP SERPL CALCULATED.3IONS-SCNC: 6 MMOL/L (ref 3–14)
BUN SERPL-MCNC: 15 MG/DL (ref 7–30)
CALCIUM SERPL-MCNC: 9.3 MG/DL (ref 8.5–10.1)
CHLORIDE BLD-SCNC: 108 MMOL/L (ref 94–109)
CHOLEST SERPL-MCNC: 217 MG/DL
CO2 SERPL-SCNC: 27 MMOL/L (ref 20–32)
CREAT SERPL-MCNC: 0.79 MG/DL (ref 0.52–1.04)
FASTING STATUS PATIENT QL REPORTED: YES
GFR SERPL CREATININE-BSD FRML MDRD: 87 ML/MIN/1.73M2
GLUCOSE BLD-MCNC: 98 MG/DL (ref 70–99)
HDLC SERPL-MCNC: 60 MG/DL
LDLC SERPL CALC-MCNC: 135 MG/DL
NONHDLC SERPL-MCNC: 157 MG/DL
POTASSIUM BLD-SCNC: 3.7 MMOL/L (ref 3.4–5.3)
SODIUM SERPL-SCNC: 141 MMOL/L (ref 133–144)
TRIGL SERPL-MCNC: 108 MG/DL

## 2022-09-09 PROCEDURE — 90471 IMMUNIZATION ADMIN: CPT | Performed by: NURSE PRACTITIONER

## 2022-09-09 PROCEDURE — 90686 IIV4 VACC NO PRSV 0.5 ML IM: CPT | Performed by: NURSE PRACTITIONER

## 2022-09-09 PROCEDURE — 99214 OFFICE O/P EST MOD 30 MIN: CPT | Mod: 25 | Performed by: NURSE PRACTITIONER

## 2022-09-09 PROCEDURE — 99396 PREV VISIT EST AGE 40-64: CPT | Mod: 25 | Performed by: NURSE PRACTITIONER

## 2022-09-09 PROCEDURE — 36415 COLL VENOUS BLD VENIPUNCTURE: CPT | Performed by: NURSE PRACTITIONER

## 2022-09-09 PROCEDURE — 80048 BASIC METABOLIC PNL TOTAL CA: CPT | Performed by: NURSE PRACTITIONER

## 2022-09-09 PROCEDURE — 80061 LIPID PANEL: CPT | Performed by: NURSE PRACTITIONER

## 2022-09-09 RX ORDER — LOSARTAN POTASSIUM AND HYDROCHLOROTHIAZIDE 12.5; 1 MG/1; MG/1
1 TABLET ORAL DAILY
Qty: 90 TABLET | Refills: 3 | Status: SHIPPED | OUTPATIENT
Start: 2022-09-09 | End: 2023-10-04

## 2022-09-09 ASSESSMENT — ENCOUNTER SYMPTOMS
EYE PAIN: 0
PALPITATIONS: 0
MYALGIAS: 0
HEMATOCHEZIA: 0
FREQUENCY: 0
PARESTHESIAS: 0
NERVOUS/ANXIOUS: 0
WEAKNESS: 0
HEMATURIA: 0
HEARTBURN: 0
COUGH: 0
CONSTIPATION: 0
JOINT SWELLING: 0
NAUSEA: 0
FEVER: 0
CHILLS: 0
SHORTNESS OF BREATH: 0
HEADACHES: 0
ARTHRALGIAS: 0
DYSURIA: 0
DIZZINESS: 0
DIARRHEA: 0
ABDOMINAL PAIN: 0
SORE THROAT: 0
BREAST MASS: 0

## 2022-09-09 ASSESSMENT — PAIN SCALES - GENERAL: PAINLEVEL: NO PAIN (0)

## 2022-09-09 NOTE — PROGRESS NOTES
SUBJECTIVE:   CC: Deborah E Weil is an 57 year old woman who presents for preventive health visit.       Patient has been advised of split billing requirements and indicates understanding: Yes  Healthy Habits:     Getting at least 3 servings of Calcium per day:  Yes    Bi-annual eye exam:  Yes    Dental care twice a year:  NO    Sleep apnea or symptoms of sleep apnea:  None    Diet:  Low salt, Low fat/cholesterol, Carbohydrate counting and Breakfast skipped    Frequency of exercise:  2-3 days/week    Duration of exercise:  30-45 minutes    Taking medications regularly:  Yes    Medication side effects:  None    PHQ-2 Total Score: 0    Additional concerns today:  No          Today's PHQ-2 Score:   PHQ-2 ( 1999 [employer]) 9/9/2022   Q1: Little interest or pleasure in doing things 0   Q2: Feeling down, depressed or hopeless 0   PHQ-2 Score 0   PHQ-2 Total Score (12-17 Years)- Positive if 3 or more points; Administer PHQ-A if positive -   Q1: Little interest or pleasure in doing things Not at all   Q2: Feeling down, depressed or hopeless Not at all   PHQ-2 Score 0       Abuse: Current or Past (Physical, Sexual or Emotional) - No  Do you feel safe in your environment? Yes        Social History     Tobacco Use     Smoking status: Never Smoker     Smokeless tobacco: Never Used   Substance Use Topics     Alcohol use: Yes     Comment: Occ at social events, 4 drinks a month     If you drink alcohol do you typically have >3 drinks per day or >7 drinks per week? No    Alcohol Use 9/9/2022   Prescreen: >3 drinks/day or >7 drinks/week? No   Prescreen: >3 drinks/day or >7 drinks/week? -       Reviewed orders with patient.  Reviewed health maintenance and updated orders accordingly - Yes  Lab work is in process    Breast Cancer Screening:    Breast CA Risk Assessment (FHS-7) 5/31/2021 5/31/2021 5/31/2021   Do you have a family history of breast, colon, or ovarian cancer? No / Unknown No / Unknown No / Unknown       click delete  button to remove this line now  Mammogram Screening: Recommended mammography every 1-2 years with patient discussion and risk factor consideration  Pertinent mammograms are reviewed under the imaging tab.    History of abnormal Pap smear: NO - age 30-65 PAP every 5 years with negative HPV co-testing recommended  PAP / HPV Latest Ref Rng & Units 1/25/2019 10/21/2014   PAP (Historical) - NIL NIL   HPV16 NEG:Negative Negative -   HPV18 NEG:Negative Negative -   HRHPV NEG:Negative Negative -     Reviewed and updated as needed this visit by clinical staff   Tobacco   Meds     Fam Hx  Soc Hx          Reviewed and updated as needed this visit by Provider                   Past Medical History:   Diagnosis Date     Hypertension       Past Surgical History:   Procedure Laterality Date     BIOPSY  April 2022    Mole removed from left breast     CHOLECYSTECTOMY, LAPOROSCOPIC  2004    Cholecystectomy, Laparoscopic     COLONOSCOPY  2019     COLONOSCOPY WITH CO2 INSUFFLATION N/A 01/17/2017    Procedure: COLONOSCOPY WITH CO2 INSUFFLATION;  Surgeon: Duane, William Charles, MD;  Location:  OR       Review of Systems   Constitutional: Negative for chills and fever.   HENT: Negative for congestion, ear pain, hearing loss and sore throat.    Eyes: Negative for pain and visual disturbance.   Respiratory: Negative for cough and shortness of breath.    Cardiovascular: Negative for chest pain, palpitations and peripheral edema.   Gastrointestinal: Negative for abdominal pain, constipation, diarrhea, heartburn, hematochezia and nausea.   Breasts:  Negative for tenderness, breast mass and discharge.   Genitourinary: Negative for dysuria, frequency, genital sores, hematuria, pelvic pain, urgency, vaginal bleeding and vaginal discharge.   Musculoskeletal: Negative for arthralgias, joint swelling and myalgias.   Skin: Negative for rash.   Neurological: Negative for dizziness, weakness, headaches and paresthesias.   Psychiatric/Behavioral:  "Negative for mood changes. The patient is not nervous/anxious.      OBJECTIVE:   /70 (BP Location: Right arm, Patient Position: Sitting, Cuff Size: Adult Regular)   Pulse 70   Temp 98.4  F (36.9  C) (Temporal)   Ht 1.683 m (5' 6.26\")   Wt 83.5 kg (184 lb)   LMP 09/26/2014 (Exact Date)   SpO2 98%   BMI 29.47 kg/m    Physical Exam  GENERAL: healthy, alert and no distress  EYES: Eyes grossly normal to inspection, PERRL and conjunctivae and sclerae normal  HENT: ear canals and TM's normal, nose and mouth without ulcers or lesions  NECK: no adenopathy, no asymmetry, masses, or scars and thyroid normal to palpation  RESP: lungs clear to auscultation - no rales, rhonchi or wheezes  BREAST: normal without masses, tenderness or nipple discharge and no palpable axillary masses or adenopathy  CV: regular rate and rhythm, normal S1 S2, no S3 or S4, no murmur, click or rub, no peripheral edema and peripheral pulses strong  ABDOMEN: soft, nontender, no hepatosplenomegaly, no masses and bowel sounds normal  MS: no gross musculoskeletal defects noted, no edema  SKIN: no suspicious lesions or rashes  NEURO: Normal strength and tone, mentation intact and speech normal  PSYCH: mentation appears normal, affect normal/bright    Diagnostic Test Results:  Labs reviewed in Epic    ASSESSMENT/PLAN:   (Z00.00) Routine general medical examination at a health care facility  (primary encounter diagnosis)  Comment:  Plan: Updated    Will schedule mammogram  Influenza vaccine today     (I10) Essential hypertension  Comment: Stable doing well without side effects.   Plan: Basic metabolic panel  (Ca, Cl, CO2, Creat,         Gluc, K, Na, BUN), losartan-hydrochlorothiazide        (HYZAAR) 100-12.5 MG tablet        Follow up in 1 year     (K21.9) Gastroesophageal reflux disease, unspecified whether esophagitis present  Comment:   Plan: Stable     (Z12.31) Encounter for screening mammogram for breast cancer  Comment:   Plan: MA Screening " "Digital Bilateral        Patient to schedule      (E78.5) Hyperlipidemia LDL goal <160  Comment:   Plan: Lipid panel reflex to direct LDL Fasting        Recheck today  Encouraged diet and exercise.       (Z23) Flu vaccine need  Comment:   Plan: Vaccine given.     Patient has been advised of split billing requirements and indicates understanding: Yes    COUNSELING:  Reviewed preventive health counseling, as reflected in patient instructions    Estimated body mass index is 29.47 kg/m  as calculated from the following:    Height as of this encounter: 1.683 m (5' 6.26\").    Weight as of this encounter: 83.5 kg (184 lb).    Weight management plan: Discussed healthy diet and exercise guidelines    She reports that she has never smoked. She has never used smokeless tobacco.      Counseling Resources:  ATP IV Guidelines  Pooled Cohorts Equation Calculator  Breast Cancer Risk Calculator  BRCA-Related Cancer Risk Assessment: FHS-7 Tool  FRAX Risk Assessment  ICSI Preventive Guidelines  Dietary Guidelines for Americans, 2010  USDA's MyPlate  ASA Prophylaxis  Lung CA Screening    CAMILO Murcia CNP  [facility]  "

## 2022-12-29 ENCOUNTER — E-VISIT (OUTPATIENT)
Dept: FAMILY MEDICINE | Facility: OTHER | Age: 57
End: 2022-12-29
Payer: COMMERCIAL

## 2022-12-29 DIAGNOSIS — J01.00 ACUTE MAXILLARY SINUSITIS, RECURRENCE NOT SPECIFIED: Primary | ICD-10-CM

## 2022-12-29 PROCEDURE — 99421 OL DIG E/M SVC 5-10 MIN: CPT | Performed by: PHYSICIAN ASSISTANT

## 2022-12-29 NOTE — PATIENT INSTRUCTIONS
You may want to try a nasal lavage (also known as nasal irrigation). You can find over-the-counter products, such as Neti-Pot, at retail locations or make your own at home. Instructions for homemade nasal lavage and more information on the process are available online at http://www.aafp.org/afp/2009/1115/p1121.html.    Dear Deborah E Weil    After reviewing your responses, I've been able to diagnose you with Acute maxillary sinusitis, recurrence not specified.      Based on your responses and diagnosis, I have prescribed   Orders Placed This Encounter     amoxicillin-clavulanate (AUGMENTIN) 875-125 MG tablet    to treat your symptoms. I have sent this to your pharmacy.?     It is also important to stay well hydrated, get lots of rest and take over-the-counter decongestants,?tylenol?or ibuprofen if you?are able to?take those medications per your primary care provider to help relieve discomfort.?     It is important that you take?all of?your prescribed medication even if your symptoms are improving after a few doses.? Taking?all of?your medicine helps prevent the symptoms from returning.?     If your symptoms worsen, you develop severe headache, vomiting, high fever (>102), or are not improving in 7 days, please contact your primary care provider for an appointment or visit any of our convenient Walk-in Care or Urgent Care Centers to be seen which can be found on our website?here.?     Thanks again for choosing?us?as your health care partner,?   ?  Wai Da Silva PA-C?

## 2023-01-18 ENCOUNTER — ANCILLARY PROCEDURE (OUTPATIENT)
Dept: MAMMOGRAPHY | Facility: CLINIC | Age: 58
End: 2023-01-18
Attending: NURSE PRACTITIONER
Payer: COMMERCIAL

## 2023-01-18 ENCOUNTER — ANCILLARY ORDERS (OUTPATIENT)
Dept: FAMILY MEDICINE | Facility: OTHER | Age: 58
End: 2023-01-18

## 2023-01-18 DIAGNOSIS — R92.8 ABNORMAL MAMMOGRAM: ICD-10-CM

## 2023-01-18 DIAGNOSIS — Z12.31 ENCOUNTER FOR SCREENING MAMMOGRAM FOR BREAST CANCER: ICD-10-CM

## 2023-01-18 PROCEDURE — 77066 DX MAMMO INCL CAD BI: CPT | Performed by: RADIOLOGY

## 2023-01-18 PROCEDURE — G0279 TOMOSYNTHESIS, MAMMO: HCPCS | Performed by: RADIOLOGY

## 2023-05-01 ENCOUNTER — E-VISIT (OUTPATIENT)
Dept: FAMILY MEDICINE | Facility: OTHER | Age: 58
End: 2023-05-01
Payer: COMMERCIAL

## 2023-05-01 ENCOUNTER — TELEPHONE (OUTPATIENT)
Dept: FAMILY MEDICINE | Facility: OTHER | Age: 58
End: 2023-05-01

## 2023-05-01 DIAGNOSIS — L98.9 SKIN LESION: Primary | ICD-10-CM

## 2023-05-01 PROCEDURE — 99421 OL DIG E/M SVC 5-10 MIN: CPT | Performed by: NURSE PRACTITIONER

## 2023-05-01 RX ORDER — TRIAMCINOLONE ACETONIDE 1 MG/G
OINTMENT TOPICAL 2 TIMES DAILY
Qty: 30 G | Refills: 0 | Status: SHIPPED | OUTPATIENT
Start: 2023-05-01 | End: 2023-10-04

## 2023-05-01 NOTE — TELEPHONE ENCOUNTER
Sent message to do e-visit      CAMILO Murcia CNP  Questions or concerns please feel free to send me a [employer] message or call me  Phone : [phone]    n

## 2023-05-01 NOTE — TELEPHONE ENCOUNTER
Provider E-Visit time total (minutes): 5 minutes      CAMILO Murcia CNP  Questions or concerns please feel free to send me a [employer] message or call me  Phone : [phone]

## 2023-05-03 ENCOUNTER — TELEPHONE (OUTPATIENT)
Dept: DERMATOLOGY | Facility: CLINIC | Age: 58
End: 2023-05-03
Payer: COMMERCIAL

## 2023-05-03 NOTE — TELEPHONE ENCOUNTER
This encounter is being sent to inform the clinic that this patient has a referral from Kathleen Oliver APRN CNP in [facility] for the diagnoses of Skin Lesion; Worsening skin lesion and has requested that this patient be seen within Priority: 1-2 Weeks and/or with Priority: 1-2 Weeks.  Based on the availability of our provider(s), we are unable to accommodate this request.      Were all sites offered this patient?  Yes  Pt is requesting [facility] location only please due to where she lives - Thanks    Does scheduling algorithm request to schedule next available?  Patient appointment has not been scheduled.  Please review the referral request for accommodation and contact the patient.  If unable to accommodate, please resubmit a referral and indicate a preferred partner or affiliate location using Provider Finder or Scheduling Instructions field.      Referral Order in Epic  Records in Epic  No outside Records    Please review and call Pt to schedule    Thanks!

## 2023-05-25 NOTE — PROGRESS NOTES
[facility] Dermatology Note  Encounter Date: May 31, 2023  Office Visit     Dermatology Problem List:  Last skin check 5/31/2023   0. NUB - mid abdomen, right upper chest, left lower back, s/p bx 5/31/2023  1.History of benign biopsies   -left breast s/p bx with PCP - [facility]    Lesion to monitor: right proximal shin- 6mm erosion the medial aspect appears similar to dermatofibroma    ____________________________________________    Assessment & Plan:    # Multiple clinically benign nevi on the trunk and extremities. No treatment is necessary at this time unless the lesion changes or becomes symptomatic.    - ABCDEs of melanoma were discussed and self skin checks were advised.    # Seborrheic keratosis, non irritated on the trunk and extremities. Explained to patient benign nature of lesion. No treatment is necessary at this time unless the lesion changes or becomes symptomatic.     - Monitor for changes.    # Cherry Angiomas - trunk and extremities. Explained to patient benign nature of lesion. No treatment is necessary at this time unless the lesion changes or becomes symptomatic.      # Solar lentigines on the sun exposed skin areas. Benign nature was discussed. No further intervention required at this time.    - Sun precaution was advised including the use of sun screens of SPF 30 or higher, sun protective clothing, and avoidance of tanning beds.      # Neoplasm of unspecified behavior of the skin (D49.2) on the mid abdomen.   - 4mm brown irregular macule. The differential diagnosis includes DN vs MM vs other.  - Photograph obtained.  - See procedure note.    # Neoplasm of unspecified behavior of the skin (D49.2) on the left lower back.   - 4mm brown macule with irregular border. The differential diagnosis includes DN vs MM vs other.  - Photograph obtained.  - See procedure note.    # Neoplasm of unspecified behavior of the skin (D49.2) on the right upper chest.   - 2cm brown and pink patch. The  differential diagnosis includes lentigo maligna vs lentigo vs other.  - Photograph obtained.  - See procedure note.    # Lesion to monitor-right proximal shin, 6mm erosion the medial aspect appears similar to dermatofibroma  -Monitor on f/u     Procedures Performed:   - Shave biopsy x3 procedure note, location(s): mid abdomen, left lower back, right upper chest. After discussion of benefits and risks including but not limited to bleeding, infection, scar, incomplete removal, recurrence, and non-diagnostic biopsy, written consent and photographs were obtained. The area was cleaned with isopropyl alcohol. 0.5mL of 1% lidocaine with epinephrine was injected to obtain adequate anesthesia of lesion(s). Shave biopsy at site(s) performed. Hemostasis was achieved with aluminium chloride. Petrolatum ointment and a sterile dressing were applied. The patient tolerated the procedure and no complications were noted. The patient was provided with verbal and written post care instructions.       Follow-up: 1 year(s) in-person, or earlier for new or changing lesions    Staff and Scribe:     Scribe Disclosure:   I, Sparkle Wood, am serving as a scribe to document services personally performed by this physician, Meri Wei PA-C, based on data collection and the provider's statements to me.   Provider Disclosure:   The documentation recorded by the scribe accurately reflects the services I personally performed and the decisions made by me.    All risks, benefits and alternatives were discussed with patient.  Patient is in agreement and understands the assessment and plan.  All questions were answered.    Meri Wei PA-C, [facility]  [facility] [place]: Phone: [phone], Fax: [phone]  [facility]: Phone: [phone],  Fax: [phone]  [facility]: Phone: [phone], Fax:  [phone]  ____________________________________________    CC: Skin Check (Patient here for a skin check, areas of concerns right upper chest, right shin itches and bleeds )    HPI:  Ms. Deborah E Weil is a(n) 58 year old female who presents today as a new patient for a skin check. Pt reported having two sun spots that are concerning. Pt reports a spot on the left breast that was biopsied and removed. Pt reported a spot on the upper chest that is red and irritated. Pt reports using a steroid cream twice a day for a week on the spot which improved over time. Pt denies a family hx of skin cancer.     Referred by Kathleen Oliver CNP on 5/1/2023 for a skin lesion.     Patient is otherwise feeling well, without additional skin concerns.    Labs Reviewed:  N/A    Physical Exam:  Vitals: LMP 09/26/2014 (Exact Date)   SKIN: Total skin excluding the undergarment areas was performed. The exam included the head/face, neck, both arms, chest, back, abdomen, both legs, digits and/or nails.   - Otero type II.  - There are dome shaped bright red papules on the trunk and extremities.   - Multiple regular brown pigmented macules and papules are identified on the trunk and extremities, <100.  - Scattered brown macules on sun exposed areas.  - There are waxy stuck on tan to brown papules on the trunk and extremities.    -2cm brown and pink patch on the right upper chest   -4mm brown irregular macule mid abdomen   -4mm brown macule with irregular border-left lower back  - No other lesions of concern on areas examined.                   Medications:  Current Outpatient Medications   Medication     losartan-hydrochlorothiazide (HYZAAR) 100-12.5 MG tablet     triamcinolone (KENALOG) 0.1 % external ointment     No current facility-administered medications for this visit.      Past Medical History:   Patient Active Problem List   Diagnosis     CARDIOVASCULAR SCREENING; LDL GOAL LESS THAN 160     Hypertension goal BP (blood pressure) <  140/90     Menopause     GERD (gastroesophageal reflux disease)     Past Medical History:   Diagnosis Date     Hypertension

## 2023-05-31 ENCOUNTER — OFFICE VISIT (OUTPATIENT)
Dept: DERMATOLOGY | Facility: CLINIC | Age: 58
End: 2023-05-31
Payer: COMMERCIAL

## 2023-05-31 DIAGNOSIS — D18.01 CHERRY ANGIOMA: ICD-10-CM

## 2023-05-31 DIAGNOSIS — D22.9 MULTIPLE BENIGN NEVI: ICD-10-CM

## 2023-05-31 DIAGNOSIS — L82.1 SEBORRHEIC KERATOSIS: ICD-10-CM

## 2023-05-31 DIAGNOSIS — L98.9 SKIN LESION: ICD-10-CM

## 2023-05-31 DIAGNOSIS — L81.4 SOLAR LENTIGO: Primary | ICD-10-CM

## 2023-05-31 DIAGNOSIS — D48.5 NEOPLASM OF UNCERTAIN BEHAVIOR OF SKIN: ICD-10-CM

## 2023-05-31 PROCEDURE — 11102 TANGNTL BX SKIN SINGLE LES: CPT | Performed by: PHYSICIAN ASSISTANT

## 2023-05-31 PROCEDURE — 88341 IMHCHEM/IMCYTCHM EA ADD ANTB: CPT | Performed by: DERMATOLOGY

## 2023-05-31 PROCEDURE — 11103 TANGNTL BX SKIN EA SEP/ADDL: CPT | Performed by: PHYSICIAN ASSISTANT

## 2023-05-31 PROCEDURE — 88342 IMHCHEM/IMCYTCHM 1ST ANTB: CPT | Performed by: DERMATOLOGY

## 2023-05-31 PROCEDURE — 88305 TISSUE EXAM BY PATHOLOGIST: CPT | Performed by: DERMATOLOGY

## 2023-05-31 PROCEDURE — 99203 OFFICE O/P NEW LOW 30 MIN: CPT | Mod: 25 | Performed by: PHYSICIAN ASSISTANT

## 2023-05-31 NOTE — NURSING NOTE
The following medication was given:     MEDICATION:  Lidocaine with epinephrine 1% 1:[id number]  ROUTE: SQ  SITE: see procedure note  DOSE: 1.5ml  LOT #: [id number]  : [employer]  EXPIRATION DATE: 12/31/2024  NDC#: 59834-710-13  Was there drug waste? no  Multi-dose vial: Yes    Charis Dutton  May 31, 2023

## 2023-05-31 NOTE — PATIENT INSTRUCTIONS
Patient Education     Checking for Skin Cancer  You can find cancer early by checking your skin each month. There are 3 kinds of skin cancer. They are melanoma, basal cell carcinoma, and squamous cell carcinoma. Doing monthly skin checks is the best way to find new marks or skin changes. Follow the instructions below for checking your skin.   The ABCDEs of checking moles for melanoma   Check your moles or growths for signs of melanoma using ABCDE:   Asymmetry: the sides of the mole or growth don t match  Border: the edges are ragged, notched, or blurred  Color: the color within the mole or growth varies  Diameter: the mole or growth is larger than 6 mm (size of a pencil eraser)  Evolving: the size, shape, or color of the mole or growth is changing (evolving is not shown in the images below)    Checking for other types of skin cancer  Basal cell carcinoma or squamous cell carcinoma have symptoms such as:     A spot or mole that looks different from all other marks on your skin  Changes in how an area feels, such as itching, tenderness, or pain  Changes in the skin's surface, such as oozing, bleeding, or scaliness  A sore that does not heal  New swelling or redness beyond the border of a mole    Who s at risk?  Anyone can get skin cancer. But you are at greater risk if you have:   Fair skin, light-colored hair, or light-colored eyes  Many moles or abnormal moles on your skin  A history of sunburns from sunlight or tanning beds  A family history of skin cancer  A history of exposure to radiation or chemicals  A weakened immune system  If you have had skin cancer in the past, you are at risk for recurring skin cancer.   How to check your skin  Do your monthly skin checkups in front of a full-length mirror. Check all parts of your body, including your:   Head (ears, face, neck, and scalp)  Torso (front, back, and sides)  Arms (tops, undersides, upper, and lower armpits)  Hands (palms, backs, and fingers, including  under the nails)  Buttocks and genitals  Legs (front, back, and sides)  Feet (tops, soles, toes, including under the nails, and between toes)  If you have a lot of moles, take digital photos of them each month. Make sure to take photos both up close and from a distance. These can help you see if any moles change over time.   Most skin changes are not cancer. But if you see any changes in your skin, call your doctor right away. Only he or she can diagnose a problem. If you have skin cancer, seeing your doctor can be the first step toward getting the treatment that could save your life.   [employer] last reviewed this educational content on 4/1/2019 2000-2020 [employer]. [address] [place] [address], [place] [address]. All rights reserved. This information is not intended as a substitute for professional medical care. Always follow your healthcare professional's instructions.       When should I call my doctor?  If you are worsening or not improving, please, contact us or seek urgent care as noted below.     Who should I call with questions (adults)?  [facility] (adult and pediatric): [phone]  [facility] (adult): [phone]  For urgent needs outside of business hours call the [facility] at [phone] and ask for the dermatology resident on call to be paged  If this is a medical emergency and you are unable to reach an ER, Call [phone]    Who should I call with questions (pediatric)?  [facility]- Pediatric Dermatology  Dr. Chiquita Andrade, Dr. Guzman Rodríguez, Dr. Ligia Carrillo, LAUREN Valdez, Dr. Elizabeth Ross, Dr. Stephanie Hernandez & Dr. Tee Galaivz  Non-urgent nurse triage line; [phone]- Patricia and Brigid ROLLINS Care Coordinatornilam Beyer (/Complex ) [phone]    If you need a prescription refill, please contact your pharmacy. Refills are approved or denied by our  Physicians during normal business hours, Monday through Fridays  Per office policy, refills will not be granted if you have not been seen within the past year (or sooner depending on your child's condition)    Scheduling Information:  Pediatric Appointment Scheduling and Call Center [phone]  Radiology Scheduling- [phone]  Sedation Unit Scheduling- [phone]  [place] Scheduling- General [phone]; Pediatric Dermatology [phone]  Main  Services: [phone]  Maltese: [phone]  [place]: [phone]  Hmong/Citizen of [place]/Sami: [phone]  Preadmission Nursing Department Fax Number: [phone] (Fax all pre-operative paperwork to this number)    For urgent matters arising during evenings, weekends, or holidays that cannot wait for normal business hours please call [phone] and ask for the dermatology resident on call to be paged.                      Wound Care After a Biopsy    What is a skin biopsy?  A skin biopsy allows the doctor to examine a very small piece of tissue under the microscope to determine the diagnosis and the best treatment for the skin condition. A local anesthetic (numbing medicine) is injected with a very small needle into the skin area to be tested. A small piece of skin is taken from the area. Sometimes a suture (stitch) is used.     What are the risks of a skin biopsy?  I will experience scar, bleeding, swelling, pain, crusting and redness. I may experience incomplete removal or recurrence. Risks of this procedure are excessive bleeding, bruising, infection, nerve damage, numbness, thick (hypertrophic or keloidal) scar and non-diagnostic biopsy.    How should I care for my wound for the first 24 hours?  Keep the wound dry and covered for 24 hours  If it bleeds, hold direct pressure on the area for 15 minutes. If bleeding does not stop, call us or go to the emergency room  Avoid strenuous exercise the first 1-2 days or as your doctor instructs you    How  should I care for the wound after 24 hours?  After 24 hours, remove the bandage  You may bathe or shower as normal  If you had a scalp biopsy, you can shampoo as usual and can use shower water to clean the biopsy site daily  Clean the wound once a day with gentle soap and water  Do not scrub, be gentle  Apply white petroleum/Vaseline after cleaning the wound with a cotton swab or a clean finger, and keep the site covered with a Bandaid /bandage. Bandages are not necessary with a scalp biopsy  If you are unable to cover the site with a Bandaid /bandage, re-apply ointment 2-3 times a day to keep the site moist. Moisture will help with healing  Avoid strenuous activity for first 1-2 days  Avoid lakes, rivers, pools, and oceans until the stitches are removed or the site is healed    How do I clean my wound?  Wash hands thoroughly with soap or use hand  before all wound care  Clean the wound with gentle soap and water  Apply white petroleum/Vaseline  to wound after it is clean  Replace the Bandaid /bandage to keep the wound covered for the first few days or as instructed by your doctor  If you had a scalp biopsy, warm shower water to the area on a daily basis should suffice    What should I use to clean my wound?   Cotton-tipped applicators (Qtips )  White petroleum jelly (Vaseline ). Use a clean new container and use Q-tips to apply.  Bandaids  as needed  Gentle soap     How should I care for my wound long term?  Do not get your wound dirty  Keep up with wound care for one week or until the area is healed.  A small scab will form and fall off by itself when the area is completely healed. The area will be red and will become pink in color as it heals. Sun protection is very important for how your scar will turn out. Sunscreen with an SPF 30 or greater is recommended once the area is healed.  You should have some soreness but it should be mild and slowly go away over several days. Talk to your doctor about using  tylenol for pain,    When should I call my doctor?  If you have increased:   Pain or swelling  Pus or drainage (clear or slightly yellow drainage is ok)  Temperature over 100F  Spreading redness or warmth around wound    When will I hear about my results?  The biopsy results can take 2 weeks to come back.  Your results will automatically release to [employer] before your provider has even reviewed them.  The clinic will call you with the results, send you a Remark message, or have you schedule a follow-up clinic or phone time to discuss the results.  Contact our clinics if you do not hear from us in 2 weeks.    Who should I call with questions?  [facility]: [phone]  [facility]: [phone]  For urgent needs outside of business hours call the [facility] at [phone] and ask for the dermatology resident on call

## 2023-05-31 NOTE — NURSING NOTE
Deborah E Weil's goals for this visit include:   Chief Complaint   Patient presents with     Skin Check     Patient here for a skin check, areas of concerns right upper chest, right shin itches and bleeds        She requests these members of her care team be copied on today's visit information:     PCP: Shaniqua Orlando    Referring Provider:  CAMILO Castellon CNP  [address] [address]    LMP 09/26/2014 (Exact Date)     Do you need any medication refills at today's visit? No         Charis Dutton EMT

## 2023-05-31 NOTE — LETTER
5/31/2023         RE: Deborah E Weil  [address] [address] [address]        Dear Colleague,    Thank you for referring your patient, Deborah E Weil, to the [facility]. Please see a copy of my visit note below.    [facility] Dermatology Note  Encounter Date: May 31, 2023  Office Visit     Dermatology Problem List:  Last skin check 5/31/2023   0. NUB - mid abdomen, right upper chest, left lower back, s/p bx 5/31/2023  1.History of benign biopsies   -left breast s/p bx with PCP - SK    Lesion to monitor: right proximal shin- 6mm erosion the medial aspect appears similar to dermatofibroma    ____________________________________________    Assessment & Plan:    # Multiple clinically benign nevi on the trunk and extremities. No treatment is necessary at this time unless the lesion changes or becomes symptomatic.    - ABCDEs of melanoma were discussed and self skin checks were advised.    # Seborrheic keratosis, non irritated on the trunk and extremities. Explained to patient benign nature of lesion. No treatment is necessary at this time unless the lesion changes or becomes symptomatic.     - Monitor for changes.    # Cherry Angiomas - trunk and extremities. Explained to patient benign nature of lesion. No treatment is necessary at this time unless the lesion changes or becomes symptomatic.      # Solar lentigines on the sun exposed skin areas. Benign nature was discussed. No further intervention required at this time.    - Sun precaution was advised including the use of sun screens of SPF 30 or higher, sun protective clothing, and avoidance of tanning beds.      # Neoplasm of unspecified behavior of the skin (D49.2) on the mid abdomen.   - 4mm brown irregular macule. The differential diagnosis includes DN vs MM vs other.  - Photograph obtained.  - See procedure note.    # Neoplasm of unspecified behavior of the skin (D49.2) on the left lower back.   - 4mm brown  macule with irregular border. The differential diagnosis includes DN vs MM vs other.  - Photograph obtained.  - See procedure note.    # Neoplasm of unspecified behavior of the skin (D49.2) on the right upper chest.   - 2cm brown and pink patch. The differential diagnosis includes lentigo maligna vs lentigo vs other.  - Photograph obtained.  - See procedure note.    # Lesion to monitor-right proximal shin, 6mm erosion the medial aspect appears similar to dermatofibroma  -Monitor on f/u     Procedures Performed:   - Shave biopsy x3 procedure note, location(s): mid abdomen, left lower back, right upper chest. After discussion of benefits and risks including but not limited to bleeding, infection, scar, incomplete removal, recurrence, and non-diagnostic biopsy, written consent and photographs were obtained. The area was cleaned with isopropyl alcohol. 0.5mL of 1% lidocaine with epinephrine was injected to obtain adequate anesthesia of lesion(s). Shave biopsy at site(s) performed. Hemostasis was achieved with aluminium chloride. Petrolatum ointment and a sterile dressing were applied. The patient tolerated the procedure and no complications were noted. The patient was provided with verbal and written post care instructions.       Follow-up: 1 year(s) in-person, or earlier for new or changing lesions    Staff and Scribe:     Scribe Disclosure:   I, Sparkle Wood, am serving as a scribe to document services personally performed by this physician, Meri Wei PA-C, based on data collection and the provider's statements to me.   Provider Disclosure:   The documentation recorded by the scribe accurately reflects the services I personally performed and the decisions made by me.    All risks, benefits and alternatives were discussed with patient.  Patient is in agreement and understands the assessment and plan.  All questions were answered.    Meri Wei PA-C, MPAS  [facility] [place]  [facility]: Phone: [phone], Fax: [phone]  [facility]: Phone: [phone],  Fax: [phone]  [facility]: Phone: [phone], Fax: [phone]  ____________________________________________    CC: Skin Check (Patient here for a skin check, areas of concerns right upper chest, right shin itches and bleeds )    HPI:  Ms. Deborah E Weil is a(n) 58 year old female who presents today as a new patient for a skin check. Pt reported having two sun spots that are concerning. Pt reports a spot on the left breast that was biopsied and removed. Pt reported a spot on the upper chest that is red and irritated. Pt reports using a steroid cream twice a day for a week on the spot which improved over time. Pt denies a family hx of skin cancer.     Referred by Kathleen Oliver CNP on 5/1/2023 for a skin lesion.     Patient is otherwise feeling well, without additional skin concerns.    Labs Reviewed:  N/A    Physical Exam:  Vitals: LMP 09/26/2014 (Exact Date)   SKIN: Total skin excluding the undergarment areas was performed. The exam included the head/face, neck, both arms, chest, back, abdomen, both legs, digits and/or nails.   - Otero type II.  - There are dome shaped bright red papules on the trunk and extremities.   - Multiple regular brown pigmented macules and papules are identified on the trunk and extremities, <100.  - Scattered brown macules on sun exposed areas.  - There are waxy stuck on tan to brown papules on the trunk and extremities.    -2cm brown and pink patch on the right upper chest   -4mm brown irregular macule mid abdomen   -4mm brown macule with irregular border-left lower back  - No other lesions of concern on areas examined.                   Medications:  Current Outpatient Medications   Medication     losartan-hydrochlorothiazide (HYZAAR) 100-12.5 MG tablet     triamcinolone (KENALOG) 0.1 % external ointment     No current  facility-administered medications for this visit.      Past Medical History:   Patient Active Problem List   Diagnosis     CARDIOVASCULAR SCREENING; LDL GOAL LESS THAN 160     Hypertension goal BP (blood pressure) < 140/90     Menopause     GERD (gastroesophageal reflux disease)     Past Medical History:   Diagnosis Date     Hypertension        Again, thank you for allowing me to participate in the care of your patient.        Sincerely,        Meri Wei PA-C

## 2023-06-07 LAB
PATH REPORT.COMMENTS IMP SPEC: NORMAL
PATH REPORT.FINAL DX SPEC: NORMAL
PATH REPORT.GROSS SPEC: NORMAL
PATH REPORT.MICROSCOPIC SPEC OTHER STN: NORMAL
PATH REPORT.RELEVANT HX SPEC: NORMAL

## 2023-06-08 ENCOUNTER — TELEPHONE (OUTPATIENT)
Dept: FAMILY MEDICINE | Facility: CLINIC | Age: 58
End: 2023-06-08
Payer: COMMERCIAL

## 2023-06-08 NOTE — TELEPHONE ENCOUNTER
"----- Message from Meri Wei PA-C sent at 6/7/2023  5:55 PM CDT -----  She can be seen in EP too, if that is easier schedule-wise    Hi, per pathology this patient needs to come back for a few \"scouting\" biopsies to the site at the chest. Overall it looked benign, but there were a few features in the sample that were more concerning. He therefore recommended she come back to do a few additional samples near the spot in order to get more information. Can we add her on in the next few weeks - just for the biopsies?     Thank you!    We received your biopsy results back. The spot on the abdomen was benign. The spot on the lower back was also benign but we will watch it. The spot on the chest was interesting. The pathologist called me to get a little more information on you, and after speaking with him we deiced that it would be best to have you come back in for a few additional scrapes of the area, which we call \"scouting\" biopsies. The reason for this is because although the majority of the spot on the chest looked benign, there were some features that gave him pause, just not enough where he felt he could give an alternative diagnosis. One of my nurses will call you to set this up. Here is additional information on the spot on your back:     The biopsy of your left lower back showed a *mildly dysplastic (or atypical) nevus. Dysplastic nevi are moles that have atypical cells. Atypical cells are graded as mild, moderate, or severe. Mild and moderate are considered variants of normal. Nothing further needs to be done for this mole as long as brown pigment does not return. These types of moles are a marker for increased risk of melanoma and therefore we recommend next skin check in one year.      Let us know if you have further questions.     "

## 2023-06-09 NOTE — TELEPHONE ENCOUNTER
S/w pt and went over Meri's message below.  Scheduled on Monday June 26th at 1:15 pm.  Pt will call MG to see if can get in sooner.    Mesha GOLD RN  [facility]  [phone]

## 2023-07-19 ENCOUNTER — OFFICE VISIT (OUTPATIENT)
Dept: DERMATOLOGY | Facility: CLINIC | Age: 58
End: 2023-07-19
Payer: COMMERCIAL

## 2023-07-19 DIAGNOSIS — D48.9 NEOPLASM OF UNCERTAIN BEHAVIOR: ICD-10-CM

## 2023-07-19 PROCEDURE — 88341 IMHCHEM/IMCYTCHM EA ADD ANTB: CPT | Performed by: DERMATOLOGY

## 2023-07-19 PROCEDURE — 11104 PUNCH BX SKIN SINGLE LESION: CPT | Performed by: PHYSICIAN ASSISTANT

## 2023-07-19 PROCEDURE — 88342 IMHCHEM/IMCYTCHM 1ST ANTB: CPT | Performed by: DERMATOLOGY

## 2023-07-19 PROCEDURE — 88305 TISSUE EXAM BY PATHOLOGIST: CPT | Performed by: DERMATOLOGY

## 2023-07-19 NOTE — PROGRESS NOTES
[facility] Dermatology Note  Encounter Date: Jul 19, 2023  Office Visit      Dermatology Problem List:  Last skin check 5/31/2023   0. 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 Dermatopathology requests additional scouting biopsies of this lesion.  - scouting biopsies today of the site - x3 labeled A, B and C - punch - see below  - Photograph was obtained for clinical monitoring and inclusion in medical record.    Procedures Performed:   - Punch biopsy x3 procedure note, location(s): R upper chest site A, B &C. After discussion of benefits and risks including but not limited to bleeding, infection, scar, incomplete removal, recurrence, and non-diagnostic biopsy, written consent and photographs were obtained. The area was cleaned with isopropyl alcohol. 0.5mL of 1% lidocaine with epinephrine was injected to obtain adequate anesthesia and a 3mm punch biopsy was performed at site(s). 5-0 Prolene sutures were utilized to approximate the epidermal edges. White petrolatum ointment and a bandage was applied to the wound. Explicit verbal and written wound care instructions were provided. The patient left the dermatology clinic in good condition.    Follow-up: pending path results    Staff:     All risks, benefits and alternatives were discussed with patient.  Patient is in agreement and understands the assessment and plan.  All questions were answered.    Meri Wei PA-C, MPAS  [facility]  [facility]: Phone: [phone], Fax: [phone]  [facility]: Phone: [phone],  Fax: [phone]  [facility]: Phone: [phone], Fax: [phone]  ____________________________________________    CC: Biopsy (Patient here for a biopsy on the chest also look at leg. Leg is healed )      HPI:  Ms. Deborah E Weil is a 58 year old female who presents today as a return patient for scouting biopsies of a site on the R upper chest. This site was previously biopsied on 5/31/23 and shown to be patchy lichenoid inflammation with adjacent mildly atypical junctional melanocytic hyperplasia. Per derm path, a request was made for additional sampling near by this site.    Patient is otherwise feeling well, without additional concerns.    Labs:  none    Physical Exam:  Vitals: LMP 09/26/2014 (Exact Date)   SKIN: Focused examination of chest and face was performed.    - R upper chest - well healed bx site with residual tan macule surrounding it  - No other lesions of concern on areas examined.           Medications:  Current Outpatient Medications   Medication     losartan-hydrochlorothiazide (HYZAAR) 100-12.5 MG tablet     triamcinolone (KENALOG) 0.1 % external ointment     No current facility-administered medications for this visit.      Past Medical/Surgical History:   Patient Active Problem List   Diagnosis     CARDIOVASCULAR SCREENING; LDL GOAL LESS THAN 160     Hypertension goal BP (blood pressure) < 140/90     Menopause     GERD (gastroesophageal reflux disease)     Past Medical History:   Diagnosis Date     Hypertension

## 2023-07-19 NOTE — LETTER
7/19/2023         RE: Deborah E Weil  [address] [facility] [address]        Dear Colleague,    Thank you for referring your patient, Deborah E Weil, to the [facility]. Please see a copy of my visit note below.    [facility] Dermatology Note  Encounter Date: Jul 19, 2023  Office Visit      Dermatology Problem List:  Last skin check 5/31/2023   0. 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 Dermatopathology requests additional scouting biopsies of this lesion.  - scouting biopsies today of the site - x3 labeled A, B and C - punch - see below  - Photograph was obtained for clinical monitoring and inclusion in medical record.    Procedures Performed:   - Punch biopsy x3 procedure note, location(s): R upper chest site A, B &C. After discussion of benefits and risks including but not limited to bleeding, infection, scar, incomplete removal, recurrence, and non-diagnostic biopsy, written consent and photographs were obtained. The area was cleaned with isopropyl alcohol. 0.5mL of 1% lidocaine with epinephrine was injected to obtain adequate anesthesia and a 3mm punch biopsy was performed at site(s). 5-0 Prolene sutures were utilized to approximate the epidermal edges. White petrolatum ointment and a bandage was applied to the wound. Explicit verbal and written wound care instructions were provided. The patient left the dermatology clinic in good condition.    Follow-up:  pending path results    Staff:     All risks, benefits and alternatives were discussed with patient.  Patient is in agreement and understands the assessment and plan.  All questions were answered.    Meri Wei PA-C, MPAS  [facility]: Phone: [phone], Fax: [phone]  [facility]: Phone: [phone],  Fax: [phone]  [facility]: Phone: [phone], Fax: [phone]  ____________________________________________    CC: Biopsy (Patient here for a biopsy on the chest also look at leg. Leg is healed )      HPI:  Ms. Deborah E Weil is a 58 year old female who presents today as a return patient for scouting biopsies of a site on the R upper chest. This site was previously biopsied on 5/31/23 and shown to be patchy lichenoid inflammation with adjacent mildly atypical junctional melanocytic hyperplasia. Per derm path, a request was made for additional sampling near by this site.    Patient is otherwise feeling well, without additional concerns.    Labs:  none    Physical Exam:  Vitals: LMP 09/26/2014 (Exact Date)   SKIN: Focused examination of chest and face was performed.    - R upper chest - well healed bx site with residual tan macule surrounding it  - No other lesions of concern on areas examined.           Medications:  Current Outpatient Medications   Medication     losartan-hydrochlorothiazide (HYZAAR) 100-12.5 MG tablet     triamcinolone (KENALOG) 0.1 % external ointment     No current facility-administered medications for this visit.      Past Medical/Surgical History:   Patient Active Problem List   Diagnosis     CARDIOVASCULAR SCREENING; LDL GOAL LESS THAN 160     Hypertension goal BP (blood pressure) < 140/90     Menopause     GERD (gastroesophageal reflux disease)     Past Medical History:   Diagnosis Date     Hypertension                         Again, thank you for allowing me to  participate in the care of your patient.        Sincerely,        Meri Wei PA-C

## 2023-07-19 NOTE — NURSING NOTE
Deborah E Weil's goals for this visit include:   Chief Complaint   Patient presents with     Biopsy     Patient here for a biopsy on the chest also look at leg. Leg is healed        She requests these members of her care team be copied on today's visit information:     PCP: Shaniqua Orlando    Referring Provider:  No referring provider defined for this encounter.    LMP 09/26/2014 (Exact Date)     Do you need any medication refills at today's visit?       Charis Dutton EMT

## 2023-07-19 NOTE — NURSING NOTE
The following medication was given:     MEDICATION:  Lidocaine with epinephrine 1% 1:[id number]  ROUTE: SQ  SITE: see procedure note  DOSE: 3ml  LOT #: [id number]  : [employer]  EXPIRATION DATE: 12/31/2024  NDC#: 10481-159-38  Was there drug waste? no  Multi-dose vial: Yes    Charis Dutton  July 19, 2023

## 2023-07-25 LAB
PATH REPORT.COMMENTS IMP SPEC: ABNORMAL
PATH REPORT.COMMENTS IMP SPEC: YES
PATH REPORT.FINAL DX SPEC: ABNORMAL
PATH REPORT.GROSS SPEC: ABNORMAL
PATH REPORT.MICROSCOPIC SPEC OTHER STN: ABNORMAL
PATH REPORT.RELEVANT HX SPEC: ABNORMAL

## 2023-08-01 ENCOUNTER — TELEPHONE (OUTPATIENT)
Dept: DERMATOLOGY | Facility: CLINIC | Age: 58
End: 2023-08-01
Payer: COMMERCIAL

## 2023-08-01 NOTE — TELEPHONE ENCOUNTER
Excision/Mohs previsit information                                                    Diagnosis: melanoma in-situ ?  Site(s): Right upper chest    Over the counter Chlorhexidine surgical soap to wash all skin below the belly button twice before surgery should be recommended for the following:  - Surgical sites below the waist  - Immunosuppressed  - Previous surgical site infection  - Anticipated wound care challenges    Medication & Allergy Information                                                      Review and update allergy and medication list.    Do you take the following medications:  Coumadin, Eliquis, Pradaxa, Xarelto:  NO   -If on Coumadin, INR should be checked within 7 days of surgery.  Range should be 3.5 or less or within therapeutic range.    Past Medical History                                                    Do you currently or have you previously had any of the following conditions:    Hepatitis:  NO  HIV/AIDS:  NO  Prolonged bleeding or bleeding disorder:  NO  Pacemaker or Defibrillator:  NO.     History of artificial or heart valve replacement:  NO  Endocarditis (inflammation of the inner lining of the heart's chambers and valves):  NO  Have you ever had a prosthetic joint infection:  NO  Pregnant or Breastfeeding:  NO  Mobility device (wheelchair, transfer difficulty): NO    Important Reminders:                                                      Ok to take all of their medications as prescribed  Patients can eat, no need to be fasting  Patient will not be able to get the site wet for 48 hrs  No submerging wound in standing water (lake, pool, bathtub, hot tub) for 2 weeks  No physical activity for 48 hrs (further restrictions will be discussed by MD at time of visit)    If any positives, send to RN for further review  Tasha Pinon CMA

## 2023-08-01 NOTE — TELEPHONE ENCOUNTER
Component Ref Range & Units  Resulting Agency Flag   Case Report   Surgical Pathology Report                         Case: [id number]                                   Authorizing Provider:  Meri Wei PA-C     Collected:           07/19/2023 10:51 AM           Ordering Location:     [facility]   Received:            07/19/2023 11:57 AM                                  [place]                                                                   Pathologist:           Paul Rios MD                                                         Specimens:   A) - Skin, R upper chest, A                                                                          B) - Skin, R upper chest, B                                                                          C) - Skin, R upper chest, C                                                                Final Diagnosis   A. Right upper chest, A:  - Mild junctional melanocytic hyperplasia, papillary dermal fibrosis and perivascular melanophages - (see comment)     B. Right upper chest, B:  - Atypical junctional melanocytic hyperplasia - (see comment)     C. Right upper chest, C:  - Mild junctional melanocytic hyperplasia, papillary dermal fibrosis and perivascular melanophages - (see comment)   Electronically signed by Paul Rios MD on 7/25/2023 at  9:58 PM   Comment   [facility]    Clinical photos and chart history including the prior biopsy results were reviewed with this case.  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  Re-excision is recommended to ensure complete removal.   Clinical Information   [facility]    The patient is a 58 year old female.    Gross Description   [facility]    A(1). Skin, R  "upper chest, A:  The specimen is received in formalin with proper patient identification, labeled \"right upper chest, A\" and the specimen consists of a single, unoriented skin punch biopsy measuring 0.2 cm in diameter and is excised to a depth of 0.3 cm.  The skin surface displays an ill-defined 0.2 x 0.1 cm flat lesion.  The resection margin is inked.  It is left intact and entirely submitted in cassette A1.  B(2). Skin, R upper chest, B:  The specimen is received in formalin with proper patient identification, labeled \"right upper chest, B\" and the specimen consists of a single, unoriented skin punch biopsy measuring 0.3 cm in diameter and is excised to a depth of 0.3 cm.  The skin surface displays no grossly appreciable lesions.  The resection margin is inked.  It is left intact and entirely submitted in cassette B1.  C(3). Skin, R upper chest, C:  The specimen is received in formalin with proper patient identification, labeled \"right upper chest, C\" and the specimen consists of a single, unoriented skin punch biopsy measuring 0.3 cm in diameter and is excised to a depth of 0.2 cm.  The skin surface displays no grossly appreciable lesions.  The resection margin is inked it is left intact and entirely submitted in cassette C1.   Microscopic Description   [facility]    A.  The specimen exhibits mild epidermal hyperplasia with increased basal keratinocyte pigmentation and mild junctional melanocytic hyperplasia, but no definite atypical junctional melanocytic proliferation on multiple level sections or on Melan-A immunostain, above solar elastosis, papillary dermal fibrosis and a sparse perivascular lymphocytic infiltrate with melanophages.     B. The specimen exhibits a subtle junctional melanocytic proliferation which is predominantly single celled with rare small nests, with mild and moderate cytologic atypia and architectural disorder, highlighted by Melan-A immunostain, above papillary dermal fibrosis, perivascular " melanophages and solar elastosis.  PRAME labels a minority of junctional atypical melanocytes.       C. The specimen exhibits mild epidermal hyperplasia with increased basal keratinocyte pigmentation and mild junctional melanocytic hyperplasia, but no definite atypical junctional melanocytic proliferation on multiple level sections or on Melan-A immunostain, above solar elastosis, papillary dermal fibrosis and a sparse perivascular lymphocytic infiltrate with melanophages.   MCRS N/A Yes [facility]  Abnormal    Performing Labs   [facility]    The technical component of this testing was completed at [facility]              Specimen Collected: 07/19/23 10:51 AM Last Resulted: 07/25/23  9:58 PM                     Result Care Coordination      Result Notes     Tasha Pinon CMA  8/1/2023 11:37 AM CDT Back to Top      Patient returned call to clinic to schedule Mohs.  She is scheduled for a telephone consult on 8/9/2023 and Mohs on 8/16/2023. Pre-procedure checklist is complete.    Bess Moore RN  7/31/2023 10:01 AM CDT       Left VM for patient to call the clinic.    Marni Estes RN  7/28/2023  4:28 PM CDT       Writer called pt, no answer. Left message for pt to return our call at [phone].        Marni Estes RN on 7/28/2023 at 4:28 PM    Wilder Dailey MD  7/28/2023  3:55 PM CDT       The 3 specimens came from the same lesion. My treatment recommendation for this spot is Mohs. Ideally can schedule consult and procedure within 6 weeks. Thank you.    Meri Wei PA-C  7/27/2023  5:24 PM CDT       This patient will need an excision vs MMS for a lentigo maligna on the chest - please refer to derm surg               Niko Case. I am glad we re biopsied that spot on the chest. Turns out that there was the earliest sign of a thin variant of melanoma in there. It is stage 0. We caught it at the earliest possible stage  and it is contained within the skin and has not began to spread anywhere else. You will need to have another surgery over this area to ensure we remove all of the pigment plus a margin. I will refer you to my dermatology surgeons for this. One of my nurses will reach out to you soon to schedule that and help answer any questions you may have. As far as skin exams go, we will need to see you back for a full body skin check in about 6mo.     No need to lose sleep over this, we will get it taken care of promptly!     Let me know if you need anything     Meri Wei PA-C  Dermatology   Written by Meri Wei PA-C on 7/27/2023  5:24 PM CDT  Seen by patient Evie E Weil on 7/27/2023  7:31 PM

## 2023-08-09 ENCOUNTER — VIRTUAL VISIT (OUTPATIENT)
Dept: DERMATOLOGY | Facility: CLINIC | Age: 58
End: 2023-08-09
Payer: COMMERCIAL

## 2023-08-09 DIAGNOSIS — D03.59 MELANOMA IN SITU OF OTHER PART OF TRUNK (H): Primary | ICD-10-CM

## 2023-08-09 PROCEDURE — 99212 OFFICE O/P EST SF 10 MIN: CPT | Mod: 93 | Performed by: DERMATOLOGY

## 2023-08-09 ASSESSMENT — PAIN SCALES - GENERAL: PAINLEVEL: NO PAIN (0)

## 2023-08-09 NOTE — PATIENT INSTRUCTIONS
[facility] Dermatology Visit    Thank you for allowing us to participate in your care. Your findings, instructions and follow-up plan are as follows:         When should I call my doctor?  If you are worsening or not improving, please, contact us or seek urgent care as noted below.     Who should I call with questions (adults)?  [facility] (adult and pediatric): [phone]  [facility] (adult): [phone]  For urgent needs outside of business hours call the [facility] at [phone] and ask for the dermatology resident on call  If this is a medical emergency and you are unable to reach an ER, Call 911    Who should I call with questions (pediatric)?  [facility]- Pediatric Dermatology  Dr. Chiquita Andrade, Dr. Guzman Rodríguez, Dr. Ligia Carrillo, Bina Church, PA  Dr. Elizabeth Ross, Dr. Stephanie Hernandez & Dr. Tee Galaviz  Non Urgent  Nurse Triage Line; [phone]- Patricia and Brigid RN Care Coordinators   Kayleen (/Complex ) [phone]    If you need a prescription refill, please contact your pharmacy. Refills are approved or denied by our physicians during normal business hours, Monday through Fridays  Per office policy, refills will not be granted if you have not been seen within the past year (or sooner depending on your child's condition).    Scheduling Information:  Pediatric Appointment Scheduling and Call Center [phone]  Radiology Scheduling- [phone]  Sedation Unit Scheduling- [phone]  [place] Scheduling- General [phone]; Pediatric Dermatology [phone]  Main  Services: [phone]  Citizen of the [place]: [phone]  Polish: [phone]  Hmong/Rudi/Sammarinese: [phone]  Preadmission Nursing Department Fax Number: [phone] (fax all pre-operative paperwork to this number)    For urgent matters arising during evenings, weekends, or  holidays that cannot wait for normal business hours please call [phone] and ask for the dermatology resident on call to be paged.

## 2023-08-09 NOTE — PROGRESS NOTES
[facility] Dermatology Note  Encounter Date: Aug 9, 2023  Store-and-Forward and Telephone ([phone]). Location of teledermatologist: [facility].  Start time: 1111. End time: 1118.    Dermatology Problem List:  Last skin check 5/31/2023     Melanoma in situ, lentiginous type, R upper chest, Mohs scheduled 8/16/23    2.  History of benign biopsies   - left breast s/p bx with PCP - SK  - mid abdomen- bx 5/31/23 intradermal nevus   3. Hx DN   - left lower back, s/p bx 5/31/2023 - mild atypia  ____________________________________________       Assessment & Plan:     # MIS, R upper chest, LM type. Mohs scheduled 8/16/23  The nature, risks, benefits, and alternatives to Mohs surgery were discussed. The patient would like to proceed with Mohs surgery.    Likely repair linear.  She does not take anticoagulants  No indication for preop antibiotics.       Staff:     Wilder Dailey DO    Department of Dermatology  [facility]: Phone: [phone], Fax:[phone]  [facility]: Phone: [phone], Fax: [phone]    ____________________________________________    CC: RECHECK    HPI:  Ms. Deborah E Weil is a(n) 58 year old female who presents today for follow-up  for Mohs surgery consultation for melanoma in situ of the right upper chest.  She does low impact exercise.  She does not take anticoagulants.     Patient is otherwise feeling well, without additional skin concerns.    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 Image date: 7/19/2023.  -Approximately 3  cm poorly defined tan patch right upper chest.   - No other lesions of concern on areas examined.     Medications:  Current Outpatient Medications   Medication    losartan-hydrochlorothiazide (HYZAAR) 100-12.5 MG tablet    triamcinolone (KENALOG) 0.1 % external ointment     No current facility-administered medications for this visit.      Past Medical/Surgical History:   Patient Active Problem List   Diagnosis    CARDIOVASCULAR SCREENING; LDL GOAL LESS THAN 160    Hypertension goal BP (blood pressure) < 140/90    Menopause    GERD (gastroesophageal reflux disease)     Past Medical History:   Diagnosis Date    Hypertension

## 2023-08-09 NOTE — LETTER
8/9/2023         RE: Deborah E Weil  [address] [address] [facility] [address]        Dear Colleague,    Thank you for referring your patient, Deborah E Weil, to the [facility]. Please see a copy of my visit note below.    [facility] Dermatology Note  Encounter Date: Aug 9, 2023  Store-and-Forward and Telephone ([phone]). Location of teledermatologist: [facility].  Start time: 1111. End time: 1118.    Dermatology Problem List:  Last skin check 5/31/2023     Melanoma in situ, lentiginous type, R upper chest, Mohs scheduled 8/16/23    2.  History of benign biopsies   - left breast s/p bx with PCP - SK  - mid abdomen- bx 5/31/23 intradermal nevus   3. Hx DN   - left lower back, s/p bx 5/31/2023 - mild atypia  ____________________________________________       Assessment & Plan:     # MIS, R upper chest, LM type. Mohs scheduled 8/16/23  The nature, risks, benefits, and alternatives to Mohs surgery were discussed. The patient would like to proceed with Mohs surgery.    Likely repair linear.  She does not take anticoagulants  No indication for preop antibiotics.       Staff:     Wilder Dailey DO    Department of Dermatology  [facility]: Phone: [phone], Fax:[phone]  [facility]: Phone: [phone], Fax: [phone]    ____________________________________________    CC: RECHECK    HPI:  Ms. Deborah E Weil is a(n) 58 year old female who presents today for follow-up  for Mohs surgery consultation for melanoma in situ of the right upper chest.  She does low impact exercise.  She does not take anticoagulants.     Patient is otherwise feeling well, without additional skin concerns.    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 Image date: 7/19/2023.  -Approximately 3 cm poorly defined tan patch right upper chest.   - No other lesions of concern on areas examined.     Medications:  Current Outpatient Medications   Medication     losartan-hydrochlorothiazide (HYZAAR) 100-12.5 MG tablet     triamcinolone (KENALOG) 0.1 % external ointment     No current facility-administered medications for this visit.      Past Medical/Surgical History:   Patient Active Problem List   Diagnosis     CARDIOVASCULAR SCREENING; LDL GOAL LESS THAN 160     Hypertension goal BP (blood pressure) < 140/90     Menopause     GERD (gastroesophageal reflux disease)     Past Medical History:   Diagnosis Date     Hypertension          Again, thank you for allowing me to participate in the care of your patient.        Sincerely,        Wilder Dailey MD

## 2023-08-09 NOTE — NURSING NOTE
Teledermatology Nurse Call Patients:     Are you in the [facility] at the time of the encounter? yes    Today's visit will be billed to you and your insurance.    A teledermatology visit is not as thorough as an in-person visit and the quality of the photograph sent may not be of the same quality as that taken by the dermatology clinic.  Chief Complaint   Patient presents with    CHANA Valiente

## 2023-08-10 ENCOUNTER — PATIENT OUTREACH (OUTPATIENT)
Dept: CARE COORDINATION | Facility: CLINIC | Age: 58
End: 2023-08-10
Payer: COMMERCIAL

## 2023-08-16 ENCOUNTER — OFFICE VISIT (OUTPATIENT)
Dept: DERMATOLOGY | Facility: CLINIC | Age: 58
End: 2023-08-16
Payer: COMMERCIAL

## 2023-08-16 VITALS — OXYGEN SATURATION: 93 % | DIASTOLIC BLOOD PRESSURE: 83 MMHG | SYSTOLIC BLOOD PRESSURE: 119 MMHG | HEART RATE: 90 BPM

## 2023-08-16 DIAGNOSIS — D03.59 MELANOMA IN SITU OF TORSO EXCLUDING BREAST (H): ICD-10-CM

## 2023-08-16 PROCEDURE — 88331 PATH CONSLTJ SURG 1 BLK 1SPC: CPT | Mod: 59 | Performed by: DERMATOLOGY

## 2023-08-16 PROCEDURE — 88314 HISTOCHEMICAL STAINS ADD-ON: CPT | Mod: 59 | Performed by: DERMATOLOGY

## 2023-08-16 PROCEDURE — 12034 INTMD RPR S/TR/EXT 7.6-12.5: CPT | Performed by: DERMATOLOGY

## 2023-08-16 PROCEDURE — 88342 IMHCHEM/IMCYTCHM 1ST ANTB: CPT | Mod: XE | Performed by: DERMATOLOGY

## 2023-08-16 PROCEDURE — 88305 TISSUE EXAM BY PATHOLOGIST: CPT | Mod: XE | Performed by: DERMATOLOGY

## 2023-08-16 PROCEDURE — 11102 TANGNTL BX SKIN SINGLE LES: CPT | Mod: 59 | Performed by: DERMATOLOGY

## 2023-08-16 PROCEDURE — 88341 IMHCHEM/IMCYTCHM EA ADD ANTB: CPT | Mod: XE | Performed by: DERMATOLOGY

## 2023-08-16 PROCEDURE — 17313 MOHS 1 STAGE T/A/L: CPT | Performed by: DERMATOLOGY

## 2023-08-16 ASSESSMENT — PAIN SCALES - GENERAL: PAINLEVEL: NO PAIN (0)

## 2023-08-16 NOTE — NURSING NOTE
Deborah E Weil's chief complaint for this visit includes:  Chief Complaint   Patient presents with    Procedure     Mohs- Right Upper chest, melanoma      PCP: Shaniqua Orlando    Referring Provider:  No referring provider defined for this encounter.    /83   Pulse 90   LMP 09/26/2014 (Exact Date)   SpO2 93%   No Pain (0)        Allergies   Allergen Reactions    Lisinopril Cough         Do you need any medication refills at today's visit?  No.       Yolanda Beck LPN

## 2023-08-16 NOTE — NURSING NOTE
The following medication was given:     MEDICATION:  Lidocaine with epinephrine 1% 1:[id number]  ROUTE: SQ  SITE: see procedure note  DOSE: 12 ml  LOT #: [id number]  : [employer]  EXPIRATION DATE: 01/31/2025  NDC#: 38663-758-39  MEDICATION:  Bupivacaine 0.5%  ROUTE: SQ  SITE: see procedure note  DOSE: 3 ml  LOT #: [id number]  : [employer]  EXPIRATION DATE: 11/01/2023  NDC#: 5395-3523-75  Was there drug waste? no  Multi-dose vial: Yes    Mastisol, Steri-Strips, Tegaderm and pressure dressing applied to Mohs site on right upper chest.  Wound care instructions reviewed with patient and AVS provided.  Patient verbalized understanding.  Patient will follow up for suture removal: N/A.  No further questions or concerns at this time.      Tasha Pinon CMA  August 16, 2023

## 2023-08-16 NOTE — PATIENT INSTRUCTIONS
Excision Wound Care Instructions with Steri-Strips    Possible complications of any surgical procedure are bleeding, infection, scarring, alteration in skin color and sensation, muscle weakness in the area, wound dehiscence or seperation, or recurrence of the lesion or disease. On occasion, after healing, a secondary procedure or revision may be recommended in order to obtain the best cosmetic or functional result.     After your surgery, a pressure bandage will be placed over the area that has sutures. This will help prevent bleeding. Please, follow these instructions as they will help you to prevent complications as your wound heals.    For the First 48 hours After Surgery:    Leave the pressure bandage on and keep it dry. If it should come loose, you may retape it, but do not take it off.    Relax and take it easy. Do not do any vigorous exercise, heavy lifting, or bending forward. This could cause the wound to bleed.    Post-operative pain is usually mild. You may alternate between 1000 mg of Tylenol (acetaminophen) and 400 mg of Ibuprofen every 4 hours.  Do not take more than 4,000 mg of acetaminophen and more than 3200 mg of Ibuprofen in a 24 hr period.  Avoid alcohol and vitamin E as these may increase your tendency to bleed.    You may put an ice pack around the bandaged area for 20 minutes every 2-3 hours. This may help reduce swelling, bruising, and pain. Make sure the ice pack is waterproof so that the pressure bandage does not get wet.     If your bandage is saturated with blood apply firm pressure over the bandage with a washcloth for 15 minutes. If bleeding continues after applying pressure for 15 minutes then go to the nearest emergency room.      48 Hours After Surgery:    Remove outer white bandage down to clear plastic film (Tegaderm).  You may notice dark brown, dried blood under the Tegaderm.  This is normal.    Leave the clear plastic film (Tegaderm) on for up to 2 weeks, as long as it is  intact.  If it falls off prior to 2 weeks follow daily wound care below.  If it stays intact for the full 2 weeks, then remove and treat as normal, healthy skin.      Daily Wound Care (if Tegaderm and Steri-Strips fall off prior to 2 weeks):    Wash wound with a mild soap and water.  Use caution when washing the wound, be gentle and do not let the forceful shower stream hit the wound directly. DO NOT WASH WITH HYDROGEN PEROXIDE AS THIS MIGHT CAUSE THE STITCHES TO DISSOLVE FASTER THAN WHAT WE WANT.    Pat dry.    Apply Vaseline (from a new container or tube) over the suture line with a Q-tip until it is completely healed. It is very important to keep the wound continuously moist, as wounds heal best in a moist environment.    Keep the site covered until it's healed.  You can cover it with a Telfa (non-stick) dressing and tape or a band-aid until healed with normal, healthy skin.      Call Us If:    You have pain that is not controlled with Tylenol/Ibuprofen.    You have signs or symptoms of an infection, such as: fever over 100 degrees F, redness, warmth, or foul-smelling or yellow/creamy drainage from the wound.      Who should I call with questions?  [facility]: [phone]  [facility]: [phone]  For urgent needs outside of business hours call the [facility] [[email or web address]] at [phone] and ask for the dermatology resident on call

## 2023-08-16 NOTE — LETTER
8/16/2023         RE: Deborah E Weil  [address] [address] [facility] [address]        Dear Colleague,    Thank you for referring your patient, Deborah E Weil, to the [facility]. Please see a copy of my visit note below.    MOHS MICROGRAPHIC SURGERY REPORT   Aug 16, 2023    Wide Local Excision for Primary Cutaneous Melanoma - Excision 1 (Chest)  Operation performed with curative intent Yes   Original Breslow thickness of the lesion Melanoma in situ (MIS)   Clinical margin width 0.5 cm   Depth of excision Only skin and superficial subcutaneous fat (melanoma in situ)       Surgeon: Wilder Dailey DO    INDICATION:    Preoperative Diagnosis: Melanoma in situ, lentiginous type  Location: right upper chest  Postoperative Diagnosis: Same  Preoperative Lesion size: 3.0cm x 2.5cm    I reviewed treatment options with the patient, including a discussion of wide excision (the gold standard) versus Mohs surgery with MART-1/kerri-A immunostains. After appropriate discussion and informed consent for Mohs surgery and possible repair of the Mohs surgery defect, the patient underwent Mohs surgery as follows:    Biopsy for Negative Kerri-A Control:  An appropriate area (actinic damage similar to surgical site, without obvious melanocytic lesion) was identified (right lateral neck), cleansed and anesthetized. A shave biopsy performed and the skin submitted for processing by frozen section pathology with MART-1 immunostaining in order to show melanocyte density in non-lesional skin.     Debulking and Confirmatory Biopsy/Positive Control:  Tumor was localized, anesthetized and removed by tangential shave excision (not derived by Mohs surgery) for processing by frozen section pathology in order to provide pathological material for subsequent comparison of Mohs sections with MART-1 immunostaining. Processing was also performed to rule out invasive disease given partial sampling.    FROZEN SECTION PATHOLOGY  REPORT:  Gross: 3cm lesion submitted for processing by frozen section pathology  Microscopic: central scarring as exhibited by dermal fibrosis consistent with previous biopsy site as well as residual lentiginous runs of atypical melanocytes showing some nesting as well as pagetoid scatter of melanocytes above basal layer of epidermis  As well as contiguous extension down adnexae without dermal invasion.   Diagnosis: Skin, shave biopsy, right upper chest: Melanocytic hyperplasia    A portion of the specimen was also sent to dermatopatholgy for formalin-fixation and paraffin embedding for archiving of tissue and confirmation of the frozen biopsy diagnosis. The patient then underwent the following surgery.    STAGE I:  The patient was placed on the operating room table.  The area was cleansed with chlorhexidine and infiltrated with 1% Lidocaine and epinephrine. Using a #15-blade, complete excision with 5 mm margin of normal appearing skin was made around the tumor in 1 section.  Hemostasis was obtained by electrodesiccation.  A dressing was placed.  Tissue was divided into 4 tissue blocks that were subsequently mapped, color coded and processed in the Mohs Laboratory with standard H&E staining and MART-1 immunohistochemistry.  Microscopic tumor was not found in any of the tissue blocks.    With the lesion clear of micrographic tumor, surgery was considered complete.  The defect extended to the Fat and measured 4.5 cm x 5.0 cm.      RECONSTRUCTIVE DERMATOLOGIC SURGERY REPORT  We discussed the options for wound management in full with the patient including risks/benefits/possible outcomes.       REPAIR: An intermediate layered linear closure was selected as the procedure which would maximally preserve both function and cosmesis.    After the excision of the tumor, the area was undermined. Hemostasis was obtained with bipolar electrocoagulation.  Closure was oriented so that the wound was in the patient's natural skin  tension lines. The deep subcutaneous and dermal layers were then closed with 3-0 Vicryl and 4-0 monocryl buried vertical mattress sutures. The epidermis was then carefully approximated along the length of the wound using 4-0 monocryl running subcuticular sutures.     Estimated blood loss was less than 10 ml for all surgical sites. A sterile pressure dressing was applied and wound care instructions, with a written handout, were given. The patient was [facility] from the [facility] alert and ambulatory.    Follow-up in [place]    Wilder Dailey DO    Department of Dermatology  [facility]: Phone: [phone], Fax:[phone]  [facility]: Phone: [phone], Fax: [phone]      Again, thank you for allowing me to participate in the care of your patient.        Sincerely,        Wilder Dailey MD

## 2023-08-16 NOTE — PROGRESS NOTES
MOHS MICROGRAPHIC SURGERY REPORT   Aug 16, 2023    Wide Local Excision for Primary Cutaneous Melanoma - Excision 1 (Chest)  Operation performed with curative intent Yes   Original Breslow thickness of the lesion Melanoma in situ (MIS)   Clinical margin width 0.5 cm   Depth of excision Only skin and superficial subcutaneous fat (melanoma in situ)       Surgeon: Wilder Dailey DO    INDICATION:    Preoperative Diagnosis: Melanoma in situ, lentiginous type  Location: right upper chest  Postoperative Diagnosis: Same  Preoperative Lesion size: 3.0cm x 2.5cm    I reviewed treatment options with the patient, including a discussion of wide excision (the gold standard) versus Mohs surgery with MART-1/kerri-A immunostains. After appropriate discussion and informed consent for Mohs surgery and possible repair of the Mohs surgery defect, the patient underwent Mohs surgery as follows:    Biopsy for Negative Kerri-A Control:  An appropriate area (actinic damage similar to surgical site, without obvious melanocytic lesion) was identified (right lateral neck), cleansed and anesthetized. A shave biopsy performed and the skin submitted for processing by frozen section pathology with MART-1 immunostaining in order to show melanocyte density in non-lesional skin.     Debulking and Confirmatory Biopsy/Positive Control:  Tumor was localized, anesthetized and removed by tangential shave excision (not derived by Mohs surgery) for processing by frozen section pathology in order to provide pathological material for subsequent comparison of Mohs sections with MART-1 immunostaining. Processing was also performed to rule out invasive disease given partial sampling.    FROZEN SECTION PATHOLOGY REPORT:  Gross: 3cm lesion submitted for processing by frozen section pathology  Microscopic: central scarring as exhibited by dermal fibrosis consistent with previous biopsy site as well as residual lentiginous runs of atypical melanocytes showing some nesting  as well as pagetoid scatter of melanocytes above basal layer of epidermis  As well as contiguous extension down adnexae without dermal invasion.   Diagnosis: Skin, shave biopsy, right upper chest: Melanocytic hyperplasia    A portion of the specimen was also sent to dermatopatholgy for formalin-fixation and paraffin embedding for archiving of tissue and confirmation of the frozen biopsy diagnosis. The patient then underwent the following surgery.    STAGE I:  The patient was placed on the operating room table.  The area was cleansed with chlorhexidine and infiltrated with 1% Lidocaine and epinephrine. Using a #15-blade, complete excision with 5 mm margin of normal appearing skin was made around the tumor in 1 section.  Hemostasis was obtained by electrodesiccation.  A dressing was placed.  Tissue was divided into 4 tissue blocks that were subsequently mapped, color coded and processed in the Mohs Laboratory with standard H&E staining and MART-1 immunohistochemistry.  Microscopic tumor was not found in any of the tissue blocks.    With the lesion clear of micrographic tumor, surgery was considered complete.  The defect extended to the Fat and measured 4.5 cm x 5.0 cm.      RECONSTRUCTIVE DERMATOLOGIC SURGERY REPORT  We discussed the options for wound management in full with the patient including risks/benefits/possible outcomes.       REPAIR: An intermediate layered linear closure was selected as the procedure which would maximally preserve both function and cosmesis.    After the excision of the tumor, the area was undermined. Hemostasis was obtained with bipolar electrocoagulation.  Closure was oriented so that the wound was in the patient's natural skin tension lines. The deep subcutaneous and dermal layers were then closed with 3-0 Vicryl and 4-0 monocryl buried vertical mattress sutures. The epidermis was then carefully approximated along the length of the wound using 4-0 monocryl running subcuticular sutures.      Estimated blood loss was less than 10 ml for all surgical sites. A sterile pressure dressing was applied and wound care instructions, with a written handout, were given. The patient was discharged from the [facility] alert and ambulatory.    Follow-up in [place] Dailey DO    Department of Dermatology  [facility]: Phone: [phone], Fax:[phone]  [facility]: Phone: [phone], Fax: [phone]

## 2023-08-27 LAB
PATH REPORT.COMMENTS IMP SPEC: ABNORMAL
PATH REPORT.COMMENTS IMP SPEC: ABNORMAL
PATH REPORT.COMMENTS IMP SPEC: YES
PATH REPORT.FINAL DX SPEC: ABNORMAL
PATH REPORT.GROSS SPEC: ABNORMAL
PATH REPORT.MICROSCOPIC SPEC OTHER STN: ABNORMAL
PATH REPORT.RELEVANT HX SPEC: ABNORMAL

## 2023-09-27 ASSESSMENT — ENCOUNTER SYMPTOMS
ABDOMINAL PAIN: 0
DYSURIA: 0
CHILLS: 0
SORE THROAT: 0
FREQUENCY: 0
COUGH: 0
HEMATOCHEZIA: 0
SHORTNESS OF BREATH: 0
NAUSEA: 0
BREAST MASS: 0
PALPITATIONS: 0
NERVOUS/ANXIOUS: 0
HEARTBURN: 1
HEADACHES: 0
HEMATURIA: 0
FEVER: 0
JOINT SWELLING: 0
MYALGIAS: 0
PARESTHESIAS: 0
WEAKNESS: 0
ARTHRALGIAS: 1
DIARRHEA: 0
CONSTIPATION: 0
EYE PAIN: 0
DIZZINESS: 0

## 2023-10-04 ENCOUNTER — OFFICE VISIT (OUTPATIENT)
Dept: FAMILY MEDICINE | Facility: OTHER | Age: 58
End: 2023-10-04
Payer: COMMERCIAL

## 2023-10-04 VITALS
SYSTOLIC BLOOD PRESSURE: 120 MMHG | HEART RATE: 71 BPM | TEMPERATURE: 98.8 F | DIASTOLIC BLOOD PRESSURE: 84 MMHG | OXYGEN SATURATION: 95 % | BODY MASS INDEX: 29.19 KG/M2 | HEIGHT: 67 IN | RESPIRATION RATE: 16 BRPM | WEIGHT: 186 LBS

## 2023-10-04 DIAGNOSIS — Z12.4 CERVICAL CANCER SCREENING: ICD-10-CM

## 2023-10-04 DIAGNOSIS — Z12.31 ENCOUNTER FOR SCREENING MAMMOGRAM FOR BREAST CANCER: ICD-10-CM

## 2023-10-04 DIAGNOSIS — I10 ESSENTIAL HYPERTENSION: ICD-10-CM

## 2023-10-04 DIAGNOSIS — I10 HYPERTENSION GOAL BP (BLOOD PRESSURE) < 140/90: ICD-10-CM

## 2023-10-04 DIAGNOSIS — E78.5 HYPERLIPIDEMIA LDL GOAL <130: ICD-10-CM

## 2023-10-04 DIAGNOSIS — Z00.00 ROUTINE GENERAL MEDICAL EXAMINATION AT A HEALTH CARE FACILITY: Primary | ICD-10-CM

## 2023-10-04 LAB
ALBUMIN SERPL BCG-MCNC: 4.5 G/DL (ref 3.5–5.2)
ALP SERPL-CCNC: 78 U/L (ref 35–104)
ALT SERPL W P-5'-P-CCNC: 19 U/L (ref 0–50)
ANION GAP SERPL CALCULATED.3IONS-SCNC: 13 MMOL/L (ref 7–15)
AST SERPL W P-5'-P-CCNC: 24 U/L (ref 0–45)
BILIRUB SERPL-MCNC: 0.5 MG/DL
BUN SERPL-MCNC: 15.7 MG/DL (ref 6–20)
CALCIUM SERPL-MCNC: 9.6 MG/DL (ref 8.6–10)
CHLORIDE SERPL-SCNC: 102 MMOL/L (ref 98–107)
CHOLEST SERPL-MCNC: 187 MG/DL
CREAT SERPL-MCNC: 0.85 MG/DL (ref 0.51–0.95)
DEPRECATED HCO3 PLAS-SCNC: 26 MMOL/L (ref 22–29)
EGFRCR SERPLBLD CKD-EPI 2021: 79 ML/MIN/1.73M2
GLUCOSE SERPL-MCNC: 98 MG/DL (ref 70–99)
HDLC SERPL-MCNC: 61 MG/DL
LDLC SERPL CALC-MCNC: 110 MG/DL
NONHDLC SERPL-MCNC: 126 MG/DL
POTASSIUM SERPL-SCNC: 3.7 MMOL/L (ref 3.4–5.3)
PROT SERPL-MCNC: 7.3 G/DL (ref 6.4–8.3)
SODIUM SERPL-SCNC: 141 MMOL/L (ref 135–145)
TRIGL SERPL-MCNC: 81 MG/DL

## 2023-10-04 PROCEDURE — 80053 COMPREHEN METABOLIC PANEL: CPT | Performed by: NURSE PRACTITIONER

## 2023-10-04 PROCEDURE — 80061 LIPID PANEL: CPT | Performed by: NURSE PRACTITIONER

## 2023-10-04 PROCEDURE — 36415 COLL VENOUS BLD VENIPUNCTURE: CPT | Performed by: NURSE PRACTITIONER

## 2023-10-04 PROCEDURE — 99213 OFFICE O/P EST LOW 20 MIN: CPT | Mod: 25 | Performed by: NURSE PRACTITIONER

## 2023-10-04 PROCEDURE — 99396 PREV VISIT EST AGE 40-64: CPT | Performed by: NURSE PRACTITIONER

## 2023-10-04 RX ORDER — LOSARTAN POTASSIUM AND HYDROCHLOROTHIAZIDE 12.5; 1 MG/1; MG/1
1 TABLET ORAL DAILY
Qty: 90 TABLET | Refills: 3 | Status: SHIPPED | OUTPATIENT
Start: 2023-10-04

## 2023-10-04 ASSESSMENT — PAIN SCALES - GENERAL: PAINLEVEL: NO PAIN (0)

## 2023-10-04 NOTE — PROGRESS NOTES
SUBJECTIVE:   CC: Evie is an 58 year old who presents for preventive health visit.       10/4/2023     9:13 AM   Additional Questions   Roomed by Becky       Healthy Habits:     Getting at least 3 servings of Calcium per day:  Yes    Bi-annual eye exam:  Yes    Dental care twice a year:  Yes    Sleep apnea or symptoms of sleep apnea:  None    Diet:  Regular (no restrictions)    Frequency of exercise:  4-5 days/week    Duration of exercise:  30-45 minutes    Taking medications regularly:  Yes    Barriers to taking medications:  Not applicable    Medication side effects:  Not applicable    Additional concerns today:  No            Social History     Tobacco Use    Smoking status: Never    Smokeless tobacco: Never   Substance Use Topics    Alcohol use: Yes     Comment: Occ at social events, 4 drinks a month             2023     9:21 AM   Alcohol Use   Prescreen: >3 drinks/day or >7 drinks/week? No     Reviewed orders with patient.  Reviewed health maintenance and updated orders accordingly - Yes  Lab work is in process    Breast Cancer Screenin/31/2021     8:51 AM   Breast CA Risk Assessment (FHS-7)   Do you have a family history of breast, colon, or ovarian cancer? No / Unknown    No / Unknown    No / Unknown       click delete button to remove this line now  Mammogram Screening: Recommended mammography every 1-2 years with patient discussion and risk factor consideration  Pertinent mammograms are reviewed under the imaging tab.    History of abnormal Pap smear: NO - age 30-65 PAP every 5 years with negative HPV co-testing recommended      Latest Ref Rng & Units 2019     1:26 PM 2019     1:19 PM 10/21/2014    12:00 AM   PAP / HPV   PAP (Historical)   NIL  NIL    HPV 16 DNA NEG^Negative Negative      HPV 18 DNA NEG^Negative Negative      Other HR HPV NEG^Negative Negative        Reviewed and updated as needed this visit by clinical staff   Tobacco  Allergies  Meds           "    Reviewed and updated as needed this visit by Provider                 Past Medical History:   Diagnosis Date    Hypertension         Review of Systems     OBJECTIVE:   /84   Pulse 71   Temp 98.8  F (37.1  C) (Temporal)   Resp 16   Ht 1.689 m (5' 6.5\")   Wt 84.4 kg (186 lb)   LMP 09/26/2014 (Exact Date)   SpO2 95%   BMI 29.57 kg/m    Physical Exam  GENERAL: healthy, alert and no distress  EYES: Eyes grossly normal to inspection, PERRL and conjunctivae and sclerae normal  HENT: ear canals and TM's normal, nose and mouth without ulcers or lesions  NECK: no adenopathy, no asymmetry, masses, or scars and thyroid normal to palpation  RESP: lungs clear to auscultation - no rales, rhonchi or wheezes  BREAST: normal without masses, tenderness or nipple discharge and no palpable axillary masses or adenopathy  CV: regular rate and rhythm, normal S1 S2, no S3 or S4, no murmur, click or rub, no peripheral edema and peripheral pulses strong  ABDOMEN: soft, nontender, no hepatosplenomegaly, no masses and bowel sounds normal  MS: no gross musculoskeletal defects noted, no edema  SKIN: no suspicious lesions or rashes  NEURO: Normal strength and tone, mentation intact and speech normal  PSYCH: mentation appears normal, affect normal/bright    Diagnostic Test Results:  Labs reviewed in Epic  Results for orders placed or performed in visit on 10/04/23   Lipid panel reflex to direct LDL Fasting     Status: Abnormal   Result Value Ref Range    Cholesterol 187 <200 mg/dL    Triglycerides 81 <150 mg/dL    Direct Measure HDL 61 >=50 mg/dL    LDL Cholesterol Calculated 110 (H) <=100 mg/dL    Non HDL Cholesterol 126 <130 mg/dL    Narrative    Cholesterol  Desirable:  <200 mg/dL    Triglycerides  Normal:  Less than 150 mg/dL  Borderline High:  150-199 mg/dL  High:  200-499 mg/dL  Very High:  Greater than or equal to 500 mg/dL    Direct Measure HDL  Female:  Greater than or equal to 50 mg/dL   Male:  Greater than or equal to " 40 mg/dL    LDL Cholesterol  Desirable:  <100mg/dL  Above Desirable:  100-129 mg/dL   Borderline High:  130-159 mg/dL   High:  160-189 mg/dL   Very High:  >= 190 mg/dL    Non HDL Cholesterol  Desirable:  130 mg/dL  Above Desirable:  130-159 mg/dL  Borderline High:  160-189 mg/dL  High:  190-219 mg/dL  Very High:  Greater than or equal to 220 mg/dL   Comprehensive metabolic panel (BMP + Alb, Alk Phos, ALT, AST, Total. Bili, TP)     Status: Normal   Result Value Ref Range    Sodium 141 135 - 145 mmol/L    Potassium 3.7 3.4 - 5.3 mmol/L    Carbon Dioxide (CO2) 26 22 - 29 mmol/L    Anion Gap 13 7 - 15 mmol/L    Urea Nitrogen 15.7 6.0 - 20.0 mg/dL    Creatinine 0.85 0.51 - 0.95 mg/dL    GFR Estimate 79 >60 mL/min/1.73m2    Calcium 9.6 8.6 - 10.0 mg/dL    Chloride 102 98 - 107 mmol/L    Glucose 98 70 - 99 mg/dL    Alkaline Phosphatase 78 35 - 104 U/L    AST 24 0 - 45 U/L    ALT 19 0 - 50 U/L    Protein Total 7.3 6.4 - 8.3 g/dL    Albumin 4.5 3.5 - 5.2 g/dL    Bilirubin Total 0.5 <=1.2 mg/dL       ASSESSMENT/PLAN:   (Z00.00) Routine general medical examination at a health care facility  (primary encounter diagnosis)  Comment:   Plan: Updated HM   Getting vaccines at pharmacy today     (I10) Hypertension goal BP (blood pressure) < 140/90  Comment: Stable   Plan: Lipid panel reflex to direct LDL Fasting,         Comprehensive metabolic panel (BMP + Alb, Alk         Phos, ALT, AST, Total. Bili, TP)        Level 3     (E78.5) Hyperlipidemia LDL goal <130  Comment:   Plan: Lipid panel reflex to direct LDL Fasting        Recheck today  The 10-year ASCVD risk score (Lorena APONTE, et al., 2019) is: 2.5%    Values used to calculate the score:      Age: 58 years      Sex: Female      Is Non- : No      Diabetic: No      Tobacco smoker: No      Systolic Blood Pressure: 120 mmHg      Is BP treated: Yes      HDL Cholesterol: 61 mg/dL      Total Cholesterol: 187 mg/dL      (Z12.31) Encounter for screening mammogram  "for breast cancer  Comment:   Plan: MA Screening Digital Bilateral        For future Mammogram     (I10) Essential hypertension  Comment: As noted above   Plan: losartan-hydrochlorothiazide (HYZAAR) 100-12.5         MG tablet        Level 3     (Z12.4) Cervical cancer screening  Comment:   Plan: CANCELED: Pap Screen with HPV - recommended age        30 - 65 years        Return in January for pap.     Patient has been advised of split billing requirements and indicates understanding: Yes      COUNSELING:  Reviewed preventive health counseling, as reflected in patient instructions      BMI:   Estimated body mass index is 29.57 kg/m  as calculated from the following:    Height as of this encounter: 1.689 m (5' 6.5\").    Weight as of this encounter: 84.4 kg (186 lb).   Weight management plan: Discussed healthy diet and exercise guidelines      She reports that she has never smoked. She has never used smokeless tobacco.      CAMILO Murcia CNP  [facility]              "

## 2023-12-19 ENCOUNTER — PATIENT OUTREACH (OUTPATIENT)
Dept: CARE COORDINATION | Facility: CLINIC | Age: 58
End: 2023-12-19
Payer: COMMERCIAL

## 2024-01-16 ENCOUNTER — PATIENT OUTREACH (OUTPATIENT)
Dept: CARE COORDINATION | Facility: CLINIC | Age: 59
End: 2024-01-16
Payer: COMMERCIAL

## 2024-02-15 ENCOUNTER — ANCILLARY PROCEDURE (OUTPATIENT)
Dept: MAMMOGRAPHY | Facility: CLINIC | Age: 59
End: 2024-02-15
Attending: NURSE PRACTITIONER
Payer: COMMERCIAL

## 2024-02-15 PROCEDURE — 77067 SCR MAMMO BI INCL CAD: CPT | Performed by: RADIOLOGY

## 2024-05-23 NOTE — PROGRESS NOTES
"[facility] Dermatology Note  Encounter Date: May 30, 2024  Office Visit     Reviewed patients past medical history and pertinent chart review prior to patients visit today.     Dermatology Problem List:  Last skin check 5/31/2023     0. NUB right upper back and left posterior upper arm, shave biopsy 05/30/24 .     Melanoma in situ, lentiginous type, R upper chest, Mohs scheduled 8/16/23  2.  History of benign biopsies   - left breast s/p bx with PCP - SK  - mid abdomen- bx 5/31/23 intradermal nevus, repigmentation noted within biopsy site on 5/30/2024  3. Hx DN   - left lower back, s/p bx 5/31/2023 - mild atypia  Family Hx: no hx  ____________________________________________    Assessment & Plan:     # Neoplasm of uncertain behavior:  right upper back  DDx includes NMSC vs other. Shave biopsy today.  # Neoplasm of uncertain behavior:  left posterior upper arm  DDx includes MM vs Dysplastic nevus. Shave biopsy today.    Procedure Note: Biopsy by shave technique  The risks and benefits of the procedure were described to the patient. These include but are not limited to bleeding, infection, scar, incomplete removal, and non-diagnostic biopsy. Verbal informed consent was obtained. The above site(s) was cleansed with an alcohol pad and injected with 1% lidocaine with epinephrine. Once anesthesia was obtained, a biopsy(ies) was performed with Gilette blade. The tissue(s) was placed in a labeled container(s) with formalin and sent to pathology. Hemostasis was achieved with aluminum chloride. Vaseline and a bandage were applied to the wound(s). The patient tolerated the procedure well and was given post biopsy care instructions.    # repigmenting nevus within previous biopsy scar, mid abdomen   -repigmentation is with in biopsy scar. Biopsy from area on 5/31/23 resulted as \"predominantly intradermal melanoncytic nevus\". Given this and the fact that repigmentation is within biopsy scar, can continue with " observation. Will recheck at next skin check.    # Personal history of malignant melanoma  # Multiple nevi, trunk and extremities  # Solar lentigines  - No signs of recurrence. Continued observation recommended.   - Nevi demonstrate no concerning features on dermoscopy. We discussed the importance of self exams at home.   - ABCDEs: Counseled ABCDEs of melanoma: Asymmetry, Border (irregularity), Color (not uniform, changes in color), Diameter (greater than 6 mm which is about the size of a pencil eraser), and Evolving (any changes in preexisting moles).  - Sun protection: Counseled SPF 30+ sunscreen, UPF clothing, sun avoidance, tanning bed avoidance.    # Cherry angiomas  # Seborrheic keratoses  - We discussed the benign nature of the skin lesions. No treatment required. Continued observation recommended. Follow up with any concerns.      Follow-up:  6 months for follow up full body skin exam, as needed for new or changing lesions or new concerns    All risks, benefits and alternatives were discussed with patient.  Patient is in agreement and understands the assessment and plan.  All questions were answered.  Sierra Nelson PA-C  [facility]at[facility]    ____________________________________________    CC: No chief complaint on file.    HPI:  Ms. Deborah E Weil is a(n) 59 year old female who presents today as a return patient for a full body skin cancer screening. The patient has a history of malignant melanoma. Today, the patient reports a lesion of concern on her right upper back.  This has been present for a little over a month and has bled.  She is wondering if it is of concern.  Additionally, patient has noticed repigmentation on her mid abdomen from a previous biopsy site.  She like to make sure it is not of concern. No other specific cutaneous concerns. Tries being diligent with photoprotection.     Physical Exam:  Vitals: LMP 09/26/2014 (Exact Date)   LYMPH: No cervical, axillary or inguinal  lymphadenopathy.   SKIN: Total skin excluding the genitalia areas was performed. The exam included the scalp, face, neck, bilateral arms, chest, back, abdomen, bilateral legs, digits, mons pubis, buttocks, and nails.   - Otero II.  -mid abdomen, biopsy scar and at the superior center of the scar is homogeneous brown pigmentation  -NUB, right upper back, erythematous, hyperkeratotic plaque  -NUB, left posterior upper arm, dark brown macule with asymmetry on dermoscopy.  - The right upper chest demonstrates a well healed scar with no nodularity, repigmentation, or pain to palpation.   - Multiple tan/brown macules and papules scattered throughout exam, consistent with benign nevi. No concerning features on dermoscopy.   - Scattered tan, homogenous macules scattered on sun exposed skin, consistent with solar lentigines.   - Scattered waxy, stuck on appearing papules and patches, consistent with seborrheic keratoses.  - Several 1-2 mm red dome shaped symmetric papules, consistent with cherry angiomas.           - No other lesions of concern on areas examined.     SYSTEMS REVIEW  The patient denies unintended weight loss, fevers, chills, night sweats, headache, cough, bloody sputum, shortness of breath, abdominal pain, nausea, numbness/weakness, swollen glands, bone pain, or changing pigmented skin lesions.    Medications:  Current Outpatient Medications   Medication Sig Dispense Refill    losartan-hydrochlorothiazide (HYZAAR) 100-12.5 MG tablet Take 1 tablet by mouth daily 90 tablet 3     No current facility-administered medications for this visit.      Past Medical History:   Patient Active Problem List   Diagnosis    CARDIOVASCULAR SCREENING; LDL GOAL LESS THAN 160    Hypertension goal BP (blood pressure) < 140/90    Menopause    GERD (gastroesophageal reflux disease)     Past Medical History:   Diagnosis Date    Hypertension        CC No referring provider defined for this encounter. on close of this encounter.

## 2024-05-30 ENCOUNTER — OFFICE VISIT (OUTPATIENT)
Dept: DERMATOLOGY | Facility: CLINIC | Age: 59
End: 2024-05-30
Payer: COMMERCIAL

## 2024-05-30 DIAGNOSIS — Z85.820 PERSONAL HISTORY OF MALIGNANT MELANOMA: ICD-10-CM

## 2024-05-30 DIAGNOSIS — D22.9 MULTIPLE BENIGN NEVI: ICD-10-CM

## 2024-05-30 DIAGNOSIS — D18.01 CHERRY ANGIOMA: ICD-10-CM

## 2024-05-30 DIAGNOSIS — D48.9 NEOPLASM OF UNCERTAIN BEHAVIOR: ICD-10-CM

## 2024-05-30 DIAGNOSIS — D48.5 NEOPLASM OF UNCERTAIN BEHAVIOR OF SKIN: ICD-10-CM

## 2024-05-30 DIAGNOSIS — L82.1 SEBORRHEIC KERATOSIS: Primary | ICD-10-CM

## 2024-05-30 PROCEDURE — 99213 OFFICE O/P EST LOW 20 MIN: CPT | Mod: 25 | Performed by: STUDENT IN AN ORGANIZED HEALTH CARE EDUCATION/TRAINING PROGRAM

## 2024-05-30 PROCEDURE — 11103 TANGNTL BX SKIN EA SEP/ADDL: CPT | Performed by: STUDENT IN AN ORGANIZED HEALTH CARE EDUCATION/TRAINING PROGRAM

## 2024-05-30 PROCEDURE — 88342 IMHCHEM/IMCYTCHM 1ST ANTB: CPT | Performed by: PATHOLOGY

## 2024-05-30 PROCEDURE — 88305 TISSUE EXAM BY PATHOLOGIST: CPT | Performed by: PATHOLOGY

## 2024-05-30 PROCEDURE — 11102 TANGNTL BX SKIN SINGLE LES: CPT | Performed by: STUDENT IN AN ORGANIZED HEALTH CARE EDUCATION/TRAINING PROGRAM

## 2024-05-30 NOTE — PROGRESS NOTES
The following medication was given:     MEDICATION:  Lidocaine with epinephrine 1% [id number]  ROUTE: SQ  SITE: see procedure note  DOSE: 2mL  LOT #: [id number]  : [employer]  EXPIRATION DATE: 07-  NDC#: 06114-161-58  Was there drug waste? no  Multi-dose vial: Yes    Janelle Jensen LPN  May 30, 2024

## 2024-05-30 NOTE — NURSING NOTE
Deborah E Weil's goals for this visit include:   Chief Complaint   Patient presents with    Skin Check     Pt is here for a FBSC. Spot on the back of the neck, Biopsy spot on the stomach has something growing back in it, R shin  Hx of melanoma on the right clavicle, no hx of skin cancer in family       She requests these members of her care team be copied on today's visit information:     PCP: Kathleen Oliver    Referring Provider:  CAMILO [facility]  [address]  [place] [address]    [facility] 09/26/2014 (Exact Date)     Do you need any medication refills at today's visit?     Janelle Jensen LPN on 5/30/2024 at 9:24 AM

## 2024-05-30 NOTE — PATIENT INSTRUCTIONS
Wound Care After a Biopsy    What is a skin biopsy?  A skin biopsy allows the doctor to examine a very small piece of tissue under the microscope to determine the diagnosis and the best treatment for the skin condition. A local anesthetic (numbing medicine)  is injected with a very small needle into the skin area to be tested. A small piece of skin is taken from the area. Sometimes a suture (stitch) is used.     What are the risks of a skin biopsy?  I will experience scar, bleeding, swelling, pain, crusting and redness. I may experience incomplete removal or recurrence. Risks of this procedure are excessive bleeding, bruising, infection, nerve damage, numbness, thick (hypertrophic or keloidal) scar and non-diagnostic biopsy.    How should I care for my wound for the first 24 hours?  Keep the wound dry and covered for 24 hours  If it bleeds, hold direct pressure on the area for 15 minutes. If bleeding does not stop then go to the emergency room  Avoid strenuous exercise the first 1-2 days or as your doctor instructs you    How should I care for the wound after 24 hours?  After 24 hours, remove the bandage  You may bathe or shower as normal  If you had a scalp biopsy, you can shampoo as usual and can use shower water to clean the biopsy site daily  Clean the wound twice a day with gentle soap and water  Do not scrub, be gentle  Apply white petroleum/Vaseline after cleaning the wound with a cotton swab or a clean finger, and keep the site covered with a Bandaid /bandage. Bandages are not necessary with a scalp biopsy  If you are unable to cover the site with a Bandaid /bandage, re-apply ointment 2-3 times a day to keep the site moist. Moisture will help with healing  Avoid strenuous activity for first 1-2 days  Avoid lakes, rivers, pools, and oceans until the stitches are removed or the site is healed    How do I clean my wound?  Wash hands thoroughly with soap or use hand  before all wound care  Clean the  wound with gentle soap and water  Apply white petroleum/Vaseline  to wound after it is clean  Replace the Bandaid /bandage to keep the wound covered for the first few days or as instructed by your doctor  If you had a scalp biopsy, warm shower water to the area on a daily basis should suffice    What should I use to clean my wound?   Cotton-tipped applicators (Qtips )  White petroleum jelly (Vaseline ). Use a clean new container and use Q-tips to apply.  Bandaids   as needed  Gentle soap     How should I care for my wound long term?  Do not get your wound dirty  Keep up with wound care for one week or until the area is healed.  A small scab will form and fall off by itself when the area is completely healed. The area will be red and will become pink in color as it heals. Sun protection is very important for how your scar will turn out. Sunscreen with an SPF 30 or greater is recommended once the area is healed.  If you have stitches, stitches need to be removed in 7 days on the face/neck, or 10-14 days on the body days. You may return to our clinic for this or you may have it done locally at your doctor s office.  You should have some soreness but it should be mild and slowly go away over several days. Talk to your doctor about using tylenol for pain,    When should I call my doctor?  If you have increased:   Pain or swelling  Pus or drainage (clear or slightly yellow drainage is ok)  Temperature over 100F  Spreading redness or warmth around wound    When will I hear about my results?  The biopsy results can take 2 weeks to come back.  Your results will automatically release to [facility] before your provider has even reviewed them.  The clinic will call you with the results, send you a [email or web address] message, or have you schedule a follow-up clinic or phone time to discuss the results.  Contact our clinics if you do not hear from us in 2 weeks. If further treatment is needed for a skin cancer, this will be done at either our  [place] or [place] office.    Who should I call with questions?  [facility]: [phone]  [facility]: [phone]  For urgent needs outside of business hours call the [facility] at [phone] and ask for the dermatology resident on call   Patient Education       Proper skin care from [facility]:    -Eliminate harsh soaps as they strip the natural oils from the skin, often resulting in dry itchy skin ( i.e. Dial, Zest, [place])  -Use mild soaps such as Cetaphil or Dove Sensitive Skin in the shower. You do not need to use soap on arms, legs, and trunk every time you shower unless visibly soiled.   -Avoid hot or cold showers.  -After showering, lightly dry off and apply moisturizing within 2-3 minutes. This will help trap moisture in the skin.   -Aggressive use of a moisturizer at least 1-2 times a day to the entire body (including -Vanicream, Cetaphil, Aquaphor or Cerave) and moisturize hands after every washing.  -We recommend using moisturizers that come in a tub that needs to be scooped out, not a pump. This has more of an oil base. It will hold moisture in your skin much better than a water base moisturizer. The above recommended are non-pore clogging.      Wear a sunscreen with at least SPF 30 on your face, ears, neck and V of the chest daily. Wear sunscreen on other areas of the body if those areas are exposed to the sun throughout the day. Sunscreens can contain physical and/or chemical blockers. Physical blockers are less likely to clog pores, these include zinc oxide and titanium dioxide. Reapply every two hour and after swimming.     Sunscreen examples: https://www.ewg.org/sunscreen/    UV radiation  UVA radiation remains constant throughout the day and throughout the year. It is a longer wavelength than UVB and therefore penetrates deeper into the skin leading to immediate and delayed tanning, photoaging, and  skin cancer. 70-80% of UVA and UVB radiation occurs between the hours of 10am-2pm.  UVB radiation  UVB radiation causes the most harmful effects and is more significant during the summer months. However, snow and ice can reflect UVB radiation leading to skin damage during the winter months as well. UVB radiation is responsible for tanning, burning, inflammation, delayed erythema (pinkness), pigmentation (brown spots), and skin cancer.     I recommend self monthly full body exams and yearly full body exams with a dermatology provider. If you develop a new or changing lesion please follow up for examination. Most skin cancers are pink and scaly or pink and pearly. However, we do see blue/brown/black skin cancers.  Consider the ABCDEs of melanoma when giving yourself your monthly full body exam ( don't forget the groin, buttocks, feet, toes, etc). A-asymmetry, B-borders, C-color, D-diameter, E-elevation or evolving. If you see any of these changes please follow up in clinic. If you cannot see your back I recommend purchasing a hand held mirror to use with a larger wall mirror.       Checking for Skin Cancer  You can find cancer early by checking your skin each month. There are 3 kinds of skin cancer. They are melanoma, basal cell carcinoma, and squamous cell carcinoma. Doing monthly skin checks is the best way to find new marks or skin changes. Follow the instructions below for checking your skin.   The ABCDEs of checking moles for melanoma   Check your moles or growths for signs of melanoma using ABCDE:   Asymmetry: the sides of the mole or growth don t match  Border: the edges are ragged, notched, or blurred  Color: the color within the mole or growth varies  Diameter: the mole or growth is larger than 6 mm (size of a pencil eraser)  Evolving: the size, shape, or color of the mole or growth is changing (evolving is not shown in the images below)    Checking for other types of skin cancer  Basal cell carcinoma or  squamous cell carcinoma have symptoms such as:     A spot or mole that looks different from all other marks on your skin  Changes in how an area feels, such as itching, tenderness, or pain  Changes in the skin's surface, such as oozing, bleeding, or scaliness  A sore that does not heal  New swelling or redness beyond the border of a mole    Who s at risk?  Anyone can get skin cancer. But you are at greater risk if you have:   Fair skin, light-colored hair, or light-colored eyes  Many moles or abnormal moles on your skin  A history of sunburns from sunlight or tanning beds  A family history of skin cancer  A history of exposure to radiation or chemicals  A weakened immune system  If you have had skin cancer in the past, you are at risk for recurring skin cancer.   How to check your skin  Do your monthly skin checkups in front of a full-length mirror. Check all parts of your body, including your:   Head (ears, face, neck, and scalp)  Torso (front, back, and sides)  Arms (tops, undersides, upper, and lower armpits)  Hands (palms, backs, and fingers, including under the nails)  Buttocks and genitals  Legs (front, back, and sides)  Feet (tops, soles, toes, including under the nails, and between toes)  If you have a lot of moles, take digital photos of them each month. Make sure to take photos both up close and from a distance. These can help you see if any moles change over time.   Most skin changes are not cancer. But if you see any changes in your skin, call your doctor right away. Only he or she can diagnose a problem. If you have skin cancer, seeing your doctor can be the first step toward getting the treatment that could save your life.   [employer] last reviewed this educational content on 4/1/2019 2000-2020 [employer]. [address], [place] [address]. All rights reserved. This information is not intended as a substitute for professional medical care. Always follow your healthcare  professional's instructions.       When should I call my doctor?  If you are worsening or not improving, please, contact us or seek urgent care as noted below.     Who should I call with questions (adults)?  [facility] (adult and pediatric): [phone]  [facility] (adult): [phone]  [facility] ([facility] and [place]) [phone]  For urgent needs outside of business hours call the [facility] at [phone] and ask for the dermatology resident on call to be paged  If this is a medical emergency and you are unable to reach an ER, Call [phone]      If you need a prescription refill, please contact your pharmacy. Refills are approved or denied by our Physicians during normal business hours, Monday through Fridays  Per office policy, refills will not be granted if you have not been seen within the past year (or sooner depending on your child's condition

## 2024-05-30 NOTE — LETTER
5/30/2024         RE: Deborah E Weil  [id number] [address]        Dear Colleague,    Thank you for referring your patient, Deborah E Weil, to the [facility]. Please see a copy of my visit note below.    [facility] Dermatology Note  Encounter Date: May 30, 2024  Office Visit     Reviewed patients past medical history and pertinent chart review prior to patients visit today.     Dermatology Problem List:  Last skin check 5/31/2023     0. NUB right upper back and left posterior upper arm, shave biopsy 05/30/24 .     Melanoma in situ, lentiginous type, R upper chest, Mohs scheduled 8/16/23  2.  History of benign biopsies   - left breast s/p bx with PCP - SK  - mid abdomen- bx 5/31/23 intradermal nevus, repigmentation noted within biopsy site on 5/30/2024  3. Hx DN   - left lower back, s/p bx 5/31/2023 - mild atypia  Family Hx: no hx  ____________________________________________    Assessment & Plan:     # Neoplasm of uncertain behavior:  right upper back  DDx includes NMSC vs other. Shave biopsy today.  # Neoplasm of uncertain behavior:  left posterior upper arm  DDx includes MM vs Dysplastic nevus. Shave biopsy today.    Procedure Note: Biopsy by shave technique  The risks and benefits of the procedure were described to the patient. These include but are not limited to bleeding, infection, scar, incomplete removal, and non-diagnostic biopsy. Verbal informed consent was obtained. The above site(s) was cleansed with an alcohol pad and injected with 1% lidocaine with epinephrine. Once anesthesia was obtained, a biopsy(ies) was performed with Gilette blade. The tissue(s) was placed in a labeled container(s) with formalin and sent to pathology. Hemostasis was achieved with aluminum chloride. Vaseline and a bandage were applied to the wound(s). The patient tolerated the procedure well and was given post biopsy care instructions.    # repigmenting nevus within  "previous biopsy scar, mid abdomen   -repigmentation is with in biopsy scar. Biopsy from area on 5/31/23 resulted as \"predominantly intradermal melanoncytic nevus\". Given this and the fact that repigmentation is within biopsy scar, can continue with observation. Will recheck at next skin check.    # Personal history of malignant melanoma  # Multiple nevi, trunk and extremities  # Solar lentigines  - No signs of recurrence. Continued observation recommended.   - Nevi demonstrate no concerning features on dermoscopy. We discussed the importance of self exams at home.   - ABCDEs: Counseled ABCDEs of melanoma: Asymmetry, Border (irregularity), Color (not uniform, changes in color), Diameter (greater than 6 mm which is about the size of a pencil eraser), and Evolving (any changes in preexisting moles).  - Sun protection: Counseled SPF 30+ sunscreen, UPF clothing, sun avoidance, tanning bed avoidance.    # Cherry angiomas  # Seborrheic keratoses  - We discussed the benign nature of the skin lesions. No treatment required. Continued observation recommended. Follow up with any concerns.      Follow-up:  6 months for follow up full body skin exam, as needed for new or changing lesions or new concerns    All risks, benefits and alternatives were discussed with patient.  Patient is in agreement and understands the assessment and plan.  All questions were answered.  Sierra Nelson PA-C  [facility]    ____________________________________________    CC: No chief complaint on file.    HPI:  Ms. Deborah E Weil is a(n) 59 year old female who presents today as a return patient for a full body skin cancer screening. The patient has a history of malignant melanoma. Today, the patient reports a lesion of concern on her right upper back.  This has been present for a little over a month and has bled.  She is wondering if it is of concern.  Additionally, patient has noticed repigmentation on her mid abdomen from a " previous biopsy site.  She like to make sure it is not of concern. No other specific cutaneous concerns. Tries being diligent with photoprotection.     Physical Exam:  Vitals: LMP 09/26/2014 (Exact Date)   LYMPH: No cervical, axillary or inguinal lymphadenopathy.   SKIN: Total skin excluding the genitalia areas was performed. The exam included the scalp, face, neck, bilateral arms, chest, back, abdomen, bilateral legs, digits, mons pubis, buttocks, and nails.   - Otero II.  -mid abdomen, biopsy scar and at the superior center of the scar is homogeneous brown pigmentation  -NUB, right upper back, erythematous, hyperkeratotic plaque  -NUB, left posterior upper arm, dark brown macule with asymmetry on dermoscopy.  - The right upper chest demonstrates a well healed scar with no nodularity, repigmentation, or pain to palpation.   - Multiple tan/brown macules and papules scattered throughout exam, consistent with benign nevi. No concerning features on dermoscopy.   - Scattered tan, homogenous macules scattered on sun exposed skin, consistent with solar lentigines.   - Scattered waxy, stuck on appearing papules and patches, consistent with seborrheic keratoses.  - Several 1-2 mm red dome shaped symmetric papules, consistent with cherry angiomas.           - No other lesions of concern on areas examined.     SYSTEMS REVIEW  The patient denies unintended weight loss, fevers, chills, night sweats, headache, cough, bloody sputum, shortness of breath, abdominal pain, nausea, numbness/weakness, swollen glands, bone pain, or changing pigmented skin lesions.    Medications:  Current Outpatient Medications   Medication Sig Dispense Refill     losartan-hydrochlorothiazide (HYZAAR) 100-12.5 MG tablet Take 1 tablet by mouth daily 90 tablet 3     No current facility-administered medications for this visit.      Past Medical History:   Patient Active Problem List   Diagnosis     CARDIOVASCULAR SCREENING; LDL GOAL LESS THAN 160      Hypertension goal BP (blood pressure) < 140/90     Menopause     GERD (gastroesophageal reflux disease)     Past Medical History:   Diagnosis Date     Hypertension        CC No referring provider defined for this encounter. on close of this encounter.       Again, thank you for allowing me to participate in the care of your patient.        Sincerely,        Sierra Nelson PA-C

## 2024-06-05 ENCOUNTER — TELEPHONE (OUTPATIENT)
Dept: DERMATOLOGY | Facility: CLINIC | Age: 59
End: 2024-06-05
Payer: COMMERCIAL

## 2024-06-05 NOTE — TELEPHONE ENCOUNTER
Pt read [employer] message and will call the clinic with any questions or concerns.   Marni Estes RN on 6/5/2024 at 1:53 PM      Final Diagnosis  A(1). Skin, right upper back, shave:  - Lichenoid keratosis - (see description)     B(2). Skin, L posterior upper arm, shave:  - Junctional dysplastic nevus with mild atypia - (see description)     Electronically signed by Robert Jeffery MD on 6/5/2024 at  1:40 PM      Niko Case,     We received your biopsy results back. I wanted to let you know the biopsy of your left posterior upper arm showed a mildly dysplastic (or atypical) nevus. Dysplastic nevi are moles that have atypical cells. Atypical cells are graded as mild, moderate, or severe. Mild and moderate are considered variants of normal. Nothing further needs to be done for this mole as long as brown pigment does not return. If you ever see the brown mole color coming back inside the scar please come back for me to take a look at it. If it recurs we will want it to be removed further.  The biopsy from your right upper back resulted as a lichenoid keratosis.  This is a benign lesion and no further treatment needs to be done.  Please let me know if you have questions.     Sierra Nelson PA-C  Dermatology  Written by Sierra Nelson PA-C on 6/5/2024  1:44 PM CDT  Seen by patient Evie EMMANUEL Weil on 6/5/2024  1:52 PM

## 2024-09-04 ENCOUNTER — PATIENT OUTREACH (OUTPATIENT)
Dept: CARE COORDINATION | Facility: CLINIC | Age: 59
End: 2024-09-04
Payer: COMMERCIAL

## 2024-10-09 SDOH — HEALTH STABILITY: PHYSICAL HEALTH: ON AVERAGE, HOW MANY MINUTES DO YOU ENGAGE IN EXERCISE AT THIS LEVEL?: 30 MIN

## 2024-10-09 SDOH — HEALTH STABILITY: PHYSICAL HEALTH: ON AVERAGE, HOW MANY DAYS PER WEEK DO YOU ENGAGE IN MODERATE TO STRENUOUS EXERCISE (LIKE A BRISK WALK)?: 2 DAYS

## 2024-10-09 ASSESSMENT — SOCIAL DETERMINANTS OF HEALTH (SDOH): HOW OFTEN DO YOU GET TOGETHER WITH FRIENDS OR RELATIVES?: MORE THAN THREE TIMES A WEEK

## 2024-10-11 ENCOUNTER — OFFICE VISIT (OUTPATIENT)
Dept: FAMILY MEDICINE | Facility: OTHER | Age: 59
End: 2024-10-11
Payer: COMMERCIAL

## 2024-10-11 VITALS
RESPIRATION RATE: 20 BRPM | TEMPERATURE: 97.8 F | BODY MASS INDEX: 29.89 KG/M2 | OXYGEN SATURATION: 96 % | HEIGHT: 66 IN | SYSTOLIC BLOOD PRESSURE: 102 MMHG | HEART RATE: 70 BPM | WEIGHT: 186 LBS | DIASTOLIC BLOOD PRESSURE: 76 MMHG

## 2024-10-11 DIAGNOSIS — Z12.4 CERVICAL CANCER SCREENING: ICD-10-CM

## 2024-10-11 DIAGNOSIS — Z12.31 ENCOUNTER FOR SCREENING MAMMOGRAM FOR BREAST CANCER: ICD-10-CM

## 2024-10-11 DIAGNOSIS — E78.5 HYPERLIPIDEMIA LDL GOAL <130: ICD-10-CM

## 2024-10-11 DIAGNOSIS — Z00.00 ROUTINE GENERAL MEDICAL EXAMINATION AT A HEALTH CARE FACILITY: Primary | ICD-10-CM

## 2024-10-11 DIAGNOSIS — I10 HYPERTENSION GOAL BP (BLOOD PRESSURE) < 140/90: ICD-10-CM

## 2024-10-11 DIAGNOSIS — D03.59 MELANOMA IN SITU OF TORSO EXCLUDING BREAST (H): ICD-10-CM

## 2024-10-11 DIAGNOSIS — E55.9 VITAMIN D DEFICIENCY: ICD-10-CM

## 2024-10-11 LAB
ANION GAP SERPL CALCULATED.3IONS-SCNC: 13 MMOL/L (ref 7–15)
BUN SERPL-MCNC: 17.3 MG/DL (ref 6–20)
CALCIUM SERPL-MCNC: 9.5 MG/DL (ref 8.8–10.4)
CHLORIDE SERPL-SCNC: 104 MMOL/L (ref 98–107)
CHOLEST SERPL-MCNC: 204 MG/DL
CREAT SERPL-MCNC: 0.9 MG/DL (ref 0.51–0.95)
EGFRCR SERPLBLD CKD-EPI 2021: 74 ML/MIN/1.73M2
FASTING STATUS PATIENT QL REPORTED: YES
FASTING STATUS PATIENT QL REPORTED: YES
GLUCOSE SERPL-MCNC: 97 MG/DL (ref 70–99)
HCO3 SERPL-SCNC: 27 MMOL/L (ref 22–29)
HDLC SERPL-MCNC: 73 MG/DL
LDLC SERPL CALC-MCNC: 122 MG/DL
NONHDLC SERPL-MCNC: 131 MG/DL
POTASSIUM SERPL-SCNC: 4.5 MMOL/L (ref 3.4–5.3)
SODIUM SERPL-SCNC: 144 MMOL/L (ref 135–145)
TRIGL SERPL-MCNC: 45 MG/DL
VIT D+METAB SERPL-MCNC: 20 NG/ML (ref 20–50)

## 2024-10-11 PROCEDURE — 99213 OFFICE O/P EST LOW 20 MIN: CPT | Mod: 25 | Performed by: NURSE PRACTITIONER

## 2024-10-11 PROCEDURE — 87624 HPV HI-RISK TYP POOLED RSLT: CPT | Performed by: NURSE PRACTITIONER

## 2024-10-11 PROCEDURE — 80061 LIPID PANEL: CPT | Performed by: NURSE PRACTITIONER

## 2024-10-11 PROCEDURE — 36415 COLL VENOUS BLD VENIPUNCTURE: CPT | Performed by: NURSE PRACTITIONER

## 2024-10-11 PROCEDURE — 80048 BASIC METABOLIC PNL TOTAL CA: CPT | Performed by: NURSE PRACTITIONER

## 2024-10-11 PROCEDURE — 82306 VITAMIN D 25 HYDROXY: CPT | Performed by: NURSE PRACTITIONER

## 2024-10-11 PROCEDURE — 99396 PREV VISIT EST AGE 40-64: CPT | Performed by: NURSE PRACTITIONER

## 2024-10-11 PROCEDURE — G0145 SCR C/V CYTO,THINLAYER,RESCR: HCPCS | Performed by: NURSE PRACTITIONER

## 2024-10-11 RX ORDER — LOSARTAN POTASSIUM AND HYDROCHLOROTHIAZIDE 12.5; 1 MG/1; MG/1
1 TABLET ORAL DAILY
Qty: 90 TABLET | Refills: 3 | Status: SHIPPED | OUTPATIENT
Start: 2024-10-11

## 2024-10-11 ASSESSMENT — PAIN SCALES - GENERAL: PAINLEVEL: NO PAIN (0)

## 2024-10-11 NOTE — PROGRESS NOTES
"Preventive Care Visit  [facility]  CAMILO Murcia CNP, Family Medicine  Oct 11, 2024      Assessment & Plan     Routine general medical examination at a health care facility  Updated HM   Will follow up to have influenza and covid-19 vaccine     Melanoma in situ of torso excluding breast (H)  Followed by specialty     Hypertension goal BP (blood pressure) < 140/90  Stable refill today with labs.   - BASIC METABOLIC PANEL; Future  - losartan-hydrochlorothiazide (HYZAAR) 100-12.5 MG tablet; Take 1 tablet by mouth daily.  - BASIC METABOLIC PANEL    Hyperlipidemia LDL goal <130  The 10-year ASCVD risk score (Lorena APONTE, et al., 2019) is: 2%    Values used to calculate the score:      Age: 59 years      Sex: Female      Is Non- : No      Diabetic: No      Tobacco smoker: No      Systolic Blood Pressure: 102 mmHg      Is BP treated: Yes      HDL Cholesterol: 61 mg/dL      Total Cholesterol: 187 mg/dL    - Lipid panel reflex to direct LDL Fasting; Future  - Lipid panel reflex to direct LDL Fasting    Cervical cancer screening    - HPV and Gynecologic Cytology Panel - Recommended Age 30 - 65 Years    Encounter for screening mammogram for breast cancer    - MA Screening Digital Bilateral; Future    Vitamin D deficiency  Recheck today   - Vitamin D Deficiency; Future  - Vitamin D Deficiency    Patient has been advised of split billing requirements and indicates understanding: Yes        BMI  Estimated body mass index is 30.07 kg/m  as calculated from the following:    Height as of this encounter: 1.675 m (5' 5.95\").    Weight as of this encounter: 84.4 kg (186 lb).   Weight management plan: Discussed healthy diet and exercise guidelines    Counseling  Appropriate preventive services were addressed with this patient via screening, questionnaire, or discussion as appropriate for fall prevention, nutrition, physical activity, Tobacco-use cessation, social engagement, weight " loss and cognition.  Checklist reviewing preventive services available has been given to the patient.  Reviewed patient's diet, addressing concerns and/or questions.   She is at risk for lack of exercise and has been provided with information to increase physical activity for the benefit of her well-being.   The patient was instructed to see the dentist every 6 months.       See Patient Instructions    Subjective   Evie is a 59 year old, presenting for the following:  Physical        10/11/2024     9:13 AM   Additional Questions   Roomed by Josefina   Accompanied by Self         10/11/2024     9:13 AM   Patient Reported Additional Medications   Patient reports taking the following new medications Magnesium        Health Care Directive  Patient does not have a Health Care Directive or Living Will: Discussed advance care planning with patient; however, patient declined at this time.    HPI      Hypertension Follow-up    Do you check your blood pressure regularly outside of the clinic? Yes   Are you following a low salt diet? Yes  Are your blood pressures ever more than 140 on the top number (systolic) OR more   than 90 on the bottom number (diastolic), for example 140/90? No        10/9/2024   General Health   How would you rate your overall physical health? Excellent   Feel stress (tense, anxious, or unable to sleep) Not at all            10/9/2024   Nutrition   Three or more servings of calcium each day? Yes   Diet: Regular (no restrictions)    Low salt   How many servings of fruit and vegetables per day? (!) 2-3   How many sweetened beverages each day? 0-1       Multiple values from one day are sorted in reverse-chronological order         10/9/2024   Exercise   Days per week of moderate/strenous exercise 2 days   Average minutes spent exercising at this level 30 min      (!) EXERCISE CONCERN      10/9/2024   Social Factors   Frequency of gathering with friends or relatives More than three times a week   Worry food  won't last until get money to buy more No   Food not last or not have enough money for food? No   Do you have housing? (Housing is defined as stable permanent housing and does not include staying ouside in a car, in a tent, in an abandoned building, in an overnight shelter, or couch-surfing.) Yes   Are you worried about losing your housing? No   Lack of transportation? No   Unable to get utilities (heat,electricity)? No            10/9/2024   Fall Risk   Fallen 2 or more times in the past year? No   Trouble with walking or balance? No             10/9/2024   Dental   Dentist two times every year? (!) NO            10/9/2024   TB Screening   Were you born outside of the [place]? No            Today's PHQ-2 Score:       5/30/2024     9:26 AM   PHQ-2 ( 1999 [employer])   Q1: Little interest or pleasure in doing things 0   Q2: Feeling down, depressed or hopeless 0   PHQ-2 Score 0         10/9/2024   Substance Use   Alcohol more than 3/day or more than 7/wk No   Do you use any other substances recreationally? No        Social History     Tobacco Use    Smoking status: Never    Smokeless tobacco: Never   Vaping Use    Vaping status: Never Used   Substance Use Topics    Alcohol use: Yes     Comment: Occ at social events, 4 drinks a month    Drug use: No         2/15/2024   LAST FHS-7 RESULTS   1st degree relative breast or ovarian cancer No   Any relative bilateral breast cancer No   Any male have breast cancer No   Any ONE woman have BOTH breast AND ovarian cancer No   Any woman with breast cancer before 50yrs No   2 or more relatives with breast AND/OR ovarian cancer No   2 or more relatives with breast AND/OR bowel cancer No        Mammogram Screening - Mammogram every 1-2 years updated in Health Maintenance based on mutual decision making        10/9/2024   STI Screening   New sexual partner(s) since last STI/HIV test? No        History of abnormal Pap smear: No - age 30- 64 PAP with HPV every 5 years recommended        Latest  "Ref Rng & Units 1/25/2019     1:26 PM 1/25/2019     1:19 PM 10/21/2014    12:00 AM   PAP / HPV   PAP (Historical)   NIL  NIL    HPV 16 DNA NEG^Negative Negative      HPV 18 DNA NEG^Negative Negative      Other HR HPV NEG^Negative Negative        ASCVD Risk   The 10-year ASCVD risk score (Lorena APONTE, et al., 2019) is: 2%    Values used to calculate the score:      Age: 59 years      Sex: Female      Is Non- : No      Diabetic: No      Tobacco smoker: No      Systolic Blood Pressure: 102 mmHg      Is BP treated: Yes      HDL Cholesterol: 61 mg/dL      Total Cholesterol: 187 mg/dL        Reviewed and updated as needed this visit by Provider                    Past Medical History:   Diagnosis Date    Hypertension          Review of Systems  Constitutional, HEENT, cardiovascular, pulmonary, gi and gu systems are negative, except as otherwise noted.     Objective    Exam  /76   Pulse 70   Temp 97.8  F (36.6  C) (Temporal)   Resp 20   Ht 1.675 m (5' 5.95\")   Wt 84.4 kg (186 lb)   LMP 09/26/2014 (Exact Date)   SpO2 96%   BMI 30.07 kg/m     Estimated body mass index is 30.07 kg/m  as calculated from the following:    Height as of this encounter: 1.675 m (5' 5.95\").    Weight as of this encounter: 84.4 kg (186 lb).    Physical Exam  GENERAL: alert and no distress  EYES: Eyes grossly normal to inspection, PERRL and conjunctivae and sclerae normal  HENT: ear canals and TM's normal, nose and mouth without ulcers or lesions  NECK: no adenopathy, no asymmetry, masses, or scars  RESP: lungs clear to auscultation - no rales, rhonchi or wheezes  BREAST: normal without masses, tenderness or nipple discharge and no palpable axillary masses or adenopathy  CV: regular rate and rhythm, normal S1 S2, no S3 or S4, no murmur, click or rub, no peripheral edema  ABDOMEN: soft, nontender, no hepatosplenomegaly, no masses and bowel sounds normal   (female): normal female external genitalia, normal " urethral meatus , normal vaginal mucosa, and normal cervix, adnexae, and uterus without masses.  MS: no gross musculoskeletal defects noted, no edema  SKIN: no suspicious lesions or rashes  NEURO: Normal strength and tone, mentation intact and speech normal  PSYCH: mentation appears normal, affect normal/bright        Signed Electronically by: CAMILO Murcia CNP

## 2024-10-11 NOTE — PATIENT INSTRUCTIONS
Patient Education   Preventive Care Advice   This is general advice given by our system to help you stay healthy. However, your care team may have specific advice just for you. Please talk to your care team about your preventive care needs.  Nutrition  Eat 5 or more servings of fruits and vegetables each day.  Try wheat bread, brown rice and whole grain pasta (instead of white bread, rice, and pasta).  Get enough calcium and vitamin D. Check the label on foods and aim for 100% of the RDA (recommended daily allowance).  Lifestyle  Exercise at least 150 minutes each week  (30 minutes a day, 5 days a week).  Do muscle strengthening activities 2 days a week. These help control your weight and prevent disease.  No smoking.  Wear sunscreen to prevent skin cancer.  Have a dental exam and cleaning every 6 months.  Yearly exams  See your health care team every year to talk about:  Any changes in your health.  Any medicines your care team has prescribed.  Preventive care, family planning, and ways to prevent chronic diseases.  Shots (vaccines)   HPV shots (up to age 26), if you've never had them before.  Hepatitis B shots (up to age 59), if you've never had them before.  COVID-19 shot: Get this shot when it's due.  Flu shot: Get a flu shot every year.  Tetanus shot: Get a tetanus shot every 10 years.  Pneumococcal, hepatitis A, and RSV shots: Ask your care team if you need these based on your risk.  Shingles shot (for age 50 and up)  General health tests  Diabetes screening:  Starting at age 35, Get screened for diabetes at least every 3 years.  If you are younger than age 35, ask your care team if you should be screened for diabetes.  Cholesterol test: At age 39, start having a cholesterol test every 5 years, or more often if advised.  Bone density scan (DEXA): At age 50, ask your care team if you should have this scan for osteoporosis (brittle bones).  Hepatitis C: Get tested at least once in your life.  STIs (sexually  transmitted infections)  Before age 24: Ask your care team if you should be screened for STIs.  After age 24: Get screened for STIs if you're at risk. You are at risk for STIs (including HIV) if:  You are sexually active with more than one person.  You don't use condoms every time.  You or a partner was diagnosed with a sexually transmitted infection.  If you are at risk for HIV, ask about PrEP medicine to prevent HIV.  Get tested for HIV at least once in your life, whether you are at risk for HIV or not.  Cancer screening tests  Cervical cancer screening: If you have a cervix, begin getting regular cervical cancer screening tests starting at age 21.  Breast cancer scan (mammogram): If you've ever had breasts, begin having regular mammograms starting at age 40. This is a scan to check for breast cancer.  Colon cancer screening: It is important to start screening for colon cancer at age 45.  Have a colonoscopy test every 10 years (or more often if you're at risk) Or, ask your provider about stool tests like a FIT test every year or Cologuard test every 3 years.  To learn more about your testing options, visit:   .  For help making a decision, visit:   https://bit.ly/lx82357.  Prostate cancer screening test: If you have a prostate, ask your care team if a prostate cancer screening test (PSA) at age 55 is right for you.  Lung cancer screening: If you are a current or former smoker ages 50 to 80, ask your care team if ongoing lung cancer screenings are right for you.  For informational purposes only. Not to replace the advice of your health care provider. Copyright   2023 [employer]. All rights reserved. Clinically reviewed by the [facility] Transitions Program. [employer] 112134 - REV 01/24.

## 2024-10-14 LAB
HPV HR 12 DNA CVX QL NAA+PROBE: NEGATIVE
HPV16 DNA CVX QL NAA+PROBE: NEGATIVE
HPV18 DNA CVX QL NAA+PROBE: NEGATIVE
HUMAN PAPILLOMA VIRUS FINAL DIAGNOSIS: NORMAL

## 2024-10-17 LAB
BKR AP ASSOCIATED HPV REPORT: NORMAL
BKR LAB AP GYN ADEQUACY: NORMAL
BKR LAB AP GYN INTERPRETATION: NORMAL
BKR LAB AP PREVIOUS ABNORMAL: NORMAL
PATH REPORT.COMMENTS IMP SPEC: NORMAL
PATH REPORT.COMMENTS IMP SPEC: NORMAL
PATH REPORT.RELEVANT HX SPEC: NORMAL

## 2024-12-23 ENCOUNTER — E-VISIT (OUTPATIENT)
Dept: URGENT CARE | Facility: CLINIC | Age: 59
End: 2024-12-23
Payer: COMMERCIAL

## 2024-12-23 DIAGNOSIS — J01.10 ACUTE NON-RECURRENT FRONTAL SINUSITIS: Primary | ICD-10-CM

## 2024-12-23 RX ORDER — IPRATROPIUM BROMIDE 42 UG/1
2 SPRAY, METERED NASAL 4 TIMES DAILY
Qty: 15 ML | Refills: 0 | Status: SHIPPED | OUTPATIENT
Start: 2024-12-23

## 2024-12-23 RX ORDER — CETIRIZINE HYDROCHLORIDE 10 MG/1
10 TABLET ORAL DAILY
Qty: 30 TABLET | Refills: 3 | Status: SHIPPED | OUTPATIENT
Start: 2024-12-23

## 2024-12-23 NOTE — PATIENT INSTRUCTIONS
Acute Sinusitis: Care Instructions  Overview     Acute sinusitis is an inflammation of the mucous membranes inside the nose and sinuses. Sinuses are the hollow spaces in your skull around the eyes and nose. Acute sinusitis often follows a cold. Acute sinusitis causes thick, discolored mucus that drains from the nose or down the back of the throat. It also can cause pain and pressure in your head and face along with a stuffy or blocked nose.  In most cases, sinusitis gets better on its own in 1 to 2 weeks. But some mild symptoms may last for several weeks. Sometimes antibiotics are needed if there is a bacterial infection.  Follow-up care is a key part of your treatment and safety. Be sure to make and go to all appointments, and call your doctor if you are having problems. It's also a good idea to know your test results and keep a list of the medicines you take.  How can you care for yourself at home?  Use saline (saltwater) nasal washes. This can help keep your nasal passages open and wash out mucus and allergens.  You can buy saline nose washes at a grocery store or drugstore. Follow the instructions on the package.  You can make your own at home. Add 1 teaspoon of non-iodized salt and 1 teaspoon of baking soda to 2 cups of distilled or boiled and cooled water. Fill a squeeze bottle or a nasal cleansing pot (such as a neti pot) with the nasal wash. Then put the tip into your nostril, and lean over the sink. With your mouth open, gently squirt the liquid. Repeat on the other side.  Try a decongestant nasal spray like oxymetazoline (Afrin). Do not use it for more than 3 days in a row. Using it for more than 3 days can make your congestion worse.  If needed, take an over-the-counter pain medicine, such as acetaminophen (Tylenol), ibuprofen (Advil, Motrin), or naproxen (Aleve). Read and follow all instructions on the label.  If the doctor prescribed antibiotics, take them as directed. Do not stop taking them just  "because you feel better. You need to take the full course of antibiotics.  Be careful when taking over-the-counter cold or flu medicines and Tylenol at the same time. Many of these medicines have acetaminophen, which is Tylenol. Read the labels to make sure that you are not taking more than the recommended dose. Too much acetaminophen (Tylenol) can be harmful.  Try a steroid nasal spray. It may help with your symptoms.  Breathe warm, moist air. You can use a steamy shower, a hot bath, or a sink filled with hot water. Avoid cold, dry air. Using a humidifier in your home may help. Follow the directions for cleaning the machine.  When should you call for help?   Call your doctor now or seek immediate medical care if:    You have new or worse swelling, redness, or pain in your face or around one or both of your eyes.     You have double vision or a change in your vision.     You have a high fever.     You have a severe headache and a stiff neck.     You have mental changes, such as feeling confused or much less alert.   Watch closely for changes in your health, and be sure to contact your doctor if:    You are not getting better as expected.   Where can you learn more?  Go to https://www.[employer].net/patiented  Enter I933 in the search box to learn more about \"Acute Sinusitis: Care Instructions.\"  Current as of: September 27, 2023  Content Version: 14.3    2024 [employer].   Care instructions adapted under license by your healthcare professional. If you have questions about a medical condition or this instruction, always ask your healthcare professional. [employer] disclaims any warranty or liability for your use of this information.     The symptoms you describe suggest a viral cause, which is much more common than a bacterial cause. Antibiotics will treat bacterial infections, but have no effect on viral infections. If possible, especially if improving, start with symptom care for the first " 7-10 days, then consider seeking further treatment or taking an antibiotic. Bacterial infections generally are more severe, including symptoms such as pus, fever over 101degrees F, or rapidly worsening.  You may want to try a nasal lavage (also known as nasal irrigation). You can find over-the-counter products, such as Neti-Pot, at retail locations or make your own at home. Instructions for homemade nasal lavage and more information on the process are available online at http://www.aafp.org/afp/2009/1115/p1121.html.    Dear Deborah E Weil    After reviewing your responses, I've been able to diagnose you with Acute non-recurrent frontal sinusitis.      Based on your responses and diagnosis, I have prescribed   Orders Placed This Encounter   Medications     ipratropium (ATROVENT) 0.06 % nasal spray     Sig: Spray 2 sprays into both nostrils 4 times daily.     Dispense:  15 mL     Refill:  0     cetirizine (ZYRTEC) 10 MG tablet     Sig: Take 1 tablet (10 mg) by mouth daily.     Dispense:  30 tablet     Refill:  3      to treat your symptoms. I have sent this to your pharmacy.?     It is also important to stay well hydrated, get lots of rest and take over-the-counter decongestants,?tylenol?or ibuprofen if you?are able to?take those medications per your primary care provider to help relieve discomfort.?     It is important that you take?all of?your prescribed medication even if your symptoms are improving after a few doses.? Taking?all of?your medicine helps prevent the symptoms from returning.?     If your symptoms worsen, you develop severe headache, vomiting, high fever (>102), or are not improving in 7 days, please contact your primary care provider for an appointment or visit any of our convenient Walk-in Care or Urgent Care Centers to be seen which can be found on our website?here.?     Thanks again for choosing?us?as your health care partner,?   ?  ESPINOZA ARREOLA, CAMILO CNP?

## 2025-01-16 ENCOUNTER — PATIENT OUTREACH (OUTPATIENT)
Dept: CARE COORDINATION | Facility: CLINIC | Age: 60
End: 2025-01-16
Payer: COMMERCIAL

## 2025-02-13 ENCOUNTER — PATIENT OUTREACH (OUTPATIENT)
Dept: CARE COORDINATION | Facility: CLINIC | Age: 60
End: 2025-02-13
Payer: COMMERCIAL

## 2025-04-18 ENCOUNTER — ANCILLARY PROCEDURE (OUTPATIENT)
Dept: MAMMOGRAPHY | Facility: CLINIC | Age: 60
End: 2025-04-18
Attending: NURSE PRACTITIONER
Payer: COMMERCIAL

## 2025-04-18 DIAGNOSIS — Z12.31 ENCOUNTER FOR SCREENING MAMMOGRAM FOR BREAST CANCER: ICD-10-CM

## 2025-04-18 PROCEDURE — 77067 SCR MAMMO BI INCL CAD: CPT | Performed by: RADIOLOGY

## 2025-04-18 PROCEDURE — 77063 BREAST TOMOSYNTHESIS BI: CPT | Performed by: RADIOLOGY

## 2025-04-28 ENCOUNTER — ANCILLARY PROCEDURE (OUTPATIENT)
Dept: MAMMOGRAPHY | Facility: CLINIC | Age: 60
End: 2025-04-28
Attending: NURSE PRACTITIONER
Payer: COMMERCIAL

## 2025-04-28 ENCOUNTER — MYC MEDICAL ADVICE (OUTPATIENT)
Dept: FAMILY MEDICINE | Facility: OTHER | Age: 60
End: 2025-04-28

## 2025-04-28 DIAGNOSIS — R92.8 ABNORMAL MAMMOGRAM: ICD-10-CM

## 2025-04-28 PROCEDURE — 77065 DX MAMMO INCL CAD UNI: CPT | Mod: RT | Performed by: STUDENT IN AN ORGANIZED HEALTH CARE EDUCATION/TRAINING PROGRAM

## 2025-05-06 ENCOUNTER — ANCILLARY PROCEDURE (OUTPATIENT)
Dept: MAMMOGRAPHY | Facility: CLINIC | Age: 60
End: 2025-05-06
Attending: NURSE PRACTITIONER
Payer: COMMERCIAL

## 2025-05-06 DIAGNOSIS — R92.8 ABNORMAL MAMMOGRAM: ICD-10-CM

## 2025-05-06 PROCEDURE — 88305 TISSUE EXAM BY PATHOLOGIST: CPT | Performed by: PATHOLOGY

## 2025-05-06 PROCEDURE — 88342 IMHCHEM/IMCYTCHM 1ST ANTB: CPT | Performed by: PATHOLOGY

## 2025-05-06 PROCEDURE — 19081 BX BREAST 1ST LESION STRTCTC: CPT | Mod: RT | Performed by: RADIOLOGY

## 2025-05-07 ENCOUNTER — RESULTS FOLLOW-UP (OUTPATIENT)
Dept: FAMILY MEDICINE | Facility: OTHER | Age: 60
End: 2025-05-07

## 2025-05-08 LAB
PATH REPORT.COMMENTS IMP SPEC: NORMAL
PATH REPORT.FINAL DX SPEC: NORMAL
PATH REPORT.GROSS SPEC: NORMAL
PATH REPORT.MICROSCOPIC SPEC OTHER STN: NORMAL
PATH REPORT.RELEVANT HX SPEC: NORMAL
PHOTO IMAGE: NORMAL

## 2025-05-09 ENCOUNTER — RESULTS FOLLOW-UP (OUTPATIENT)
Dept: FAMILY MEDICINE | Facility: OTHER | Age: 60
End: 2025-05-09

## 2025-05-09 NOTE — TELEPHONE ENCOUNTER
[facility] to discuss message from provider below:        They report they do not contact patient's regarding results, that is the ordering provider's responsibility.    Routing to PCP.    Noni Christian RN on 5/9/2025 at 9:58 AM

## 2025-05-10 ENCOUNTER — MYC MEDICAL ADVICE (OUTPATIENT)
Dept: FAMILY MEDICINE | Facility: OTHER | Age: 60
End: 2025-05-10
Payer: COMMERCIAL

## 2025-05-12 NOTE — TELEPHONE ENCOUNTER
Spoke with patient, she will schedule with surgery      CAMILO Murcia CNP  Questions or concerns please feel free to send me a [employer] message or call me  Phone : [phone]

## 2025-05-29 ENCOUNTER — OFFICE VISIT (OUTPATIENT)
Dept: SURGERY | Facility: CLINIC | Age: 60
End: 2025-05-29
Payer: COMMERCIAL

## 2025-05-29 ENCOUNTER — PATIENT OUTREACH (OUTPATIENT)
Dept: ONCOLOGY | Facility: CLINIC | Age: 60
End: 2025-05-29

## 2025-05-29 VITALS — HEIGHT: 66 IN | WEIGHT: 189.8 LBS | BODY MASS INDEX: 30.5 KG/M2

## 2025-05-29 DIAGNOSIS — Z12.39 BREAST CANCER SCREENING, HIGH RISK PATIENT: ICD-10-CM

## 2025-05-29 DIAGNOSIS — D05.01 NEOPLASM OF RIGHT BREAST, PRIMARY TUMOR STAGING CATEGORY TIS: LOBULAR CARCINOMA IN SITU (LCIS): Primary | ICD-10-CM

## 2025-05-29 NOTE — LETTER
5/29/2025      Deborah E Weil  [address] [address] [place] [address]      Dear Colleague,    Thank you for referring your patient, Deborah E Weil, to the [facility]. Please see a copy of my visit note below.    [facility] Breast Surgery Consultation    HPI:   Deborah E Weil is a 60 year old female who is seen in consultation at the request of FAVIOLA Barba for evaluation of right breast lobular carcinoma in situ, classic type as well as a sclerosed intraductal papilloma.     Evie had a screening mammogram on 4/18/2025 which revealed calcifications in the right breast at 10:00.  She returned for diagnostic imaging and this confirmed calcifications in the right breast spanning 2 cm and was felt BI-RADS Category 4 and suspicious.  She then underwent stereotactic biopsy which revealed classic LCIS and a sclerosed intraductal papilloma.    She reports no breast concerns prior to her screening mammogram.  She has not had prior breast biopsies.  She has had several callbacks on mammograms but no need for biopsies previously.  She has a history of melanoma in situ on her right upper chest that was excised by Dr. Miranda last year.     Hormonal history:   menarche 14,  2 children, 1st at age 23, post menopausal, 5 years OCP use, no HRT, no fertility treatment.     Family history of breast cancer: No  Family history of ovarian cancer:  No  Family history of colon cancer: No  Family history of prostate cancer: Yes - father      Past Medical History:   has a past medical history of Hypertension.      Current Outpatient Medications:      losartan-hydrochlorothiazide (HYZAAR) 100-12.5 MG tablet, Take 1 tablet by mouth daily., Disp: 90 tablet, Rfl: 3    Past Surgical History:  Past Surgical History:   Procedure Laterality Date     BIOPSY  April 2022    Mole removed from left breast     CHOLECYSTECTOMY, LAPOROSCOPIC  2004    Cholecystectomy, Laparoscopic     COLONOSCOPY  2019      COLONOSCOPY WITH CO2 INSUFFLATION N/A 01/17/2017    Procedure: COLONOSCOPY WITH CO2 INSUFFLATION;  Surgeon: Duane, William Charles, MD;  Location: [facility] OR           Allergies   Allergen Reactions     Lisinopril Cough         Social History:  Social History     Socioeconomic History     Marital status:      Spouse name: Not on file     Number of children: 2     Years of education: Not on file     Highest education level: Not on file   Occupational History     Employer: [employer]   Tobacco Use     Smoking status: Never     Smokeless tobacco: Never   Vaping Use     Vaping status: Never Used   Substance and Sexual Activity     Alcohol use: Yes     Comment: Occ at social events, 4 drinks a month     Drug use: No     Sexual activity: Yes     Partners: Male     Birth control/protection: Post-menopausal, None   Other Topics Concern     Parent/sibling w/ CABG, MI or angioplasty before 65F 55M? No   Social History Narrative     Not on file     Social Drivers of Health     Financial Resource Strain: Low Risk  (10/9/2024)    Financial Resource Strain      Within the past 12 months, have you or your family members you live with been unable to get utilities (heat, electricity) when it was really needed?: No   Food Insecurity: Low Risk  (10/9/2024)    Food Insecurity      Within the past 12 months, did you worry that your food would run out before you got money to buy more?: No      Within the past 12 months, did the food you bought just not last and you didn t have money to get more?: No   Transportation Needs: Low Risk  (10/9/2024)    Transportation Needs      Within the past 12 months, has lack of transportation kept you from medical appointments, getting your medicines, non-medical meetings or appointments, work, or from getting things that you need?: No   Physical Activity: Insufficiently Active (10/9/2024)    Exercise Vital Sign      Days of Exercise per Week: 2 days      Minutes of Exercise per Session: 30 min  "  Stress: No Stress Concern Present (10/9/2024)    [place] [employer] - Occupational Stress Questionnaire      Feeling of Stress : Not at all   Social Connections: Unknown (10/9/2024)    Social Connection and Isolation Panel [NHANES]      Frequency of Communication with Friends and Family: Not on file      Frequency of Social Gatherings with Friends and Family: More than three times a week      Attends Episcopal Services: Not on file      Active Member of Clubs or Organizations: Not on file      Attends Club or Organization Meetings: Not on file      Marital Status: Not on file   Interpersonal Safety: Low Risk  (10/11/2024)    Interpersonal Safety      Do you feel physically and emotionally safe where you currently live?: Yes      Within the past 12 months, have you been hit, slapped, kicked or otherwise physically hurt by someone?: No      Within the past 12 months, have you been humiliated or emotionally abused in other ways by your partner or ex-partner?: No   Housing Stability: Low Risk  (10/9/2024)    Housing Stability      Do you have housing? : Yes      Are you worried about losing your housing?: No        ROS:  The 10 point review of systems is negative other than noted in the HPI and above.    PE:  Vitals: Ht 1.676 m (5' 6\")   Wt 86.1 kg (189 lb 12.8 oz)   LMP 09/26/2014 (Exact Date)   BMI 30.63 kg/m    General appearance: well-nourished, sitting comfortably, no apparent distress  Psych: normal affect, pleasant  HEENT:  Head normocephalic and atraumatic, pupils equal and round, conjunctivae clear, mucous membranes moist, external ears and nose normal  Neck: Supple without thyromegaly or masses  Lungs: Respirations unlabored  Lymphatic: No cervical, or supraclavicular lymphadenopathy  Extremities: Without edema  Musculoskeletal:  Normal station and gait  Neurologic: nonfocal, grossly intact times four extremities, alert and oriented times three  Psychiatric: Mood and affect are " appropriate  Skin: Without lesions or rashes    Breast:  A bilateral breast exam was performed in the supine position.. Bilateral breasts were palpated in a circumferential clockwise fashion including the supraclavicular and axillary areas.   Breasts are large and symmetric.  Nipples are everted and appear normal bilaterally.  On the skin on the right lower breast at 6:00 there is a sebaceous cyst.  It is about 1.5 cm in size.  There are no other skin lesions.  Breasts are heterogeneously dense.  There are no masses palpable in either breast.      Lymph:       No supraclavicular/infraclavicular adenopathy.   Axillary adenopathy: none    Assessment/Plan: Deborah E Weil is a 60 year old who presents with right breast lobular carcinoma in situ, classic type as well as a sclerosed intraductal papilloma.  We reviewed her imaging and pathology results together.  I agree that her pathology and imaging are concordant.  We discussed LCIS at length.  We reviewed that this is a marker in her breast tissue that increases her lifetime risk of breast cancer going forward but is not cancer in and of itself.  We also discussed the benign intraductal papilloma.  We discussed options including high risk screening alternating mammogram and breast MRI every 6 months.  We did discuss the option of tag localized surgical excision however the most recent data would suggest the risk of upgrade in concordant imaging is extremely low.  She is most comfortable proceeding with high risk screening.  An order for an MRI was placed for October 2025 and I would like to see her after imaging.  Would then plan for mammogram in April 2026 as long as that is benign.  She would like to wait on meeting with medical oncology at this time as she is not sure she would like to take any hormone blocking medication.  She will let us know if she would like a referral.  She would like to have a referral placed to genetic counseling given her personal history of  melanoma and a order was placed for this.      45 minutes total time spent on the date of this encounter doing: chart review, review of test results, patient visit, physical exam, education, counseling, developing plan of care, and documenting.    Josie Weiss MD      Please route or send letter to:  Primary Care Provider (PCP) and Referring Provider                 Again, thank you for allowing me to participate in the care of your patient.        Sincerely,        Josie Weiss MD    Electronically signed

## 2025-05-29 NOTE — PROGRESS NOTES
[facility] Breast Surgery Consultation    HPI:   Deborah E Weil is a 60 year old female who is seen in consultation at the request of FAVIOLA Barba for evaluation of right breast lobular carcinoma in situ, classic type as well as a sclerosed intraductal papilloma.     Evie had a screening mammogram on 4/18/2025 which revealed calcifications in the right breast at 10:00.  She returned for diagnostic imaging and this confirmed calcifications in the right breast spanning 2 cm and was felt BI-RADS Category 4 and suspicious.  She then underwent stereotactic biopsy which revealed classic LCIS and a sclerosed intraductal papilloma.    She reports no breast concerns prior to her screening mammogram.  She has not had prior breast biopsies.  She has had several callbacks on mammograms but no need for biopsies previously.  She has a history of melanoma in situ on her right upper chest that was excised by Dr. Miranda last year.     Hormonal history:   menarche 14,  2 children, 1st at age 23, post menopausal, 5 years OCP use, no HRT, no fertility treatment.     Family history of breast cancer: No  Family history of ovarian cancer:  No  Family history of colon cancer: No  Family history of prostate cancer: Yes - father      Past Medical History:   has a past medical history of Hypertension.      Current Outpatient Medications:     losartan-hydrochlorothiazide (HYZAAR) 100-12.5 MG tablet, Take 1 tablet by mouth daily., Disp: 90 tablet, Rfl: 3    Past Surgical History:  Past Surgical History:   Procedure Laterality Date    BIOPSY  April 2022    Mole removed from left breast    CHOLECYSTECTOMY, LAPOROSCOPIC  2004    Cholecystectomy, Laparoscopic    COLONOSCOPY  2019    COLONOSCOPY WITH CO2 INSUFFLATION N/A 01/17/2017    Procedure: COLONOSCOPY WITH CO2 INSUFFLATION;  Surgeon: Duane, William Charles, MD;  Location:  OR           Allergies   Allergen Reactions    Lisinopril Cough         Social History:  Social  History     Socioeconomic History    Marital status:      Spouse name: Not on file    Number of children: 2    Years of education: Not on file    Highest education level: Not on file   Occupational History     Employer: [employer]   Tobacco Use    Smoking status: Never    Smokeless tobacco: Never   Vaping Use    Vaping status: Never Used   Substance and Sexual Activity    Alcohol use: Yes     Comment: Occ at social events, 4 drinks a month    Drug use: No    Sexual activity: Yes     Partners: Male     Birth control/protection: Post-menopausal, None   Other Topics Concern    Parent/sibling w/ CABG, MI or angioplasty before 65F 55M? No   Social History Narrative    Not on file     Social Drivers of Health     Financial Resource Strain: Low Risk  (10/9/2024)    Financial Resource Strain     Within the past 12 months, have you or your family members you live with been unable to get utilities (heat, electricity) when it was really needed?: No   Food Insecurity: Low Risk  (10/9/2024)    Food Insecurity     Within the past 12 months, did you worry that your food would run out before you got money to buy more?: No     Within the past 12 months, did the food you bought just not last and you didn t have money to get more?: No   Transportation Needs: Low Risk  (10/9/2024)    Transportation Needs     Within the past 12 months, has lack of transportation kept you from medical appointments, getting your medicines, non-medical meetings or appointments, work, or from getting things that you need?: No   Physical Activity: Insufficiently Active (10/9/2024)    Exercise Vital Sign     Days of Exercise per Week: 2 days     Minutes of Exercise per Session: 30 min   Stress: No Stress Concern Present (10/9/2024)    [place] [employer] [employer]ational Health - Occupational Stress Questionnaire     Feeling of Stress : Not at all   Social Connections: Unknown (10/9/2024)    Social Connection and Isolation Panel [NHANES]     Frequency  "of Communication with Friends and Family: Not on file     Frequency of Social Gatherings with Friends and Family: More than three times a week     Attends Congregation Services: Not on file     Active Member of Clubs or Organizations: Not on file     Attends Club or Organization Meetings: Not on file     Marital Status: Not on file   Interpersonal Safety: Low Risk  (10/11/2024)    Interpersonal Safety     Do you feel physically and emotionally safe where you currently live?: Yes     Within the past 12 months, have you been hit, slapped, kicked or otherwise physically hurt by someone?: No     Within the past 12 months, have you been humiliated or emotionally abused in other ways by your partner or ex-partner?: No   Housing Stability: Low Risk  (10/9/2024)    Housing Stability     Do you have housing? : Yes     Are you worried about losing your housing?: No        ROS:  The 10 point review of systems is negative other than noted in the HPI and above.    PE:  Vitals: Ht 1.676 m (5' 6\")   Wt 86.1 kg (189 lb 12.8 oz)   LMP 09/26/2014 (Exact Date)   BMI 30.63 kg/m    General appearance: well-nourished, sitting comfortably, no apparent distress  Psych: normal affect, pleasant  HEENT:  Head normocephalic and atraumatic, pupils equal and round, conjunctivae clear, mucous membranes moist, external ears and nose normal  Neck: Supple without thyromegaly or masses  Lungs: Respirations unlabored  Lymphatic: No cervical, or supraclavicular lymphadenopathy  Extremities: Without edema  Musculoskeletal:  Normal station and gait  Neurologic: nonfocal, grossly intact times four extremities, alert and oriented times three  Psychiatric: Mood and affect are appropriate  Skin: Without lesions or rashes    Breast:  A bilateral breast exam was performed in the supine position.. Bilateral breasts were palpated in a circumferential clockwise fashion including the supraclavicular and axillary areas.   Breasts are large and symmetric.  Nipples " are everted and appear normal bilaterally.  On the skin on the right lower breast at 6:00 there is a sebaceous cyst.  It is about 1.5 cm in size.  There are no other skin lesions.  Breasts are heterogeneously dense.  There are no masses palpable in either breast.      Lymph:       No supraclavicular/infraclavicular adenopathy.   Axillary adenopathy: none    Assessment/Plan: Deborah E Weil is a 60 year old who presents with right breast lobular carcinoma in situ, classic type as well as a sclerosed intraductal papilloma.  We reviewed her imaging and pathology results together.  I agree that her pathology and imaging are concordant.  We discussed LCIS at length.  We reviewed that this is a marker in her breast tissue that increases her lifetime risk of breast cancer going forward but is not cancer in and of itself.  We also discussed the benign intraductal papilloma.  We discussed options including high risk screening alternating mammogram and breast MRI every 6 months.  We did discuss the option of tag localized surgical excision however the most recent data would suggest the risk of upgrade in concordant imaging is extremely low.  She is most comfortable proceeding with high risk screening.  An order for an MRI was placed for October 2025 and I would like to see her after imaging.  Would then plan for mammogram in April 2026 as long as that is benign.  She would like to wait on meeting with medical oncology at this time as she is not sure she would like to take any hormone blocking medication.  She will let us know if she would like a referral.  She would like to have a referral placed to genetic counseling given her personal history of melanoma and a order was placed for this.      45 minutes total time spent on the date of this encounter doing: chart review, review of test results, patient visit, physical exam, education, counseling, developing plan of care, and documenting.    Josie Weiss MD      Please  route or send letter to:  Primary Care Provider (PCP) and Referring Provider

## 2025-05-29 NOTE — NURSING NOTE
"Deborah E Weil's goals for this visit include:   Chief Complaint   Patient presents with    Consult     Breast papilloma, LCIS       She requests these members of her care team be copied on today's visit information: no    PCP: Kathleen Oliver    Referring Provider:  CAMILO Castellon CNP  [address] [address]    Ht 1.676 m (5' 6\")   Wt 86.1 kg (189 lb 12.8 oz)   LMP 09/26/2014 (Exact Date)   BMI 30.63 kg/m      Do you need any medication refills at today's visit? No    Trish Motley LPN      "

## 2025-05-29 NOTE — PROGRESS NOTES
New Patient Oncology Nurse Navigator Note     Referring provider: Josie Weiss MD      Referring Clinic/Organization:     [facility]        Referred to (specialty:) Genetic Counseling and Cancer Risk Management     Requested provider (if applicable): NA     Date Referral Received: May 29, 2025     Evaluation for:  D05.01 (ICD-10-CM) - Neoplasm of right breast, primary tumor staging category Tis: lobular carcinoma in situ (LCIS)     Payor: [employer] [employer] / Plan: [employer] / Product Type: HMO /     May 29, 2025  Referral received and reviewed.   Sent to NPS to schedule.     Roxanne CHAVEZN, RN, OCN  Oncology Nurse Navigator   [facility]  Cancer Care Service Line   New Patient Hem/Onc Scheduling / Referrals: [phone] (fax: [phone] )

## 2025-06-02 ENCOUNTER — PATIENT OUTREACH (OUTPATIENT)
Dept: ONCOLOGY | Facility: CLINIC | Age: 60
End: 2025-06-02
Payer: COMMERCIAL

## 2025-06-02 NOTE — PROGRESS NOTES
New Patient Oncology Nurse Navigator Note     Referring provider: Dr. Josie Weiss     Referring Clinic/Organization: [facility]     Referred to (specialty:) Medical Oncology      Date Referral Received: June 2, 2025     Evaluation for:  D05.01 (ICD-10-CM) - Neoplasm of right breast, primary tumor staging category Tis: lobular carcinoma in situ (LCIS)      Clinical History (per Nurse review of records provided):      Deborah E. Weil is a 60 year old female who presented for screening mammograms on 4/18/25 revealing possible calcifications in the right breast at the 10 o'clock position, posterior depth. Diagnostic imaging followed on 4/28 and additional mammogram views confirm lateral coarse and punctate calcifications lateral quadrant right breast spanning about 2 cm that correlate with screening callback described on 4/18/2025.    5/6/25 Case: [id number]                                   RIGHT breast, calcifications, stereotactic core biopsy:  -Lobular carcinoma in situ (LCIS), classic type  -Sclerosed intraductal papilloma  -Columnar cell change  -Stromal calcifications in the intraductal papilloma  -Few luminal calcifications associated with LCIS and benign ducts   Comment   Most of the calcifications in this specimen are large stromal calcifications in the intraductal papilloma.    She met with Dr. Josie Weiss for surgical consult on 5/29. Patient is most comfortable proceeding with high risk screening. An order for an MRI was placed for October 2025 and Dr. Weiss would like to see her after imaging.  Would then plan for mammogram in April 2026 as long as that is benign.     Patient resides in [place], [place].    Do you have any previous cancer diagnoses? Melanoma right clavicle 2023.    Records Location: See Bookmarked material    Writer received referral, reviewed for appropriate plan, and referral transferred to New Patient Scheduling for completion.

## 2025-06-05 NOTE — TELEPHONE ENCOUNTER
RECORDS STATUS - BREAST    RECORDS REQUESTED FROM:    DIAGNOSIS:    NOTES DETAILS STATUS   OFFICE NOTE from referring provider Epic 5/29/25: Dr. Josie Weiss   OPERATIVE REPORT [facility] 5/6/25: MA Breast Bx   MEDICATION LIST [facility]    LABS     PATHOLOGY REPORTS  (Tissue diagnosis, Stage, ER/OK percentage positive and intensity of staining, HER2 IHC, FISH, and all biopsies from breast and any distant metastasis)                 Report in Epic 5/6/25: [id number]    IMAGING (NEED IMAGES & REPORT)     MAMMO PACS 5/6/25: MA Breast Bx  4/28/25-12/13/16   ULTRASOUND PACS 12/28/16: US Breast

## 2025-06-09 NOTE — PROGRESS NOTES
[facility] Hematology / Oncology    Name: Deborah E Weil  :  1965  MRN:  [id number]       History of Present Ilness   Patient is a 60 year old, White, female who right Lobular carcinoma in situ (LCIS), classic type and-sclerosed intraductal papilloma    Patient had bilateral digital screening mammogram done on 2025 which showed possible calcifications in the right breast at the 10 o'clock position.  She returned for diagnostic imaging and this confirmed calcifications in the right breast spanning 2 cm and was felt BI-RADS Category 4 and suspicious Patient underwent ultrasound-guided biopsy on 2025.  This biopsy showed lobular carcinoma in situ along with sclerosed intraductal papilloma    She reports no breast concerns prior to her screening mammogram.  She has not had prior breast biopsies.  She has had several callbacks on mammograms but no need for biopsies previously.  She has a history of melanoma in situ on her right upper chest that was excised by Dr. Miranda last year.     Females diagnosed with LCIS from the SEER database between  and  in whom the cumulative incidences of subsequent breast malignancy were 11.3% (95% CI, 10.7%-11.9%) and 19.8% (95% CI, 18.8%-20.9%) at 10 and 20 years, respectively    Individuals with history of LCIS are also at increased risk for invasive breast cancer in both the affected and contralateral breast.  The absolute annual risk of developing breast cancer in those with AH is 1%, which is significantly higher than the general population and this risk of breast cancer increases with the number of foci of AH    Plan and Assessment:    Patient is a 60 year old, White, female, who presents biopsy positive LCIS. Currently preferred approach for LCIS is a risk-reducing endocrine agent. Lobular Cacrinoma in Situ (LCIS) confers a risk of invasive breast cancer of about 2% a year, and  chemoprovention endocrine can cut the risk by 1/2, the breast cancer  become 1% or less. To reduce risk, antihormonal therapy is recommended using Tamoxifenm (category 1), or Raloxifenem (category 1), or Aromatase inhibitorsm (category 1) are recommended per the NCCN guidelines.    Lack risk reduction from removal of LCIS lesion: Individuals with history of LCIS are also at increased risk for invasive breast cancer in both the affected and contralateral breast.  The absolute annual risk of developing breast cancer in those with AH is 1%, which is significantly higher than the general population and this risk of breast cancer increases with the number of foci of AH.  So removing the focus of LCIS does not reduce or change the overall breast cancer risk.    Duration of therapy: The optimal duration of SERM therapy for breast cancer risk reduction is not known. The NSABP-P1 and STAR trials studied 5 years of riskreducing therapy with either tamoxifen or raloxifene.116,130 However, based on the updated STAR results, which showed that the benefits of raloxifene diminished after cessation of therapy,131 continuing raloxifene beyond 5 years might be an approach to maintain the risk-reducing activity of the agent    Follow up and Screening after diagnosis of LCIS: Patient should be seen in clinic every 6-12 months.  These patient should undergo Annual screening mammogramc with tomosynthesis. begin at diagnosis of ADH or lobular neoplasia (LCIS/ALH) but not prior to age 30 y   In addition physician and patient should consider annual breast MRI with and without contrast; or those who qualify for but cannot undergo MRI, contrast-enhanced mammography (MELECIO) or molecular breast imaging (MBI) should be considered; Alternatively  whole breast ultrasound may be done if MELECIO or MBI are not available     Patient is schedule to have bilateral MRI in several months    As such, we will proceed with Arimidex for at least 5 year . Because patient reporting having dense breast, she will undergo bilaterally  breast MRI in several months.  Patient will follow up in 1 month to assess her response or side effect profile from taking arimidex .        Review of Systems:  10 point ROS negative except for that above.    Past Medical / Surgical History:  Past Medical History:   Diagnosis Date    Hypertension      Past Surgical History:   Procedure Laterality Date    BIOPSY  April 2022    Mole removed from left breast    CHOLECYSTECTOMY, LAPOROSCOPIC  2004    Cholecystectomy, Laparoscopic    COLONOSCOPY  2019    COLONOSCOPY WITH CO2 INSUFFLATION N/A 01/17/2017    Procedure: COLONOSCOPY WITH CO2 INSUFFLATION;  Surgeon: Duane, William Charles, MD;  Location: [facility] OR     Patient Active Problem List   Diagnosis    CARDIOVASCULAR SCREENING; LDL GOAL LESS THAN 160    Hypertension goal BP (blood pressure) < 140/90    Menopause    GERD (gastroesophageal reflux disease)    Melanoma in situ of torso excluding breast (H)       Family History:  Family History   Problem Relation Age of Onset    Diabetes Father     C.A.D. Father         MI at 40    Hypertension Father     Diabetes Sister     Diabetes Sister     Colon Cancer No family hx of      Family history of breast cancer: No  Family history of ovarian cancer:  No  Family history of colon cancer: No  Family history of prostate cancer: Yes - father     Social History:  Social History     Tobacco Use    Smoking status: Never    Smokeless tobacco: Never   Vaping Use    Vaping status: Never Used   Substance Use Topics    Alcohol use: Yes     Comment: Occ at social events, 4 drinks a month    Drug use: No       Medications   Reviewed in EMR.    Allergies  Allergies   Allergen Reactions    Lisinopril Cough       Imaging  No images are attached to the encounter.     Laboratories    Physical Exam:  VS: LMP 09/26/2014 (Exact Date)   GEN: Well appearing.      I saw and examined the patient for 50 where greater than 50% was spent coming up with a treatment plan, conferring with patient and reviewing  history.                   --------------------

## 2025-06-18 ENCOUNTER — ONCOLOGY VISIT (OUTPATIENT)
Dept: ONCOLOGY | Facility: CLINIC | Age: 60
End: 2025-06-18
Attending: SURGERY
Payer: COMMERCIAL

## 2025-06-18 ENCOUNTER — PRE VISIT (OUTPATIENT)
Dept: ONCOLOGY | Facility: CLINIC | Age: 60
End: 2025-06-18
Payer: COMMERCIAL

## 2025-06-18 VITALS
HEIGHT: 66 IN | SYSTOLIC BLOOD PRESSURE: 144 MMHG | WEIGHT: 190 LBS | DIASTOLIC BLOOD PRESSURE: 95 MMHG | BODY MASS INDEX: 30.53 KG/M2 | RESPIRATION RATE: 16 BRPM | OXYGEN SATURATION: 98 % | HEART RATE: 73 BPM

## 2025-06-18 DIAGNOSIS — D05.01 NEOPLASM OF RIGHT BREAST, PRIMARY TUMOR STAGING CATEGORY TIS: LOBULAR CARCINOMA IN SITU (LCIS): ICD-10-CM

## 2025-06-18 PROCEDURE — 99213 OFFICE O/P EST LOW 20 MIN: CPT | Performed by: INTERNAL MEDICINE

## 2025-06-18 RX ORDER — ANASTROZOLE 1 MG/1
1 TABLET ORAL DAILY
Qty: 30 TABLET | Refills: 4 | Status: SHIPPED | OUTPATIENT
Start: 2025-06-18

## 2025-06-18 RX ORDER — MULTIVITAMIN WITH IRON
1 TABLET ORAL DAILY
COMMUNITY

## 2025-06-18 ASSESSMENT — PAIN SCALES - GENERAL: PAINLEVEL_OUTOF10: NO PAIN (0)

## 2025-06-18 NOTE — LETTER
2025      Deborah E Weil  [address] [address] [facility] [address]      Dear Colleague,    Thank you for referring your patient, Deborah E Weil, to the [facility] [place]. Please see a copy of my visit note below.    [facility] Hematology / Oncology    Name: Deborah E Weil  :  1965  MRN:  [id number]       History of Present Ilness   Patient is a 60 year old, White, female who right Lobular carcinoma in situ (LCIS), classic type and-sclerosed intraductal papilloma    Patient had bilateral digital screening mammogram done on 2025 which showed possible calcifications in the right breast at the 10 o'clock position.  She returned for diagnostic imaging and this confirmed calcifications in the right breast spanning 2 cm and was felt BI-RADS Category 4 and suspicious Patient underwent ultrasound-guided biopsy on 2025.  This biopsy showed lobular carcinoma in situ along with sclerosed intraductal papilloma    She reports no breast concerns prior to her screening mammogram.  She has not had prior breast biopsies.  She has had several callbacks on mammograms but no need for biopsies previously.  She has a history of melanoma in situ on her right upper chest that was excised by Dr. Miranda last year.     Females diagnosed with LCIS from the SEER database between 1983 and 2014 in whom the cumulative incidences of subsequent breast malignancy were 11.3% (95% CI, 10.7%-11.9%) and 19.8% (95% CI, 18.8%-20.9%) at 10 and 20 years, respectively    Individuals with history of LCIS are also at increased risk for invasive breast cancer in both the affected and contralateral breast.  The absolute annual risk of developing breast cancer in those with AH is 1%, which is significantly higher than the general population and this risk of breast cancer increases with the number of foci of AH    Plan and Assessment:    Patient is a 60 year old, White, female, who presents biopsy positive LCIS.  Currently preferred approach for LCIS is a risk-reducing endocrine agent. Lobular Cacrinoma in Situ (LCIS) confers a risk of invasive breast cancer of about 2% a year, and  chemoprovention endocrine can cut the risk by 1/2, the breast cancer become 1% or less. To reduce risk, antihormonal therapy is recommended using Tamoxifenm (category 1), or Raloxifenem (category 1), or Aromatase inhibitorsm (category 1) are recommended per the NCCN guidelines.    Lack risk reduction from removal of LCIS lesion: Individuals with history of LCIS are also at increased risk for invasive breast cancer in both the affected and contralateral breast.  The absolute annual risk of developing breast cancer in those with AH is 1%, which is significantly higher than the general population and this risk of breast cancer increases with the number of foci of AH.  So removing the focus of LCIS does not reduce or change the overall breast cancer risk.    Duration of therapy: The optimal duration of SERM therapy for breast cancer risk reduction is not known. The NSABP-P1 and STAR trials studied 5 years of riskreducing therapy with either tamoxifen or raloxifene.116,130 However, based on the updated STAR results, which showed that the benefits of raloxifene diminished after cessation of therapy,131 continuing raloxifene beyond 5 years might be an approach to maintain the risk-reducing activity of the agent    Follow up and Screening after diagnosis of LCIS: Patient should be seen in clinic every 6-12 months.  These patient should undergo Annual screening mammogramc with tomosynthesis. begin at diagnosis of ADH or lobular neoplasia (LCIS/ALH) but not prior to age 30 y   In addition physician and patient should consider annual breast MRI with and without contrast; or those who qualify for but cannot undergo MRI, contrast-enhanced mammography (MELECIO) or molecular breast imaging (MBI) should be considered; Alternatively  whole breast ultrasound may be  done if MELECIO or MBI are not available     Patient is schedule to have bilateral MRI in several months    As such, we will proceed with Arimidex for at least 5 year . Because patient reporting having dense breast, she will undergo bilaterally breast MRI in several months.  Patient will follow up in 1 month to assess her response or side effect profile from taking arimidex .        Review of Systems:  10 point ROS negative except for that above.    Past Medical / Surgical History:  Past Medical History:   Diagnosis Date     Hypertension      Past Surgical History:   Procedure Laterality Date     BIOPSY  April 2022    Mole removed from left breast     CHOLECYSTECTOMY, LAPOROSCOPIC  2004    Cholecystectomy, Laparoscopic     COLONOSCOPY  2019     COLONOSCOPY WITH CO2 INSUFFLATION N/A 01/17/2017    Procedure: COLONOSCOPY WITH CO2 INSUFFLATION;  Surgeon: Duane, William Charles, MD;  Location: [facility] OR     Patient Active Problem List   Diagnosis     CARDIOVASCULAR SCREENING; LDL GOAL LESS THAN 160     Hypertension goal BP (blood pressure) < 140/90     Menopause     GERD (gastroesophageal reflux disease)     Melanoma in situ of torso excluding breast (H)       Family History:  Family History   Problem Relation Age of Onset     Diabetes Father      C.A.D. Father         MI at 40     Hypertension Father      Diabetes Sister      Diabetes Sister      Colon Cancer No family hx of      Family history of breast cancer: No  Family history of ovarian cancer:  No  Family history of colon cancer: No  Family history of prostate cancer: Yes - father     Social History:  Social History     Tobacco Use     Smoking status: Never     Smokeless tobacco: Never   Vaping Use     Vaping status: Never Used   Substance Use Topics     Alcohol use: Yes     Comment: Occ at social events, 4 drinks a month     Drug use: No       Medications   Reviewed in EMR.    Allergies  Allergies   Allergen Reactions     Lisinopril Cough       Imaging  No images are  attached to the encounter.     Laboratories    Physical Exam:  VS: LMP 09/26/2014 (Exact Date)   GEN: Well appearing.      I saw and examined the patient for 50 where greater than 50% was spent coming up with a treatment plan, conferring with patient and reviewing history.                   --------------------      Again, thank you for allowing me to participate in the care of your patient.        Sincerely,        Tee Ramírez MD    Electronically signed

## 2025-06-18 NOTE — NURSING NOTE
"Oncology Rooming Note    June 18, 2025 11:32 AM   Deborah E Weil is a 60 year old female who presents for:    Chief Complaint   Patient presents with    Oncology Clinic Visit     New patient- breast cancer     Initial Vitals: BP (!) 144/95   Pulse 73   Resp 16   Ht 1.676 m (5' 5.98\")   Wt 86.2 kg (190 lb)   LMP 09/26/2014 (Exact Date)   SpO2 98%   BMI 30.68 kg/m   Estimated body mass index is 30.68 kg/m  as calculated from the following:    Height as of this encounter: 1.676 m (5' 5.98\").    Weight as of this encounter: 86.2 kg (190 lb). Body surface area is 2 meters squared.  No Pain (0) Comment: Data Unavailable   Patient's last menstrual period was 09/26/2014 (exact date).  Allergies reviewed: Yes  Medications reviewed: Yes    Medications: Medication refills not needed today.  Pharmacy name entered into MOG: [facility] [address] [place] [id number] [facility]    Frailty Screening:   Is the patient here for a new oncology consult visit in cancer care? 1. Yes. Over the past month, have you experienced difficulty or required a caregiver to assist with:   1. Balance, walking or general mobility (including any falls)? NO  2. Completion of self-care tasks such as bathing, dressing, toileting, grooming/hygiene?  NO  3. Concentration or memory that affects your daily life?  NO     PHQ9:  Did this patient require a PHQ9?: No      Clinical concerns: breast cancer       Andree Ratliff LPN              "

## 2025-07-01 ENCOUNTER — TELEPHONE (OUTPATIENT)
Dept: ONCOLOGY | Facility: CLINIC | Age: 60
End: 2025-07-01
Payer: COMMERCIAL

## 2025-07-01 NOTE — TELEPHONE ENCOUNTER
LVM with patient letting her know Dr Ramírez is no longer with  [facility]. I left our contact information for patient to call back to get re-scheduled.     Yolanda   [phone]

## 2025-07-25 NOTE — PROGRESS NOTES
[facility] Dermatology Note  Encounter Date: Jul 31, 2025  Office Visit     Reviewed patients past medical history and pertinent chart review prior to patients visit today.     Dermatology Problem List:  Last skin check: 07/31/25    History of MM:   -Melanoma in situ, lentiginous type, R upper chest, Mohs scheduled 8/16/23  2.  History of benign biopsies:   -right upper back, s/p shave Lichenoid keratosis 05/30/24   - mid abdomen- bx 5/31/23 intradermal nevus, repigmentation noted within biopsy site on 5/30/2024  - left breast s/p bx with PCP - SK  3. History of atypical nevi:   -left posterior upper arm, s/p shave junctional nevus with mild atypia 05/30/24   - left lower back, s/p shave lentiginous junctional melanocytic nevus with mild atypia 05/31/23    Family Hx: none   ____________________________________________    Assessment & Plan:       # Personal history of malignant melanoma  # Multiple nevi, trunk and extremities  # Solar lentigines  - No signs of recurrence. Continued observation recommended.   - Nevi demonstrate no concerning features on dermoscopy. We discussed the importance of self exams at home.   - ABCDEs: Counseled ABCDEs of melanoma: Asymmetry, Border (irregularity), Color (not uniform, changes in color), Diameter (greater than 6 mm which is about the size of a pencil eraser), and Evolving (any changes in preexisting moles).  - Sun protection: Counseled SPF 30+ sunscreen, UPF clothing, sun avoidance, tanning bed avoidance.    # Cherry angiomas  # Seborrheic keratoses  - We discussed the benign nature of the skin lesions. No treatment required. Continued observation recommended. Follow up with any concerns.      Follow-up: Annual for follow up full body skin exam, as needed for new or changing lesions or new concerns    All risks, benefits and alternatives were discussed with patient.  Patient is in agreement and understands the assessment and plan.  All questions were  "answered.  Sierra Nelson PA-C  [facility] Dermatology    ____________________________________________    CC: Skin Check (Pt states, \" Here for a fbse, has a history of MIS, no family history of skin cancer, no new areas of concern today\" )    HPI:  Ms. Deborah E Weil is a(n) 60 year old female who presents today as a return patient for a full body skin cancer screening. The patient has a history of malignant melanoma. Today, the patient reports no new specific cutaneous concerns. Patient is being diligent with photoprotection.     Physical Exam:  Vitals: LMP 09/26/2014 (Exact Date)   LYMPH: No cervical, axillary or inguinal lymphadenopathy.   SKIN: Total skin including the genitalia areas was performed. The exam included the scalp, face, neck, bilateral arms, chest, back, abdomen, bilateral legs, digits, mons pubis, buttocks, and nails.   - Otero II.  - The right upper chest demonstrates a well healed scar with no nodularity, repigmentation, or pain to palpation.   - Multiple tan/brown macules and papules scattered throughout exam, consistent with benign nevi. No concerning features on dermoscopy.   - Scattered tan, homogenous macules scattered on sun exposed skin, consistent with solar lentigines.   - Scattered waxy, stuck on appearing papules and patches, consistent with seborrheic keratoses.  - Several 1-2 mm red dome shaped symmetric papules, consistent with cherry angiomas.     - No other lesions of concern on areas examined.     SYSTEMS REVIEW  The patient denies unintended weight loss, fevers, chills, night sweats, headache, cough, bloody sputum, shortness of breath, abdominal pain, nausea, numbness/weakness, swollen glands, bone pain, or changing pigmented skin lesions.    Medications:  Current Outpatient Medications   Medication Sig Dispense Refill    anastrozole (ARIMIDEX) 1 MG tablet Take 1 tablet (1 mg) by mouth daily. 30 tablet 4    losartan-hydrochlorothiazide (HYZAAR) 100-12.5 MG " tablet Take 1 tablet by mouth daily. 90 tablet 3    magnesium 250 MG tablet Take 1 tablet by mouth daily.       No current facility-administered medications for this visit.      Past Medical History:   Patient Active Problem List   Diagnosis    CARDIOVASCULAR SCREENING; LDL GOAL LESS THAN 160    Hypertension goal BP (blood pressure) < 140/90    Menopause    GERD (gastroesophageal reflux disease)    Melanoma in situ of torso excluding breast (H)     Past Medical History:   Diagnosis Date    Hypertension        CC Sierra Nelson PA-C  [address] [place] [address] on close of this encounter.,

## 2025-07-25 NOTE — PATIENT INSTRUCTIONS
Patient Education       Proper skin care from [facility]:    -Eliminate harsh soaps as they strip the natural oils from the skin, often resulting in dry itchy skin ( i.e. Dial, Zest, [place] Spring)  -Use mild soaps such as Cetaphil or Dove Sensitive Skin in the shower. You do not need to use soap on arms, legs, and trunk every time you shower unless visibly soiled.   -Avoid hot or cold showers.  -After showering, lightly dry off and apply moisturizing within 2-3 minutes. This will help trap moisture in the skin.   -Aggressive use of a moisturizer at least 1-2 times a day to the entire body (including -Vanicream, Cetaphil, Aquaphor or Cerave) and moisturize hands after every washing.  -We recommend using moisturizers that come in a tub that needs to be scooped out, not a pump. This has more of an oil base. It will hold moisture in your skin much better than a water base moisturizer. The above recommended are non-pore clogging.      Wear a sunscreen with at least SPF 30 on your face, ears, neck and V of the chest daily. Wear sunscreen on other areas of the body if those areas are exposed to the sun throughout the day. Sunscreens can contain physical and/or chemical blockers. Physical blockers are less likely to clog pores, these include zinc oxide and titanium dioxide. Reapply every two hour and after swimming.     Sunscreen examples: https://www.ewg.org/sunscreen/    UV radiation  UVA radiation remains constant throughout the day and throughout the year. It is a longer wavelength than UVB and therefore penetrates deeper into the skin leading to immediate and delayed tanning, photoaging, and skin cancer. 70-80% of UVA and UVB radiation occurs between the hours of 10am-2pm.  UVB radiation  UVB radiation causes the most harmful effects and is more significant during the summer months. However, snow and ice can reflect UVB radiation leading to skin damage during the winter months as well. UVB radiation is  responsible for tanning, burning, inflammation, delayed erythema (pinkness), pigmentation (brown spots), and skin cancer.     I recommend self monthly full body exams and yearly full body exams with a dermatology provider. If you develop a new or changing lesion please follow up for examination. Most skin cancers are pink and scaly or pink and pearly. However, we do see blue/brown/black skin cancers.  Consider the ABCDEs of melanoma when giving yourself your monthly full body exam ( don't forget the groin, buttocks, feet, toes, etc). A-asymmetry, B-borders, C-color, D-diameter, E-elevation or evolving. If you see any of these changes please follow up in clinic. If you cannot see your back I recommend purchasing a hand held mirror to use with a larger wall mirror.       Checking for Skin Cancer  You can find cancer early by checking your skin each month. There are 3 kinds of skin cancer. They are melanoma, basal cell carcinoma, and squamous cell carcinoma. Doing monthly skin checks is the best way to find new marks or skin changes. Follow the instructions below for checking your skin.   The ABCDEs of checking moles for melanoma   Check your moles or growths for signs of melanoma using ABCDE:   Asymmetry: the sides of the mole or growth don t match  Border: the edges are ragged, notched, or blurred  Color: the color within the mole or growth varies  Diameter: the mole or growth is larger than 6 mm (size of a pencil eraser)  Evolving: the size, shape, or color of the mole or growth is changing (evolving is not shown in the images below)    Checking for other types of skin cancer  Basal cell carcinoma or squamous cell carcinoma have symptoms such as:     A spot or mole that looks different from all other marks on your skin  Changes in how an area feels, such as itching, tenderness, or pain  Changes in the skin's surface, such as oozing, bleeding, or scaliness  A sore that does not heal  New swelling or redness beyond  the border of a mole    Who s at risk?  Anyone can get skin cancer. But you are at greater risk if you have:   Fair skin, light-colored hair, or light-colored eyes  Many moles or abnormal moles on your skin  A history of sunburns from sunlight or tanning beds  A family history of skin cancer  A history of exposure to radiation or chemicals  A weakened immune system  If you have had skin cancer in the past, you are at risk for recurring skin cancer.   How to check your skin  Do your monthly skin checkups in front of a full-length mirror. Check all parts of your body, including your:   Head (ears, face, neck, and scalp)  Torso (front, back, and sides)  Arms (tops, undersides, upper, and lower armpits)  Hands (palms, backs, and fingers, including under the nails)  Buttocks and genitals  Legs (front, back, and sides)  Feet (tops, soles, toes, including under the nails, and between toes)  If you have a lot of moles, take digital photos of them each month. Make sure to take photos both up close and from a distance. These can help you see if any moles change over time.   Most skin changes are not cancer. But if you see any changes in your skin, call your doctor right away. Only he or she can diagnose a problem. If you have skin cancer, seeing your doctor can be the first step toward getting the treatment that could save your life.   [employer] last reviewed this educational content on 4/1/2019 2000-2020 [employer]. [address] [place] [address], [place] [address]. All rights reserved. This information is not intended as a substitute for professional medical care. Always follow your healthcare professional's instructions.       When should I call my doctor?  If you are worsening or not improving, please, contact us or seek urgent care as noted below.     Who should I call with questions (adults)?  [facility] (adult and pediatric): [phone]  [facility]  [place] (adult): [phone]  [facility] ([place], [place], [place] and [place]) [phone]  For urgent needs outside of business hours call the [facility] at [phone] and ask for the dermatology resident on call to be paged  If this is a medical emergency and you are unable to reach an ER, Call 911      If you need a prescription refill, please contact your pharmacy. Refills are approved or denied by our Physicians during normal business hours, Monday through Fridays  Per office policy, refills will not be granted if you have not been seen within the past year (or sooner depending on your child's condition The ABCDEs of Melanoma    Skin cancer can develop anywhere on the skin. Ask someone for help when checking your skin, especially in hard to see places. If you notice a mole different from others, or that changes, enlarges, itches, or bleeds (even if it is small), you should see a dermatologist.

## 2025-07-31 ENCOUNTER — OFFICE VISIT (OUTPATIENT)
Dept: DERMATOLOGY | Facility: CLINIC | Age: 60
End: 2025-07-31
Attending: STUDENT IN AN ORGANIZED HEALTH CARE EDUCATION/TRAINING PROGRAM
Payer: COMMERCIAL

## 2025-07-31 DIAGNOSIS — Z12.83 SKIN EXAM, SCREENING FOR CANCER: Primary | ICD-10-CM

## 2025-07-31 DIAGNOSIS — D18.01 CHERRY ANGIOMA: ICD-10-CM

## 2025-07-31 DIAGNOSIS — D22.9 MULTIPLE BENIGN NEVI: ICD-10-CM

## 2025-07-31 DIAGNOSIS — L82.1 SEBORRHEIC KERATOSIS: ICD-10-CM

## 2025-07-31 DIAGNOSIS — Z85.820 PERSONAL HISTORY OF MALIGNANT MELANOMA: ICD-10-CM

## 2025-07-31 ASSESSMENT — PAIN SCALES - GENERAL: PAINLEVEL_OUTOF10: NO PAIN (0)

## 2025-07-31 NOTE — LETTER
7/31/2025      Deborah E Weil  [id number] [address]      Dear Colleague,    Thank you for referring your patient, Deborah E Weil, to the [facility]. Please see a copy of my visit note below.    [facility] Note  Encounter Date: Jul 31, 2025  Office Visit     Reviewed patients past medical history and pertinent chart review prior to patients visit today.     Dermatology Problem List:  Last skin check: 07/31/25    History of MM:   -Melanoma in situ, lentiginous type, R upper chest, Mohs scheduled 8/16/23  2.  History of benign biopsies:   -right upper back, s/p shave Lichenoid keratosis 05/30/24   - mid abdomen- bx 5/31/23 intradermal nevus, repigmentation noted within biopsy site on 5/30/2024  - left breast s/p bx with PCP - [facility]  3. History of atypical nevi:   -left posterior upper arm, s/p shave junctional nevus with mild atypia 05/30/24   - left lower back, s/p shave lentiginous junctional melanocytic nevus with mild atypia 05/31/23    Family Hx: none   ____________________________________________    Assessment & Plan:       # Personal history of malignant melanoma  # Multiple nevi, trunk and extremities  # Solar lentigines  - No signs of recurrence. Continued observation recommended.   - Nevi demonstrate no concerning features on dermoscopy. We discussed the importance of self exams at home.   - ABCDEs: Counseled ABCDEs of melanoma: Asymmetry, Border (irregularity), Color (not uniform, changes in color), Diameter (greater than 6 mm which is about the size of a pencil eraser), and Evolving (any changes in preexisting moles).  - Sun protection: Counseled SPF 30+ sunscreen, UPF clothing, sun avoidance, tanning bed avoidance.    # Cherry angiomas  # Seborrheic keratoses  - We discussed the benign nature of the skin lesions. No treatment required. Continued observation recommended. Follow up with any concerns.      Follow-up: Annual for follow up  "full body skin exam, as needed for new or changing lesions or new concerns    All risks, benefits and alternatives were discussed with patient.  Patient is in agreement and understands the assessment and plan.  All questions were answered.  Sierra Nelson PA-C  [facility]    ____________________________________________    CC: Skin Check (Pt states, \" Here for a fbse, has a history of MIS, no family history of skin cancer, no new areas of concern today\" )    HPI:  Ms. Deborah E Weil is a(n) 60 year old female who presents today as a return patient for a full body skin cancer screening. The patient has a history of malignant melanoma. Today, the patient reports no new specific cutaneous concerns. Patient is being diligent with photoprotection.     Physical Exam:  Vitals: LMP 09/26/2014 (Exact Date)   LYMPH: No cervical, axillary or inguinal lymphadenopathy.   SKIN: Total skin including the genitalia areas was performed. The exam included the scalp, face, neck, bilateral arms, chest, back, abdomen, bilateral legs, digits, mons pubis, buttocks, and nails.   - Otero II.  - The right upper chest demonstrates a well healed scar with no nodularity, repigmentation, or pain to palpation.   - Multiple tan/brown macules and papules scattered throughout exam, consistent with benign nevi. No concerning features on dermoscopy.   - Scattered tan, homogenous macules scattered on sun exposed skin, consistent with solar lentigines.   - Scattered waxy, stuck on appearing papules and patches, consistent with seborrheic keratoses.  - Several 1-2 mm red dome shaped symmetric papules, consistent with cherry angiomas.     - No other lesions of concern on areas examined.     SYSTEMS REVIEW  The patient denies unintended weight loss, fevers, chills, night sweats, headache, cough, bloody sputum, shortness of breath, abdominal pain, nausea, numbness/weakness, swollen glands, bone pain, or changing pigmented skin " lesions.    Medications:  Current Outpatient Medications   Medication Sig Dispense Refill     anastrozole (ARIMIDEX) 1 MG tablet Take 1 tablet (1 mg) by mouth daily. 30 tablet 4     losartan-hydrochlorothiazide (HYZAAR) 100-12.5 MG tablet Take 1 tablet by mouth daily. 90 tablet 3     magnesium 250 MG tablet Take 1 tablet by mouth daily.       No current facility-administered medications for this visit.      Past Medical History:   Patient Active Problem List   Diagnosis     CARDIOVASCULAR SCREENING; LDL GOAL LESS THAN 160     Hypertension goal BP (blood pressure) < 140/90     Menopause     GERD (gastroesophageal reflux disease)     Melanoma in situ of torso excluding breast (H)     Past Medical History:   Diagnosis Date     Hypertension        CC Sierra Nelson PA-C  [address] [place] [address] on close of this encounter.,    Again, thank you for allowing me to participate in the care of your patient.        Sincerely,        Sierra Nelson PA-C    Electronically signed

## 2025-09-04 ENCOUNTER — ONCOLOGY VISIT (OUTPATIENT)
Dept: ONCOLOGY | Facility: CLINIC | Age: 60
End: 2025-09-04
Attending: PHYSICIAN ASSISTANT
Payer: COMMERCIAL

## 2025-09-04 VITALS
HEART RATE: 82 BPM | SYSTOLIC BLOOD PRESSURE: 126 MMHG | DIASTOLIC BLOOD PRESSURE: 87 MMHG | OXYGEN SATURATION: 98 % | WEIGHT: 193.6 LBS | HEIGHT: 66 IN | BODY MASS INDEX: 31.12 KG/M2

## 2025-09-04 DIAGNOSIS — D05.01 NEOPLASM OF RIGHT BREAST, PRIMARY TUMOR STAGING CATEGORY TIS: LOBULAR CARCINOMA IN SITU (LCIS): ICD-10-CM

## 2025-09-04 DIAGNOSIS — R06.02 SHORTNESS OF BREATH: ICD-10-CM

## 2025-09-04 DIAGNOSIS — Z78.0 POST-MENOPAUSAL: ICD-10-CM

## 2025-09-04 DIAGNOSIS — Z79.811 USE OF AROMATASE INHIBITORS: ICD-10-CM

## 2025-09-04 DIAGNOSIS — E83.52 SERUM CALCIUM ELEVATED: Primary | ICD-10-CM

## 2025-09-04 DIAGNOSIS — R10.11 ABDOMINAL PAIN, RIGHT UPPER QUADRANT: ICD-10-CM

## 2025-09-04 LAB
ALBUMIN SERPL BCG-MCNC: 4.6 G/DL (ref 3.5–5.2)
ALP SERPL-CCNC: 77 U/L (ref 40–150)
ALT SERPL W P-5'-P-CCNC: 24 U/L (ref 0–50)
ANION GAP SERPL CALCULATED.3IONS-SCNC: 12 MMOL/L (ref 7–15)
AST SERPL W P-5'-P-CCNC: 28 U/L (ref 0–45)
BASOPHILS # BLD AUTO: 0.1 10E3/UL (ref 0–0.2)
BASOPHILS NFR BLD AUTO: 1.1 %
BILIRUB SERPL-MCNC: 0.4 MG/DL
BUN SERPL-MCNC: 17.3 MG/DL (ref 8–23)
CALCIUM SERPL-MCNC: 10.5 MG/DL (ref 8.8–10.4)
CHLORIDE SERPL-SCNC: 102 MMOL/L (ref 98–107)
CREAT SERPL-MCNC: 0.9 MG/DL (ref 0.51–0.95)
EGFRCR SERPLBLD CKD-EPI 2021: 73 ML/MIN/1.73M2
EOSINOPHIL # BLD AUTO: 0.24 10E3/UL (ref 0–0.7)
EOSINOPHIL NFR BLD AUTO: 2.6 %
ERYTHROCYTE [DISTWIDTH] IN BLOOD BY AUTOMATED COUNT: 13.5 % (ref 10–15)
GLUCOSE SERPL-MCNC: 102 MG/DL (ref 70–99)
HCO3 SERPL-SCNC: 26 MMOL/L (ref 22–29)
HCT VFR BLD AUTO: 42.8 % (ref 35–47)
HGB BLD-MCNC: 14 G/DL (ref 11.7–15.7)
IMM GRANULOCYTES # BLD: 0.04 10E3/UL
IMM GRANULOCYTES NFR BLD: 0.4 %
LYMPHOCYTES # BLD AUTO: 2.71 10E3/UL (ref 0.8–5.3)
LYMPHOCYTES NFR BLD AUTO: 28.8 %
MCH RBC QN AUTO: 26.6 PG (ref 26.5–33)
MCHC RBC AUTO-ENTMCNC: 32.7 G/DL (ref 31.5–36.5)
MCV RBC AUTO: 81.2 FL (ref 78–100)
MONOCYTES # BLD AUTO: 0.9 10E3/UL (ref 0–1.3)
MONOCYTES NFR BLD AUTO: 9.6 %
NEUTROPHILS # BLD AUTO: 5.42 10E3/UL (ref 1.6–8.3)
NEUTROPHILS NFR BLD AUTO: 57.5 %
NRBC # BLD AUTO: <0.03 10E3/UL
NRBC BLD AUTO-RTO: 0 /100
PLATELET # BLD AUTO: 334 10E3/UL (ref 150–450)
POTASSIUM SERPL-SCNC: 4.8 MMOL/L (ref 3.4–5.3)
PROT SERPL-MCNC: 7.9 G/DL (ref 6.4–8.3)
RBC # BLD AUTO: 5.27 10E6/UL (ref 3.8–5.2)
SODIUM SERPL-SCNC: 140 MMOL/L (ref 135–145)
WBC # BLD AUTO: 9.41 10E3/UL (ref 4–11)

## 2025-09-04 PROCEDURE — 84075 ASSAY ALKALINE PHOSPHATASE: CPT | Performed by: PHYSICIAN ASSISTANT

## 2025-09-04 PROCEDURE — 99213 OFFICE O/P EST LOW 20 MIN: CPT | Performed by: PHYSICIAN ASSISTANT

## 2025-09-04 PROCEDURE — 85025 COMPLETE CBC W/AUTO DIFF WBC: CPT | Performed by: PHYSICIAN ASSISTANT

## 2025-09-04 ASSESSMENT — PAIN SCALES - GENERAL: PAINLEVEL_OUTOF10: NO PAIN (0)
